# Patient Record
Sex: FEMALE | Race: BLACK OR AFRICAN AMERICAN | URBAN - METROPOLITAN AREA
[De-identification: names, ages, dates, MRNs, and addresses within clinical notes are randomized per-mention and may not be internally consistent; named-entity substitution may affect disease eponyms.]

---

## 2017-01-06 ENCOUNTER — OFFICE VISIT (OUTPATIENT)
Dept: FAMILY MEDICINE | Facility: CLINIC | Age: 22
End: 2017-01-06

## 2017-01-06 VITALS
OXYGEN SATURATION: 100 % | DIASTOLIC BLOOD PRESSURE: 66 MMHG | RESPIRATION RATE: 20 BRPM | WEIGHT: 93.7 LBS | TEMPERATURE: 98.3 F | BODY MASS INDEX: 19.14 KG/M2 | SYSTOLIC BLOOD PRESSURE: 106 MMHG | HEART RATE: 80 BPM

## 2017-01-06 DIAGNOSIS — J30.81 ALLERGIC RHINITIS DUE TO ANIMAL HAIR AND DANDER, UNSPECIFIED RHINITIS SEASONALITY: ICD-10-CM

## 2017-01-06 DIAGNOSIS — F43.21 ADJUSTMENT DISORDER WITH DEPRESSED MOOD: Primary | ICD-10-CM

## 2017-01-06 DIAGNOSIS — R64 CACHEXIA (H): ICD-10-CM

## 2017-01-06 DIAGNOSIS — J30.1 SEASONAL ALLERGIC RHINITIS DUE TO POLLEN: ICD-10-CM

## 2017-01-06 DIAGNOSIS — E55.9 VITAMIN D DEFICIENCY: ICD-10-CM

## 2017-01-06 DIAGNOSIS — K59.01 SLOW TRANSIT CONSTIPATION: ICD-10-CM

## 2017-01-06 DIAGNOSIS — Z00.00 HEALTHCARE MAINTENANCE: ICD-10-CM

## 2017-01-06 RX ORDER — ERGOCALCIFEROL 1.25 MG/1
CAPSULE, LIQUID FILLED ORAL
Qty: 12 CAPSULE | Refills: 0 | Status: SHIPPED | OUTPATIENT
Start: 2017-01-06 | End: 2017-01-20

## 2017-01-06 RX ORDER — FLUTICASONE PROPIONATE 50 MCG
1-2 SPRAY, SUSPENSION (ML) NASAL DAILY
Qty: 16 G | Refills: 3 | Status: SHIPPED | OUTPATIENT
Start: 2017-01-06 | End: 2017-09-06

## 2017-01-06 RX ORDER — MIRTAZAPINE 15 MG/1
15 TABLET, FILM COATED ORAL AT BEDTIME
Qty: 31 TABLET | Refills: 3 | Status: SHIPPED | OUTPATIENT
Start: 2017-01-06 | End: 2017-03-13

## 2017-01-06 RX ORDER — POLYETHYLENE GLYCOL 3350 17 G/17G
1 POWDER, FOR SOLUTION ORAL DAILY
Qty: 510 G | Refills: 1 | Status: SHIPPED | OUTPATIENT
Start: 2017-01-06 | End: 2017-09-06

## 2017-01-06 NOTE — MR AVS SNAPSHOT
After Visit Summary   1/6/2017    Rupinder Spaulding    MRN: [id number]           Patient Information     Date Of Birth          1995        Visit Information        Provider Department      1/6/2017 8:40 AM aRjesh Sanchez DO [facility]        Today's Diagnoses     Adjustment disorder with depressed mood    -  1     Seasonal allergic rhinitis         Vitamin D deficiency         Slow transit constipation         Allergic rhinitis due to animal hair and dander, unspecified rhinitis seasonality         Cachexia (H)           Care Instructions    Here is the plan from today's visit    1. Adjustment disorder with depressed mood  - mirtazapine (REMERON) 15 MG tablet; Take 1 tablet (15 mg) by mouth At Bedtime  Dispense: 31 tablet; Refill: 3    2. Vitamin D deficiency  - vitamin D (ERGOCALCIFEROL) 89512 UNIT capsule; 1 capsule weekly for 12 weeks return for clinic appointment for recheck at 10-12 weeks  Dispense: 12 capsule; Refill: 0    3. Slow transit constipation  - polyethylene glycol (MIRALAX) powder; Take 17 g (1 capful) by mouth daily  Dispense: 510 g; Refill: 1    4. Allergic rhinitis due to animal hair and dander, unspecified rhinitis seasonality  - fluticasone (FLONASE) 50 MCG/ACT spray; Spray 1-2 sprays into both nostrils daily  Dispense: 16 g; Refill: 3    5. Cachexia (H)  [place] supplement packets may be cheaper.  - order for DME; Ensure or equivalent supplement 2 cans daily  Dispense: 60 Can; Refill: 3      Follow up plan: in 3 months with Dr. Mixon      Thank you for coming to [facility] today.  Lab Testing:  **If you had lab testing today and your results are reassuring or normal they will be mailed to you or sent through [employer] within 7 days.   **If the lab tests need quick action we will call you with the results.  The phone number we will call with results is # [phone] (home) . If this is not the best number please call our clinic and change the  number.  Medication Refills:  If you need any refills please call your pharmacy and they will contact us.   If you need to  your refill at a new pharmacy, please contact the new pharmacy directly. The new pharmacy will help you get your medications transferred faster.   Scheduling:  If you have any concerns about today's visit or wish to schedule another appointment please call our office during normal business hours [phone] (8-5:00 M-F)  If a referral was made to a [facility] and you don't get a call from central scheduling please call [phone].  If a Mammogram was ordered for you at [facility] call [phone] to schedule or change your appointment.  If you had an XRay/CT/Ultrasound/MRI ordered the number is [phone] to schedule or change your radiology appointment.   Medical Concerns:  If you have urgent medical concerns please call [phone] at any time of the day.  If you have a medical emergency please call [phone].          Follow-ups after your visit        Your next 10 appointments already scheduled     Jan 20, 2017  9:40 AM   Return Visit with Reuben Mixon MD   [facility] ([facility])    [address],  [address]  [place] [address]   [phone]              Who to contact     Please call your clinic at [phone] to:    Ask questions about your health    Make or cancel appointments    Discuss your medicines    Learn about your test results    Speak to your doctor   If you have compliments or concerns about an experience at your clinic, or if you wish to file a complaint, please contact [facility] Patient Relations at [phone] or email us at [email or web address]         Additional Information About Your Visit        [employer] Information     [employer] is an electronic gateway that provides easy, online access to your medical records. With [employer]izer, you can request a clinic  appointment, read your test results, renew a prescription or communicate with your care team.     To sign up for [employer] visit the website at [email or web address]   You will be asked to enter the access code listed below, as well as some personal information. Please follow the directions to create your username and password.     Your access code is: [email or web address]  Expires: 2017  9:29 AM     Your access code will  in 90 days. If you need help or a new code, please contact your [facility] or call [phone] for assistance.        Care EveryWhere ID     This is your Care EveryWhere ID. This could be used by other organizations to access your [place] medical records  [id number]        Your Vitals Were     Pulse Temperature Respirations Pulse Oximetry          80 98.3  F (36.8  C) (Oral) 20 100%         Blood Pressure from Last 3 Encounters:   17 106/66   16 101/67   16 96/69    Weight from Last 3 Encounters:   17 93 lb 11.2 oz (42.502 kg)   16 89 lb 9.6 oz (40.642 kg)   16 85 lb 9.6 oz (38.828 kg)              Today, you had the following     No orders found for display         Today's Medication Changes          These changes are accurate as of: 17  9:37 AM.  If you have any questions, ask your nurse or doctor.               Start taking these medicines.        Dose/Directions    order for DME   Used for:  Cachexia (H)   Started by:  Rajesh Sanchez, DO        Ensure or equivalent supplement 2 cans daily   Quantity:  60 Can   Refills:  3            Where to get your medicines      These medications were sent to [facility] [id number] [address]  [address] [place] [address]     Phone:  [phone]    - fluticasone 50 MCG/ACT spray  - mirtazapine 15 MG tablet  - polyethylene glycol powder  - vitamin D 71107 UNIT capsule      Some of these will need a  paper prescription and others can be bought over the counter.  Ask your nurse if you have questions.     Bring a paper prescription for each of these medications    - order for DME             Primary Care Provider Office Phone # Fax #    Reuben Mixon [phone]       [facility] [address] [place] [phone]        Thank you!     Thank you for choosing [facility]  for your care. Our goal is always to provide you with excellent care. Hearing back from our patients is one way we can continue to improve our services. Please take a few minutes to complete the written survey that you may receive in the mail after your visit with us. Thank you!             Your Updated Medication List - Protect others around you: Learn how to safely use, store and throw away your medicines at www.disposemymeds.org.          This list is accurate as of: 1/6/17  9:37 AM.  Always use your most recent med list.                   Brand Name Dispense Instructions for use    fluticasone 50 MCG/ACT spray    FLONASE    16 g    Spray 1-2 sprays into both nostrils daily       mirtazapine 15 MG tablet    REMERON    31 tablet    Take 1 tablet (15 mg) by mouth At Bedtime       NUTRITIONAL SUPPLEMENT Liqd     30 each    Take 1 Can by mouth 3 times daily       order for DME     60 Can    Ensure or equivalent supplement 2 cans daily       polyethylene glycol powder    MIRALAX    510 g    Take 17 g (1 capful) by mouth daily       vitamin D 89949 UNIT capsule    ERGOCALCIFEROL    12 capsule    1 capsule weekly for 12 weeks return for clinic appointment for recheck at 10-12 weeks

## 2017-01-06 NOTE — PATIENT INSTRUCTIONS
Here is the plan from today's visit    1. Adjustment disorder with depressed mood  - mirtazapine (REMERON) 15 MG tablet; Take 1 tablet (15 mg) by mouth At Bedtime  Dispense: 31 tablet; Refill: 3    2. Vitamin D deficiency  - vitamin D (ERGOCALCIFEROL) 92049 UNIT capsule; 1 capsule weekly for 12 weeks return for clinic appointment for recheck at 10-12 weeks  Dispense: 12 capsule; Refill: 0    3. Slow transit constipation  - polyethylene glycol (MIRALAX) powder; Take 17 g (1 capful) by mouth daily  Dispense: 510 g; Refill: 1    4. Allergic rhinitis due to animal hair and dander, unspecified rhinitis seasonality  - fluticasone (FLONASE) 50 MCG/ACT spray; Spray 1-2 sprays into both nostrils daily  Dispense: 16 g; Refill: 3    5. Cachexia (H)  [place] supplement packets may be cheaper.  - order for DME; Ensure or equivalent supplement 2 cans daily  Dispense: 60 Can; Refill: 3      Follow up plan: in 3 months with Dr. Mixon      Thank you for coming to [facility] today.  Lab Testing:  **If you had lab testing today and your results are reassuring or normal they will be mailed to you or sent through [employer] within 7 days.   **If the lab tests need quick action we will call you with the results.  The phone number we will call with results is # [phone] (home) . If this is not the best number please call our clinic and change the number.  Medication Refills:  If you need any refills please call your pharmacy and they will contact us.   If you need to  your refill at a new pharmacy, please contact the new pharmacy directly. The new pharmacy will help you get your medications transferred faster.   Scheduling:  If you have any concerns about today's visit or wish to schedule another appointment please call our office during normal business hours [phone] (8-5:00 M-F)  If a referral was made to a [facility] and you don't get a call from central scheduling please call  [phone].  If a Mammogram was ordered for you at [facility] call [phone] to schedule or change your appointment.  If you had an XRay/CT/Ultrasound/MRI ordered the number is [phone] to schedule or change your radiology appointment.   Medical Concerns:  If you have urgent medical concerns please call [phone] at any time of the day.  If you have a medical emergency please call [phone].

## 2017-01-06 NOTE — PROGRESS NOTES
HPI:       Rupinder Spaulding is a 22 year old who presents for the following  Patient presents with:  URI: coughing headach, unable to eat and sleep  Recheck Medication: all medications      93 lbs 11.2 oz   Wt Readings from Last 4 Encounters:   01/06/17 93 lb 11.2 oz (42.502 kg)   11/04/16 89 lb 9.6 oz (40.642 kg)   09/30/16 85 lb 9.6 oz (38.828 kg)   08/19/16 83 lb 9.6 oz (37.921 kg)       Mom has been giving patient nutritional drink (ensure) daily to help her gain weight.  Says it seems to have worked.  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  She has behavioral issues and is alone until her mom comes home from work.  Would like to discuss with a  to see if anything can be done to help.      A Setred  was used for  this visit.      Problem, Medication and Allergy Lists were reviewed and are current.  Patient is an established patient of this clinic.         Review of Systems:   Review of Systems   Constitutional: Negative for fever.   HENT: Positive for congestion.    Neurological: Positive for headaches.   Psychiatric/Behavioral: Positive for behavioral problems.             Physical Exam:   Patient Vitals for the past 24 hrs:   BP Temp Temp src Pulse Resp SpO2 Weight   01/06/17 0902 106/66 mmHg 98.3  F (36.8  C) Oral 80 20 100 % 93 lb 11.2 oz (42.502 kg)     Body mass index is 19.14 kg/(m^2).  Vitals were reviewed and were normal     Physical Exam   Constitutional: She appears well-developed. No distress.   Thin-appearing   Cardiovascular: Normal rate.    Pulmonary/Chest: Effort normal.   Psychiatric:   She is slowed and withdrawn. She is noncommunicative.   Responds to commands and smiles on occasion, unable to assess cognition or judgment. She does smile in response to some questions.            Results:     No labs ordered.   Assessment and Plan     1. Cachexia (H)  Mother has been buying ensure for  patient which has helped her gain weight.  Patient also taking mirtazapine.  Patient has gained 10lbs over the past 5 months.  Recommend that patient continues ensure two cans daily.  Can try carnation supplement packets in the meantime due to cost of ensure.  - order for DME; Ensure or equivalent supplement 2 cans daily  Dispense: 60 Can; Refill: 3    2. Adjustment disorder with depressed mood  Patient seems to be doing ok.  Mom feels she is still depressed and hopes having more PCA hours will help.  Rocío came in to discuss with her.  - mirtazapine (REMERON) 15 MG tablet; Take 1 tablet (15 mg) by mouth At Bedtime  Dispense: 31 tablet; Refill: 3    3. Vitamin D deficiency  - vitamin D (ERGOCALCIFEROL) 77706 UNIT capsule; 1 capsule weekly for 12 weeks return for clinic appointment for recheck at 10-12 weeks  Dispense: 12 capsule; Refill: 0    4. Slow transit constipation  - polyethylene glycol (MIRALAX) powder; Take 17 g (1 capful) by mouth daily  Dispense: 510 g; Refill: 1    5. Allergic rhinitis due to animal hair and dander, unspecified rhinitis seasonality  - fluticasone (FLONASE) 50 MCG/ACT spray; Spray 1-2 sprays into both nostrils daily  Dispense: 16 g; Refill: 3      Follow up plan: in 3 months with Dr. Mixon    There are no discontinued medications.  Options for treatment and follow-up care were reviewed with the patient. Rupinder Spaulding  engaged in the decision making process and verbalized understanding of the options discussed and agreed with the final plan.    Rajesh Sanchez DO

## 2017-01-06 NOTE — Clinical Note
[facility]   [address],  [address]  [facility] [address]  [phone]        2017    Rupinder Spaulding  [address]  [facility] [address]  [phone] (home)     :     1995          To Whom it May Concern:    This patient missed school 2017 due to a clinic visit.    Please contact me for questions or concerns at [phone].    Sincerely,            Lottie Castrejon CMA

## 2017-01-06 NOTE — PROGRESS NOTES
Preceptor Attestation:   Patient seen and discussed with the resident. Assessment and plan reviewed with resident and agreed upon.   Supervising Physician:  Reuben Mixon MD  [facility]

## 2017-01-07 ASSESSMENT — ENCOUNTER SYMPTOMS
FEVER: 0
HEADACHES: 1

## 2017-01-12 ENCOUNTER — TELEPHONE (OUTPATIENT)
Dept: FAMILY MEDICINE | Facility: CLINIC | Age: 22
End: 2017-01-12

## 2017-01-12 NOTE — TELEPHONE ENCOUNTER
Message routed to PCP to advise calorie content and flavor. Please route to PCS follow up with order. RN unable to provide patient's insurance information without consent.    Iliana Singleton RN

## 2017-01-12 NOTE — TELEPHONE ENCOUNTER
[facility] phone call message- general phone call:    Reason for call: [employer] is calling. [employer] faxed over a form for Ensure liquid. They state they are missing some information. They need the flavor, the number of calories. They also need insurance information. It was sent to them on 1/9/17    Return call needed: Yes    OK to leave a message on voice mail? No    Primary language: Afghan      needed? Yes    Call taken on January 12, 2017 at 4:05 PM by Brittani Rowe

## 2017-01-13 NOTE — TELEPHONE ENCOUNTER
200-300 Kcal in each supplement three times daily, flavor vanilla or any other flavor patient desires, insurance info from the patient.   Please call [employer]

## 2017-01-13 NOTE — TELEPHONE ENCOUNTER
Unable to reach caller, left VM advising information from PCP. Advised they will need to contact patient to gather insurance information.    Iliana Singleton RN

## 2017-01-20 ENCOUNTER — OFFICE VISIT (OUTPATIENT)
Dept: FAMILY MEDICINE | Facility: CLINIC | Age: 22
End: 2017-01-20

## 2017-01-20 VITALS
TEMPERATURE: 98.3 F | BODY MASS INDEX: 18.92 KG/M2 | RESPIRATION RATE: 18 BRPM | SYSTOLIC BLOOD PRESSURE: 91 MMHG | HEART RATE: 68 BPM | OXYGEN SATURATION: 99 % | DIASTOLIC BLOOD PRESSURE: 60 MMHG | WEIGHT: 92.6 LBS

## 2017-01-20 DIAGNOSIS — G44.219 EPISODIC TENSION-TYPE HEADACHE, NOT INTRACTABLE: ICD-10-CM

## 2017-01-20 DIAGNOSIS — E55.9 VITAMIN D DEFICIENCY: Primary | ICD-10-CM

## 2017-01-20 DIAGNOSIS — E44.0 MODERATE MALNUTRITION (H): ICD-10-CM

## 2017-01-20 RX ORDER — ERGOCALCIFEROL 1.25 MG/1
CAPSULE, LIQUID FILLED ORAL
Qty: 12 CAPSULE | Refills: 0 | Status: SHIPPED | OUTPATIENT
Start: 2017-01-20 | End: 2017-09-06

## 2017-01-20 RX ORDER — ZINC OXIDE 216 MG/ML
1 LOTION TOPICAL 3 TIMES DAILY
Qty: 30 EACH | Refills: 11 | Status: SHIPPED | OUTPATIENT
Start: 2017-01-20 | End: 2017-09-06

## 2017-01-20 ASSESSMENT — ENCOUNTER SYMPTOMS
SHORTNESS OF BREATH: 0
SORE THROAT: 0
FEVER: 0
NECK STIFFNESS: 0
FACIAL ASYMMETRY: 0
FATIGUE: 0
EYE REDNESS: 0
DYSPHORIC MOOD: 0
TROUBLE SWALLOWING: 0
PHOTOPHOBIA: 0
COUGH: 0
RHINORRHEA: 0
WHEEZING: 0
ACTIVITY CHANGE: 0
CHEST TIGHTNESS: 0
ADENOPATHY: 0

## 2017-01-20 NOTE — PATIENT INSTRUCTIONS
Here is the plan from today's visit    1. Vitamin D deficiency  - vitamin D (ERGOCALCIFEROL) 07837 UNIT capsule; 1 capsule weekly for 12 weeks return for clinic appointment for recheck at 10-12 weeks  Dispense: 12 capsule; Refill: 0    2. Episodic tension-type headache, not intractable  Ear plugs/foam for ears to drown out noise.  Also use naproxen as needed.    - naproxen (NAPROSYN) 375 MG tablet; Take 1 tablet (375 mg) by mouth daily as needed for moderate pain  Dispense: 60 tablet; Refill: 1  - Misc. Devices (FOAM EAR PLUGS) MISC; 1 each daily as needed  Dispense: 6 each; Refill: 1    3. Moderate malnutrition (H)    - NUTRITIONAL SUPPLEMENT LIQD; Take 1 Can by mouth 3 times daily  Dispense: 30 each; Refill: 11          Please call or return to clinic if your symptoms don't go away.    Follow up plan  Please make a clinic appointment for follow up with your primary physician Reuben Mixon if symptoms worsen or do not improve.    Thank you for coming to [facility] today.  Lab Testing:  **If you had lab testing today and your results are reassuring or normal they will be mailed to you or sent through [employer] within 7 days.   **If the lab tests need quick action we will call you with the results.  The phone number we will call with results is # [phone] (home) . If this is not the best number please call our clinic and change the number.  Medication Refills:  If you need any refills please call your pharmacy and they will contact us.   If you need to  your refill at a new pharmacy, please contact the new pharmacy directly. The new pharmacy will help you get your medications transferred faster.   Scheduling:  If you have any concerns about today's visit or wish to schedule another appointment please call our office during normal business hours [phone] (8-5:00 M-F)  If a referral was made to a [facility] and you don't get a call from central scheduling please call  [phone].  If a Mammogram was ordered for you at [facility] call [phone] to schedule or change your appointment.  If you had an XRay/CT/Ultrasound/MRI ordered the number is [phone] to schedule or change your radiology appointment.   Medical Concerns:  If you have urgent medical concerns please call [phone] at any time of the day.  If you have a medical emergency please call [phone].

## 2017-01-20 NOTE — PROGRESS NOTES
HPI:       Rupinder Spaulding is a 22 year old who presents for the following  Patient presents with:  Cough  Refill Request      Acute Illness   Concerns: cough   When did it start? 15 days   Is it getting better, worse or staying the same? better    Fever?: No     Chills/Sweats?: No     Headache (location? Yes     Sinus Pressure?:No     Conjunctivitis?: No     Ear Pain?: No     Runny nose?: yes taking nasal spray     Congestion?:  YES taking nasal spray    Sore Throat?: No      Cough?:  YES-non-productive    Wheeze?: No     Decreased Appetite?: No     Nausea?:No     Vomiting?: No     Diarrhea?:  No     Dysuria/Frequency?.:No     Fatigue/Achiness?: YES after school patient is tired     Sick/Strep Exposure?: No      Therapies Tried and outcome: mediations/ nasal spray     Headaches almost everyday she comes back from school has headaches -she goes to her room      Patient presents with cough and headaches.  Stated that she has had a cough for about 2 weeks.  Stated that the cough has gotten better, but has not gone away.  Stated that she is complaining of a headache mostly all the time, worse after school.  Also having a lot of fatigue. Just wants to rest after school.  Discussed that headache is not concerning.  Stress headache most likely.      Headache     Onset: several weeks     Description:   Location: bilateral in the frontal area   Character: throbbing pain  Frequency:  Almost daily  Duration:  An hour after school    Intensity: mild    Progression of Symptoms:  intermittent    Accompanying Signs & Symptoms:  Stiff neck: no  Neck or upper back pain: no  Fever: no  Sinus pressure: no  Nausea or vomitingno  Dizziness: { no yes  :no  Numbness::no  Weakness (in one part of the body?  :no  Visual changes: no   History:   Head trauma: no  Family history of migraines: no  Previous tests for headaches: no  Neurologist evaluations: no  Able to do daily activities: Yes  Wake with a headaches: no  Do headaches wake you  up:no  Daily pain medication use:  no  New life stressors  :unclear    Precipitating factors:   Does light make it worse: no  Does sound make it worse: no    Alleviating factors:  Does sleep help: YES         Therapies Tried and outcome: none    A Russian  was used for  this visit.      Problem, Medication and Allergy Lists were reviewed and are current.  Patient is an established patient of this clinic.         Review of Systems:   Review of Systems   Constitutional: Negative for fever, activity change and fatigue.   HENT: Negative for ear pain, rhinorrhea, sneezing, sore throat and trouble swallowing.    Eyes: Negative for photophobia and redness.   Respiratory: Negative for cough, chest tightness, shortness of breath and wheezing.    Musculoskeletal: Negative for neck stiffness.   Allergic/Immunologic: Negative for environmental allergies.   Neurological: Negative for facial asymmetry.        See HPI   Hematological: Negative for adenopathy.   Psychiatric/Behavioral: Negative for dysphoric mood.             Physical Exam:     Patient Vitals for the past 24 hrs:   BP Temp Temp src Pulse Resp SpO2 Weight   01/20/17 0952 91/60 mmHg 98.3  F (36.8  C) Oral 68 18 99 % 92 lb 9.6 oz (42.003 kg)     Body mass index is 18.92 kg/(m^2).  Vitals were reviewed and were normal    Wt Readings from Last 4 Encounters:   01/20/17 92 lb 9.6 oz (42.003 kg)   01/06/17 93 lb 11.2 oz (42.502 kg)   11/04/16 89 lb 9.6 oz (40.642 kg)   09/30/16 85 lb 9.6 oz (38.828 kg)        Physical Exam   Constitutional: She is oriented to person, place, and time. She appears well-developed and well-nourished. No distress.   HENT:   Head: Normocephalic and atraumatic.       Right Ear: Hearing, tympanic membrane, external ear and ear canal normal.   Left Ear: Hearing, tympanic membrane, external ear and ear canal normal.   Nose: Nose normal.   Mouth/Throat: Uvula is midline, oropharynx is clear and moist and mucous membranes are normal.    Headache in forehead, stress headache    Eyes: Conjunctivae and EOM are normal. Pupils are equal, round, and reactive to light.   Neck: Normal range of motion. Neck supple. No thyromegaly present.   Cardiovascular: Normal rate, regular rhythm, normal heart sounds and intact distal pulses.    Pulmonary/Chest: Effort normal and breath sounds normal.   Musculoskeletal: Normal range of motion.   Neurological: She is alert and oriented to person, place, and time. She has normal strength and normal reflexes. She displays no atrophy and no tremor. No cranial nerve deficit or sensory deficit. She exhibits normal muscle tone. She displays a negative Romberg sign. She displays no seizure activity. Coordination and gait normal. GCS eye subscore is 4. GCS verbal subscore is 5. GCS motor subscore is 6.   Reflex Scores:       Patellar reflexes are 2+ on the right side and 2+ on the left side.       Achilles reflexes are 2+ on the right side and 2+ on the left side.  Psychiatric: She has a normal mood and affect. Her speech is normal and behavior is normal. Judgment and thought content normal.   Vitals reviewed.        Results:     none  Assessment and Plan     Patient Instructions   Here is the plan from today's visit    1. Vitamin D deficiency  - vitamin D (ERGOCALCIFEROL) 10652 UNIT capsule; 1 capsule weekly for 12 weeks return for clinic appointment for recheck at 10-12 weeks  Dispense: 12 capsule; Refill: 0    2. Episodic tension-type headache, not intractable  Ear plugs/foam for ears to protect from noise.  Also use naproxen as needed.    - naproxen (NAPROSYN) 375 MG tablet; Take 1 tablet (375 mg) by mouth daily as needed for moderate pain  Dispense: 60 tablet; Refill: 1  - Misc. Devices (FOAM EAR PLUGS) MISC; 1 each daily as needed  Dispense: 6 each; Refill: 1    3. Moderate malnutrition (H)    - NUTRITIONAL SUPPLEMENT LIQD; Take 1 Can by mouth 3 times daily  Dispense: 30 each; Refill: 11      Please call or return to  clinic if your symptoms don't go away.    Follow up plan  Please make a clinic appointment for follow up with your primary physician Reuben Mixon if symptoms worsen or do not improve.      There are no discontinued medications.  Options for treatment and follow-up care were reviewed with the patient. Rupinder Spaulding  engaged in the decision making process and verbalized understanding of the options discussed and agreed with the final plan.    Reuben Mixon MD

## 2017-01-20 NOTE — PROGRESS NOTES
"      HPI:       Rupinder Spaulding is a 22 year old who presents for the following  No chief complaint on file.      Acute Illness   Concerns: ***  When did it start? ***  Is it getting better, worse or staying the same? {SAME/BETTER/WORSE:983487}    Fever?: { :222786::\"No \"}    Chills/Sweats?: { :549348::\"No \"}    Headache (location?){ :143329::\"No \"}    Sinus Pressure?:{ :932499::\"No \"}    Conjunctivitis?: { :805074::\"No \"}    Ear Pain?: { :231567::\"No \"}    Runny nose?: { :422129::\"No \"}    Congestion?: { :397725::\"No \"}    Sore Throat?: { :972696::\"No \"}     Cough?: {No Yes Cough Red:236576::\"no \"}    Wheeze?: { :804771::\"No \"}    Decreased Appetite?: { :782208::\"No \"}    Nausea?:{ :837163::\"No \"}    Vomiting?: { :763048::\"No \"}    Diarrhea?:  { :823468::\"No \"}    Dysuria/Frequency?.:{ :158841::\"No \"}    Fatigue/Achiness?:{ :468293::\"No \"}    Sick/Strep Exposure?: { :976956::\"No \"}     Therapies Tried and outcome: { :14810::\"Nothing\"}  ,       Adherence and Exercise  Medication side effects: {YES/NO/NOT ASKED/NOT IMTIAZ/Med side effects:160322}  How often is a medication missed? {FREQUENCY PER WEEK:759783}  Are you able to follow the treatment plan? {YES / NO:938079::\"Yes\"}  Exercise:{[facility]  Exercise Type:817488} {Exercise frequency days per week:103934}         A flo.do  was used for  this visit.          Adherence and Exercise  Medication side effects: {YES/NO/NOT ASKED/NOT IMTIAZ/Med side effects:316183}  How often is a medication missed? {FREQUENCY PER WEEK:768411}  Are you able to follow the treatment plan? {YES / NO:056211::\"Yes\"}  Exercise:{[facility] Exercise Type:059203} {Exercise frequency days per week:923475}       Problem, Medication and Allergy Lists were reviewed and are current.  Patient is an established patient of this clinic.         Review of Systems:   Review of Systems          Physical Exam:   No data found.    There is no weight on file to calculate BMI.  {[facility] VITALS OPTIONS:957151::\"Vitals " "were reviewed and were normal\"}     Physical Exam  {  Detailed Ortho and mental status exam:955619}    Results:     {Result Choices:192682}  Assessment and Plan     {Assessment/Plan Pick List :042310}  There are no discontinued medications.  Options for treatment and follow-up care were reviewed with the patient. Rupinder Spaulding  engaged in the decision making process and verbalized understanding of the options discussed and agreed with the final plan.    Reuben Mixon MD      "

## 2017-01-20 NOTE — MR AVS SNAPSHOT
After Visit Summary   1/20/2017    Rupinder Spaulding    MRN: [id number]           Patient Information     Date Of Birth          1995        Visit Information        Provider Department      1/20/2017 9:40 AM Reuben Mixon MD [facility]        Today's Diagnoses     Vitamin D deficiency    -  1     Episodic tension-type headache, not intractable         Moderate malnutrition (H)           Care Instructions    Here is the plan from today's visit    1. Vitamin D deficiency  - vitamin D (ERGOCALCIFEROL) 33686 UNIT capsule; 1 capsule weekly for 12 weeks return for clinic appointment for recheck at 10-12 weeks  Dispense: 12 capsule; Refill: 0    2. Episodic tension-type headache, not intractable  Ear plugs/foam for ears to drown out noise.  Also use naproxen as needed.    - naproxen (NAPROSYN) 375 MG tablet; Take 1 tablet (375 mg) by mouth daily as needed for moderate pain  Dispense: 60 tablet; Refill: 1  - Misc. Devices (FOAM EAR PLUGS) MISC; 1 each daily as needed  Dispense: 6 each; Refill: 1    3. Moderate malnutrition (H)    - NUTRITIONAL SUPPLEMENT LIQD; Take 1 Can by mouth 3 times daily  Dispense: 30 each; Refill: 11          Please call or return to clinic if your symptoms don't go away.    Follow up plan  Please make a clinic appointment for follow up with your primary physician Reuben Mixon if symptoms worsen or do not improve.    Thank you for coming to [facility] today.  Lab Testing:  **If you had lab testing today and your results are reassuring or normal they will be mailed to you or sent through [place] within 7 days.   **If the lab tests need quick action we will call you with the results.  The phone number we will call with results is # [phone] (home) . If this is not the best number please call our clinic and change the number.  Medication Refills:  If you need any refills please call your pharmacy and they will contact us.   If you need to  your refill  at a new pharmacy, please contact the new pharmacy directly. The new pharmacy will help you get your medications transferred faster.   Scheduling:  If you have any concerns about today's visit or wish to schedule another appointment please call our office during normal business hours [phone] (8-5:00 M-F)  If a referral was made to a [facility] and you don't get a call from central scheduling please call [phone].  If a Mammogram was ordered for you at [facility] call [phone] to schedule or change your appointment.  If you had an XRay/CT/Ultrasound/MRI ordered the number is [phone] to schedule or change your radiology appointment.   Medical Concerns:  If you have urgent medical concerns please call [phone] at any time of the day.  If you have a medical emergency please call 458.        Follow-ups after your visit        Who to contact     Please call your clinic at [phone] to:    Ask questions about your health    Make or cancel appointments    Discuss your medicines    Learn about your test results    Speak to your doctor   If you have compliments or concerns about an experience at your clinic, or if you wish to file a complaint, please contact [facility] Patient Relations at [phone] or email us at [email or web address]         Additional Information About Your Visit        MyChart Information     "[employer] is an electronic gateway that provides easy, online access to your medical records. With "[employer] you can request a clinic appointment, read your test results, renew a prescription or communicate with your care team.     To sign up for [employer] visit the website at [email or web address]   You will be asked to enter the access code listed below, as well as some personal information. Please follow the directions to create your username and password.     Your access code is: [email or web address]  Expires: 4/6/2017  9:29  AM     Your access code will  in 90 days. If you need help or a new code, please contact your [facility] or call [phone] for assistance.        Care EveryWhere ID     This is your Care EveryWhere ID. This could be used by other organizations to access your [facility] medical records  [id number]        Your Vitals Were     Pulse Temperature Respirations Pulse Oximetry          68 98.3  F (36.8  C) (Oral) 18 99%         Blood Pressure from Last 3 Encounters:   17 91/60   17 106/66   16 101/67    Weight from Last 3 Encounters:   17 92 lb 9.6 oz (42.003 kg)   17 93 lb 11.2 oz (42.502 kg)   16 89 lb 9.6 oz (40.642 kg)              Today, you had the following     No orders found for display         Today's Medication Changes          These changes are accurate as of: 17 10:36 AM.  If you have any questions, ask your nurse or doctor.               Start taking these medicines.        Dose/Directions    Foam Ear Plugs Misc   Used for:  Episodic tension-type headache, not intractable   Started by:  Reuben Mixon MD        Dose:  1 each   1 each daily as needed   Quantity:  6 each   Refills:  1       naproxen 375 MG tablet   Commonly known as:  NAPROSYN   Used for:  Episodic tension-type headache, not intractable   Started by:  Reuben Mixon MD        Dose:  375 mg   Take 1 tablet (375 mg) by mouth daily as needed for moderate pain   Quantity:  60 tablet   Refills:  1            Where to get your medicines      These medications were sent to [facility] [id number] [address] AT SEC OF [facility] [employer]  [address] [place] [address]     Phone:  [phone]    - Foam Ear Plugs Misc  - naproxen 375 MG tablet  - NUTRITIONAL SUPPLEMENT Liqd  - vitamin D 31504 UNIT capsule             Primary Care Provider Office Phone # Fax #    Reuben Mixon [phone]       [facility]   [address] [address]        Thank you!     Thank you for choosing [facility]  for your care. Our goal is always to provide you with excellent care. Hearing back from our patients is one way we can continue to improve our services. Please take a few minutes to complete the written survey that you may receive in the mail after your visit with us. Thank you!             Your Updated Medication List - Protect others around you: Learn how to safely use, store and throw away your medicines at www.disposemymeds.org.          This list is accurate as of: 1/20/17 10:36 AM.  Always use your most recent med list.                   Brand Name Dispense Instructions for use    fluticasone 50 MCG/ACT spray    FLONASE    16 g    Spray 1-2 sprays into both nostrils daily       Foam Ear Plugs Misc     6 each    1 each daily as needed       mirtazapine 15 MG tablet    REMERON    31 tablet    Take 1 tablet (15 mg) by mouth At Bedtime       naproxen 375 MG tablet    NAPROSYN    60 tablet    Take 1 tablet (375 mg) by mouth daily as needed for moderate pain       NUTRITIONAL SUPPLEMENT Liqd     30 each    Take 1 Can by mouth 3 times daily       order for DME     60 Can    Ensure or equivalent supplement 2 cans daily       polyethylene glycol powder    MIRALAX    510 g    Take 17 g (1 capful) by mouth daily       vitamin D 70160 UNIT capsule    ERGOCALCIFEROL    12 capsule    1 capsule weekly for 12 weeks return for clinic appointment for recheck at 10-12 weeks

## 2017-02-08 ENCOUNTER — DOCUMENTATION ONLY (OUTPATIENT)
Dept: FAMILY MEDICINE | Facility: CLINIC | Age: 22
End: 2017-02-08

## 2017-02-08 NOTE — PROGRESS NOTES
"When opening a documentation only encounter, be sure to enter in \"Chief Complaint\" Forms and in \" Comments\" Title of form, description if needed.    Form received via: Fax  Form now resides in: Provider Ready    Form sent out via: Fax  Patient informed: No  Output date: 2/6/17    Form received by recipient via fax confirmation  Please close the encounter.    "

## 2017-02-15 ENCOUNTER — OFFICE VISIT (OUTPATIENT)
Dept: FAMILY MEDICINE | Facility: CLINIC | Age: 22
End: 2017-02-15

## 2017-02-15 VITALS
DIASTOLIC BLOOD PRESSURE: 76 MMHG | WEIGHT: 94 LBS | SYSTOLIC BLOOD PRESSURE: 107 MMHG | OXYGEN SATURATION: 100 % | TEMPERATURE: 97.8 F | BODY MASS INDEX: 19.21 KG/M2 | HEART RATE: 84 BPM | RESPIRATION RATE: 18 BRPM

## 2017-02-15 DIAGNOSIS — J06.9 VIRAL URI: ICD-10-CM

## 2017-02-15 DIAGNOSIS — R51.9 SINUS HEADACHE: ICD-10-CM

## 2017-02-15 DIAGNOSIS — R07.0 THROAT PAIN: Primary | ICD-10-CM

## 2017-02-15 LAB — S PYO AG THROAT QL IA.RAPID: NEGATIVE

## 2017-02-15 RX ORDER — PSEUDOEPHEDRINE HCL 120 MG/1
120 TABLET, FILM COATED, EXTENDED RELEASE ORAL EVERY 12 HOURS
Qty: 28 TABLET | Refills: 0 | Status: SHIPPED | OUTPATIENT
Start: 2017-02-15 | End: 2017-03-13

## 2017-02-15 RX ORDER — NAPROXEN 500 MG/1
500 TABLET ORAL DAILY PRN
Qty: 60 TABLET | Refills: 1 | Status: SHIPPED | OUTPATIENT
Start: 2017-02-15 | End: 2017-03-13

## 2017-02-15 ASSESSMENT — ENCOUNTER SYMPTOMS
HEADACHES: 0
CHEST TIGHTNESS: 0
SHORTNESS OF BREATH: 0
FATIGUE: 1
MYALGIAS: 1
SINUS PRESSURE: 0
ACTIVITY CHANGE: 0
ADENOPATHY: 0
WHEEZING: 0
TROUBLE SWALLOWING: 0
WEAKNESS: 0
NECK STIFFNESS: 0
EYE REDNESS: 0
DYSPHORIC MOOD: 0
FEVER: 0
PHOTOPHOBIA: 0
EYE DISCHARGE: 0
COUGH: 0

## 2017-02-15 NOTE — LETTER
RETURN TO WORK/SCHOOL FORM    2/15/2017    Re: Rupinder Spaulding  1995      To Whom It May Concern:     Rupinder Spaulding was seen in clinic today..  She may return to school without restrictions on 2/16/17                Reuben Mixon MD

## 2017-02-15 NOTE — PROGRESS NOTES
HPI:       Rupinder Spaulding is a 22 year old who presents for the following  Patient presents with:  Throat Pain    Acute Illness   Concerns: sore throat, mylagias  When did it start? 3 days, hasn't been to school since 3 days ago, was sent home from   Is it getting better, worse or staying the same? unchanged    Fever?:  YES tactile    Chills/Sweats?: No     Headache (location?) YES temples    Sinus Pressure?: YES frontal    Conjunctivitis?: No     Ear Pain?: No     Runny nose?: No     Congestion?: No     Sore Throat?:  YES on the Right side     Cough?: no     Wheeze?: No     Decreased Appetite?:  YES eating less    Nausea?:No     Vomiting?: No     Diarrhea?:  No     Dysuria/Frequency?.:No     Fatigue/Achiness?: YES all over     Sick/Strep Exposure?:  YES at school     Therapies Tried and outcome: Nothing    A Miner  was used for  this visit.    Wt Readings from Last 4 Encounters:   02/15/17 94 lb (42.6 kg)   01/20/17 92 lb 9.6 oz (42 kg)   01/06/17 93 lb 11.2 oz (42.5 kg)   11/04/16 89 lb 9.6 oz (40.6 kg)     Problem, Medication and Allergy Lists were reviewed and are current.  Patient is an established patient of this clinic.         Review of Systems:   Review of Systems   Constitutional: Positive for fatigue. Negative for activity change and fever.   HENT: Negative for ear pain, sinus pressure and trouble swallowing.         See HPI   Eyes: Negative for photophobia, discharge and redness.   Respiratory: Negative for cough, chest tightness, shortness of breath and wheezing.    Musculoskeletal: Positive for myalgias. Negative for neck stiffness.   Allergic/Immunologic: Negative for environmental allergies.   Neurological: Negative for weakness and headaches.   Hematological: Negative for adenopathy.   Psychiatric/Behavioral: Negative for dysphoric mood.             Physical Exam:   Patient Vitals for the past 24 hrs:   BP Temp Temp src Pulse Resp SpO2 Weight   02/15/17 0809 107/76 97.8  F (36.6  C)  Oral 84 18 100 % 94 lb (42.6 kg)     Body mass index is 19.21 kg/(m^2).  Vitals were reviewed and were normal     Physical Exam   Constitutional: She is oriented to person, place, and time. Vital signs are normal. She appears well-developed and well-nourished. She is active. She does not appear ill. No distress.   HENT:   Head: Normocephalic and atraumatic.       Right Ear: Hearing, tympanic membrane, external ear and ear canal normal.   Left Ear: Hearing, tympanic membrane, external ear and ear canal normal.   Nose: Rhinorrhea present. No sinus tenderness. Right sinus exhibits frontal sinus tenderness. Right sinus exhibits no maxillary sinus tenderness. Left sinus exhibits frontal sinus tenderness. Left sinus exhibits no maxillary sinus tenderness.   Mouth/Throat: Uvula is midline, oropharynx is clear and moist and mucous membranes are normal.   Eyes: Conjunctivae and EOM are normal. Pupils are equal, round, and reactive to light.   Neck: Normal range of motion. Neck supple. No thyromegaly present.   Cardiovascular: Normal rate, regular rhythm, normal heart sounds and intact distal pulses.    Pulmonary/Chest: Effort normal and breath sounds normal. No stridor. No respiratory distress.   Abdominal: Soft.   Musculoskeletal: Normal range of motion.   Neurological: She is alert and oriented to person, place, and time. She has normal strength and normal reflexes. She displays no atrophy and no tremor. No cranial nerve deficit or sensory deficit. She exhibits normal muscle tone. She displays a negative Romberg sign. She displays no seizure activity. Coordination and gait normal. GCS eye subscore is 4. GCS verbal subscore is 5. GCS motor subscore is 6.   Reflex Scores:       Patellar reflexes are 2+ on the right side and 2+ on the left side.       Achilles reflexes are 2+ on the right side and 2+ on the left side.  Skin: She is not diaphoretic.   Psychiatric: She has a normal mood and affect. Her speech is normal and  behavior is normal. Judgment and thought content normal.   Vitals reviewed.        Results:      Results from the last 24 hours  Results for orders placed or performed in visit on 02/15/17 (from the past 24 hour(s))   Rapid Strep Screen (Group) ([facility])   Result Value Ref Range    Rapid Strep A Screen NEGATIVE Negative     Assessment and Plan     Patient Instructions   Here is the plan from today's visit    1. Throat pain  Rapid strep negative  - Rapid Strep Screen (Group) ([facility])    2. Viral URI  Take the following medication     3. Sinus headache  Take the following medication   - pseudoePHEDrine (SUDAFED) 120 MG 12 hr tablet; Take 1 tablet (120 mg) by mouth every 12 hours  Dispense: 28 tablet; Refill: 0  - naproxen (NAPROSYN) 500 MG tablet; Take 1 tablet (500 mg) by mouth daily as needed for moderate pain  Dispense: 60 tablet; Refill: 1         Please call or return to clinic if your symptoms don't go away.    Follow up plan  Please make a clinic appointment for follow up with me (REUBEN MIXON) in 1-2 months   for a weight recheck.      There are no discontinued medications.  Options for treatment and follow-up care were reviewed with the patient. Rupinder Spaulding  engaged in the decision making process and verbalized understanding of the options discussed and agreed with the final plan.    Reuben Mixon MD

## 2017-02-15 NOTE — MR AVS SNAPSHOT
After Visit Summary   2/15/2017    Rupinder Spaulding    MRN: [id number]           Patient Information     Date Of Birth          1995        Visit Information        Provider Department      2/15/2017 8:00 AM Reuben Mixon MD [facility]        Today's Diagnoses     Throat pain    -  1    Viral URI        Sinus headache        Episodic tension-type headache, not intractable          Care Instructions    Here is the plan from today's visit    1. Throat pain    - Rapid Strep Screen (Group) (LabDAQ)    2. Viral URI  Take the follow ing medication     3. Sinus headache  Take the following medication   - pseudoePHEDrine (SUDAFED) 120 MG 12 hr tablet; Take 1 tablet (120 mg) by mouth every 12 hours  Dispense: 28 tablet; Refill: 0  - naproxen (NAPROSYN) 500 MG tablet; Take 1 tablet (500 mg) by mouth daily as needed for moderate pain  Dispense: 60 tablet; Refill: 1         Please call or return to clinic if your symptoms don't go away.    Follow up plan  Please make a clinic appointment for follow up with me (REUBEN MIXON) in 1-2 months   for a weight recheck.    Thank you for coming to [facility] today.  Lab Testing:  **If you had lab testing today and your results are reassuring or normal they will be mailed to you or sent through myhub within 7 days.   **If the lab tests need quick action we will call you with the results.  The phone number we will call with results is # [phone] (home) . If this is not the best number please call our clinic and change the number.  Medication Refills:  If you need any refills please call your pharmacy and they will contact us.   If you need to  your refill at a new pharmacy, please contact the new pharmacy directly. The new pharmacy will help you get your medications transferred faster.   Scheduling:  If you have any concerns about today's visit or wish to schedule another appointment please call our office during normal business hours  [phone] (8-5:00 M-F)  If a referral was made to a [facility] and you don't get a call from central scheduling please call [phone].  If a Mammogram was ordered for you at [facility] call [phone] to schedule or change your appointment.  If you had an XRay/CT/Ultrasound/MRI ordered the number is [phone] to schedule or change your radiology appointment.   Medical Concerns:  If you have urgent medical concerns please call [phone] at any time of the day.          Follow-ups after your visit        Who to contact     Please call your clinic at [phone] to:    Ask questions about your health    Make or cancel appointments    Discuss your medicines    Learn about your test results    Speak to your doctor   If you have compliments or concerns about an experience at your clinic, or if you wish to file a complaint, please contact [facility] Patient Relations at [phone] or email us at [email or web address]         Additional Information About Your Visit        Realty Compass Information     Realty Compass is an electronic gateway that provides easy, online access to your medical records. With Realty Compass, you can request a clinic appointment, read your test results, renew a prescription or communicate with your care team.     To sign up for Realty Compass visit the website at [email or web address]   You will be asked to enter the access code listed below, as well as some personal information. Please follow the directions to create your username and password.     Your access code is: [email or web address]  Expires: 2017  9:29 AM     Your access code will  in 90 days. If you need help or a new code, please contact your [facility] or call [phone] for assistance.        Care EveryWhere ID     This is your Care EveryWhere ID. This could be used by other organizations to access your [facility]  records  [id number]        Your Vitals Were     Pulse Temperature Respirations Pulse Oximetry Breastfeeding? BMI (Body Mass Index)    84 97.8  F (36.6  C) (Oral) 18 100% No 19.21 kg/m2       Blood Pressure from Last 3 Encounters:   02/15/17 107/76   01/20/17 91/60   01/06/17 106/66    Weight from Last 3 Encounters:   02/15/17 94 lb (42.6 kg)   01/20/17 92 lb 9.6 oz (42 kg)   01/06/17 93 lb 11.2 oz (42.5 kg)              We Performed the Following     Rapid Strep Screen (Group) (LabDAQ)          Today's Medication Changes          These changes are accurate as of: 2/15/17  8:33 AM.  If you have any questions, ask your nurse or doctor.               Start taking these medicines.        Dose/Directions    pseudoePHEDrine 120 MG 12 hr tablet   Commonly known as:  SUDAFED   Used for:  Sinus headache   Started by:  Reuben Mixon MD        Dose:  120 mg   Take 1 tablet (120 mg) by mouth every 12 hours   Quantity:  28 tablet   Refills:  0         These medicines have changed or have updated prescriptions.        Dose/Directions    naproxen 500 MG tablet   Commonly known as:  NAPROSYN   This may have changed:    - medication strength  - how much to take   Used for:  Sinus headache   Changed by:  Reuben Mixon MD        Dose:  500 mg   Take 1 tablet (500 mg) by mouth daily as needed for moderate pain   Quantity:  60 tablet   Refills:  1            Where to get your medicines      These medications were sent to [facility] [id number] [address] AT [facility] OF [address] [address]     Phone:  [phone]     naproxen 500 MG tablet         Some of these will need a paper prescription and others can be bought over the counter.  Ask your nurse if you have questions.     Bring a paper prescription for each of these medications     pseudoePHEDrine 120 MG 12 hr tablet                Primary Care Provider Office Phone # Fax #    Reuben Mixon [phone]  [phone]       [facility] [address]  [facility] [address]        Thank you!     Thank you for choosing [facility]  for your care. Our goal is always to provide you with excellent care. Hearing back from our patients is one way we can continue to improve our services. Please take a few minutes to complete the written survey that you may receive in the mail after your visit with us. Thank you!             Your Updated Medication List - Protect others around you: Learn how to safely use, store and throw away your medicines at www.disposemymeds.org.          This list is accurate as of: 2/15/17  8:33 AM.  Always use your most recent med list.                   Brand Name Dispense Instructions for use    fluticasone 50 MCG/ACT spray    FLONASE    16 g    Spray 1-2 sprays into both nostrils daily       Foam Ear Plugs Misc     6 each    1 each daily as needed       mirtazapine 15 MG tablet    REMERON    31 tablet    Take 1 tablet (15 mg) by mouth At Bedtime       naproxen 500 MG tablet    NAPROSYN    60 tablet    Take 1 tablet (500 mg) by mouth daily as needed for moderate pain       NUTRITIONAL SUPPLEMENT Liqd     30 each    Take 1 Can by mouth 3 times daily       order for DME     60 Can    Ensure or equivalent supplement 2 cans daily       polyethylene glycol powder    MIRALAX    510 g    Take 17 g (1 capful) by mouth daily       pseudoePHEDrine 120 MG 12 hr tablet    SUDAFED    28 tablet    Take 1 tablet (120 mg) by mouth every 12 hours       vitamin D 79030 UNIT capsule    ERGOCALCIFEROL    12 capsule    1 capsule weekly for 12 weeks return for clinic appointment for recheck at 10-12 weeks

## 2017-02-15 NOTE — PATIENT INSTRUCTIONS
Here is the plan from today's visit    1. Throat pain    - Rapid Strep Screen (Group) (LabDAQ)    2. Viral URI  Take the follow ing medication     3. Sinus headache  Take the following medication   - pseudoePHEDrine (SUDAFED) 120 MG 12 hr tablet; Take 1 tablet (120 mg) by mouth every 12 hours  Dispense: 28 tablet; Refill: 0  - naproxen (NAPROSYN) 500 MG tablet; Take 1 tablet (500 mg) by mouth daily as needed for moderate pain  Dispense: 60 tablet; Refill: 1         Please call or return to clinic if your symptoms don't go away.    Follow up plan  Please make a clinic appointment for follow up with me (JOSE STAHL) in 1-2 months   for a weight recheck.    Thank you for coming to [facility] today.  Lab Testing:  **If you had lab testing today and your results are reassuring or normal they will be mailed to you or sent through Acquaintable within 7 days.   **If the lab tests need quick action we will call you with the results.  The phone number we will call with results is # [phone] (home) . If this is not the best number please call our clinic and change the number.  Medication Refills:  If you need any refills please call your pharmacy and they will contact us.   If you need to  your refill at a new pharmacy, please contact the new pharmacy directly. The new pharmacy will help you get your medications transferred faster.   Scheduling:  If you have any concerns about today's visit or wish to schedule another appointment please call our office during normal business hours [phone] (8-5:00 M-F)  If a referral was made to a [facility] and you don't get a call from central scheduling please call [phone].  If a Mammogram was ordered for you at [facility] call [phone] to schedule or change your appointment.  If you had an XRay/CT/Ultrasound/MRI ordered the number is [phone] to schedule or change your radiology appointment.   Medical Concerns:  If you have  urgent medical concerns please call [phone] at any time of the day.

## 2017-02-16 ENCOUNTER — TELEPHONE (OUTPATIENT)
Dept: FAMILY MEDICINE | Facility: CLINIC | Age: 22
End: 2017-02-16

## 2017-02-16 NOTE — TELEPHONE ENCOUNTER
At today's IT Dr. Mixon requested follow-up with this patient for psychiatry. Tracee - will you please call the family and refer them to [facility] for psychiatry?      [facility] (adults)  [address] [place], [place]   [phone]  (other locations available, can call main #)        Thanks!

## 2017-02-17 ENCOUNTER — DOCUMENTATION ONLY (OUTPATIENT)
Dept: FAMILY MEDICINE | Facility: CLINIC | Age: 22
End: 2017-02-17

## 2017-02-17 LAB
BACTERIA SPEC CULT: NORMAL
MICRO REPORT STATUS: NORMAL
SPECIMEN SOURCE: NORMAL

## 2017-02-17 NOTE — PROGRESS NOTES
"When opening a documentation only encounter, be sure to enter in \"Chief Complaint\" Forms and in \" Comments\" Title of form, description if needed.    Form received via: Fax  Form now resides in: Provider Ready    Form sent out via: Fax  Patient informed: No  Output date: February 17, 2017      Form received by recipient via fax confirmation  Please close the encounter.    "

## 2017-02-27 ENCOUNTER — TELEPHONE (OUTPATIENT)
Dept: FAMILY MEDICINE | Facility: CLINIC | Age: 22
End: 2017-02-27

## 2017-02-27 NOTE — TELEPHONE ENCOUNTER
[facility] phone call message- general phone call:    Reason for call: Vangie calling from [facility] requesting call back to obtain ICD 10 Code related to patients weight loss.    Return call needed: Yes    OK to leave a message on voice mail? Yes    Primary language: Guyanese      needed? No    Call taken on February 27, 2017 at 1:07 PM by Kristen Jamison

## 2017-03-13 ENCOUNTER — OFFICE VISIT (OUTPATIENT)
Dept: FAMILY MEDICINE | Facility: CLINIC | Age: 22
End: 2017-03-13

## 2017-03-13 VITALS
SYSTOLIC BLOOD PRESSURE: 106 MMHG | WEIGHT: 93.4 LBS | HEART RATE: 80 BPM | OXYGEN SATURATION: 97 % | BODY MASS INDEX: 19.08 KG/M2 | RESPIRATION RATE: 16 BRPM | DIASTOLIC BLOOD PRESSURE: 72 MMHG | TEMPERATURE: 97.2 F

## 2017-03-13 DIAGNOSIS — F43.21 ADJUSTMENT DISORDER WITH DEPRESSED MOOD: ICD-10-CM

## 2017-03-13 DIAGNOSIS — H60.391 OTHER INFECTIVE ACUTE OTITIS EXTERNA OF RIGHT EAR: Primary | ICD-10-CM

## 2017-03-13 RX ORDER — NEOMYCIN SULFATE, POLYMYXIN B SULFATE, HYDROCORTISONE 3.5; 10000; 1 MG/ML; [USP'U]/ML; MG/ML
4 SOLUTION/ DROPS AURICULAR (OTIC) 2 TIMES DAILY
Qty: 1 BOTTLE | Refills: 0 | Status: SHIPPED | OUTPATIENT
Start: 2017-03-13 | End: 2017-09-06

## 2017-03-13 RX ORDER — MIRTAZAPINE 15 MG/1
15 TABLET, FILM COATED ORAL AT BEDTIME
Qty: 31 TABLET | Refills: 3 | Status: SHIPPED | OUTPATIENT
Start: 2017-03-13 | End: 2017-04-21

## 2017-03-13 ASSESSMENT — ENCOUNTER SYMPTOMS
DYSPHORIC MOOD: 0
FEVER: 0
CHEST TIGHTNESS: 0
TROUBLE SWALLOWING: 0
ADENOPATHY: 0
WEAKNESS: 0
FATIGUE: 0
HEADACHES: 0
PHOTOPHOBIA: 0
WHEEZING: 0
NECK STIFFNESS: 0
ACTIVITY CHANGE: 0
EYE DISCHARGE: 0
EYE REDNESS: 0
SINUS PRESSURE: 0
SHORTNESS OF BREATH: 0
COUGH: 0

## 2017-03-13 NOTE — TELEPHONE ENCOUNTER
Met with pt regarding Ensure.  Called [facility] and Spoke with Vangie.  She said that they were still waiting for insurance information for the pt. She transferred me to X502 and I left message with pt current insurance info.  Told pt mom Brynn to call me if she still does not receive it in the next couple of days.    Rocío Caban  /Care Coordinator

## 2017-03-13 NOTE — LETTER
[facility]  [address],  [address]  [facility] [address]  [phone]  [phone]    3/13/2017    Rupinder Harden[address]  [facility] [address]  [phone] (home)     :  1995    To Whom it May Concern:    Rupinder Jasson missed school on 3/13/2017 due to a scheduled office visit.    Please contact me for any questions or concerns.    Sincerely,      Reuben Mixon MD

## 2017-03-13 NOTE — PROGRESS NOTES
HPI:       Rupinder Spaulding is a 22 year old who presents for the following  Patient presents with:  Ear Problem: ear pain      Acute Illness   Concerns: Ear Pain or itching  When did it start? Several weeks -using q tips  Is it getting better, worse or staying the same? unchanged    Fever?: No     Chills/Sweats?: No     Headache (location?)No     Sinus Pressure?:No     Conjunctivitis?: No     Ear Pain?:  YES      Runny nose?: No     Congestion?: No     Sore Throat?: No      Cough?: no     Wheeze?: No     Decreased Appetite?: No     Nausea?:No     Vomiting?: No     Diarrhea?:  No     Dysuria/Frequency?.:No     Fatigue/Achiness?:No     Sick/Strep Exposure?: No      Therapies Tried and outcome: Nothing    A Subtext  was used for  this visit.      Problem, Medication and Allergy Lists were reviewed and are current.  Patient is an established patient of this clinic.         Review of Systems:   Review of Systems   Constitutional: Negative for activity change, fatigue and fever.   HENT: Positive for ear pain. Negative for sinus pressure and trouble swallowing.         See HPI   Eyes: Negative for photophobia, discharge and redness.   Respiratory: Negative for cough, chest tightness, shortness of breath and wheezing.    Musculoskeletal: Negative for neck stiffness.   Allergic/Immunologic: Negative for environmental allergies.   Neurological: Negative for weakness and headaches.   Hematological: Negative for adenopathy.   Psychiatric/Behavioral: Negative for dysphoric mood.             Physical Exam:   Patient Vitals for the past 24 hrs:   BP Temp Temp src Pulse Resp SpO2 Weight   03/13/17 1106 106/72 97.2  F (36.2  C) Oral 80 16 97 % 93 lb 6.4 oz (42.4 kg)     Body mass index is 19.08 kg/(m^2).  Vitals were reviewed and were normal     Physical Exam      Results:     No results today  Assessment and Plan     Patient Instructions   Here is the plan from today's visit    1. Other infective acute otitis externa of  right ear  Use frayed q tip before the drops then 4 drops two times daily for 7 days then see how it is.   - neomycin-polymyxin-HC 1 % SOLN; Place 4 drops in ear(s) 2 times daily Right ear  Dispense: 1 Bottle; Refill: 0      Please call or return to clinic if your symptoms don't go away.    Follow up plan  Please make a clinic appointment for follow up with me (REUBEN MIXON) in 2-3  months for recheck.    There are no discontinued medications.  Options for treatment and follow-up care were reviewed with the patient. Rupinder Spaulding  engaged in the decision making process and verbalized understanding of the options discussed and agreed with the final plan.    Reuben Mixon MD

## 2017-03-13 NOTE — MR AVS SNAPSHOT
After Visit Summary   3/13/2017    Rupinder Spaulding    MRN: [id number]           Patient Information     Date Of Birth          1995        Visit Information        Provider Department      3/13/2017 10:40 AM Reuben Mixon MD [facility]        Today's Diagnoses     Other infective acute otitis externa of right ear    -  1    Adjustment disorder with depressed mood          Care Instructions    Here is the plan from today's visit    1. Other infective acute otitis externa of right ear  Use frayed q tip before the drops then 4 drops two times daily for 7 days then see how it is.   - neomycin-polymyxin-HC 1 % SOLN; Place 4 drops in ear(s) 2 times daily Right ear  Dispense: 1 Bottle; Refill: 0      Please call or return to clinic if your symptoms don't go away.    Follow up plan  Please make a clinic appointment for follow up with me (REUBEN MIXON) in 2-3  months for recheck.    Thank you for coming to [facility] today.  Lab Testing:  **If you had lab testing today and your results are reassuring or normal they will be mailed to you or sent through AnonymAsk within 7 days.   **If the lab tests need quick action we will call you with the results.  The phone number we will call with results is # [phone] (home) . If this is not the best number please call our clinic and change the number.  Medication Refills:  If you need any refills please call your pharmacy and they will contact us.   If you need to  your refill at a new pharmacy, please contact the new pharmacy directly. The new pharmacy will help you get your medications transferred faster.   Scheduling:  If you have any concerns about today's visit or wish to schedule another appointment please call our office during normal business hours [phone] (8-5:00 M-F)  If a referral was made to a [facility] and you don't get a call from central scheduling please call [phone].  If a Mammogram  was ordered for you at [facility] call [phone] to schedule or change your appointment.  If you had an XRay/CT/Ultrasound/MRI ordered the number is [phone] to schedule or change your radiology appointment.   Medical Concerns:  If you have urgent medical concerns please call [phone] at any time of the day.          Follow-ups after your visit        Who to contact     Please call your clinic at [phone] to:    Ask questions about your health    Make or cancel appointments    Discuss your medicines    Learn about your test results    Speak to your doctor   If you have compliments or concerns about an experience at your clinic, or if you wish to file a complaint, please contact [facility] Patient Relations at [phone] or email us at [email or web address]         Additional Information About Your Visit        BetUknowharTriada Games Information     [email or web address] is an electronic gateway that provides easy, online access to your medical records. With Neptune.io, you can request a clinic appointment, read your test results, renew a prescription or communicate with your care team.     To sign up for [email or web address] visit the website at [email or web address]   You will be asked to enter the access code listed below, as well as some personal information. Please follow the directions to create your username and password.     Your access code is: [email or web address]  Expires: 2017 10:29 AM     Your access code will  in 90 days. If you need help or a new code, please contact your [facility] or call [phone] for assistance.        Care EveryWhere ID     This is your Care EveryWhere ID. This could be used by other organizations to access your [place] medical records  [id number]        Your Vitals Were     Pulse Temperature Respirations Pulse Oximetry Breastfeeding? BMI (Body Mass Index)    80 97.2  F (36.2  C) (Oral) 16 97% No 19.08 kg/m2       Blood  Pressure from Last 3 Encounters:   03/13/17 106/72   02/15/17 107/76   01/20/17 91/60    Weight from Last 3 Encounters:   03/13/17 93 lb 6.4 oz (42.4 kg)   02/15/17 94 lb (42.6 kg)   01/20/17 92 lb 9.6 oz (42 kg)              Today, you had the following     No orders found for display         Today's Medication Changes          These changes are accurate as of: 3/13/17 11:21 AM.  If you have any questions, ask your nurse or doctor.               Start taking these medicines.        Dose/Directions    neomycin-polymyxin-HC 1 % Soln   Used for:  Other infective acute otitis externa of right ear   Started by:  Reuben Mixon MD        Dose:  4 drop   Place 4 drops in ear(s) 2 times daily Right ear   Quantity:  1 Bottle   Refills:  0         Stop taking these medicines if you haven't already. Please contact your care team if you have questions.     naproxen 500 MG tablet   Commonly known as:  NAPROSYN   Stopped by:  Reuben Mixon MD           pseudoePHEDrine 120 MG 12 hr tablet   Commonly known as:  SUDAFED   Stopped by:  Reuben Mixon MD                Where to get your medicines      These [facility] [address] [address] AT SEC OF [address] [address]     Phone:  [phone]     mirtazapine 15 MG tablet    neomycin-polymyxin-HC 1 % Soln                Primary Care Provider Office Phone # Fax #    Reuben Mixon [phone]       [facility] [address] [address]        Thank you!     Thank you for choosing [facility]  for your care. Our goal is always to provide you with excellent care. Hearing back from our patients is one way we can continue to improve our services. Please take a few minutes to complete the written survey that you may receive in the mail after your visit with us. Thank you!             Your Updated Medication List - Protect  others around you: Learn how to safely use, store and throw away your medicines at www.disposemymeds.org.          This list is accurate as of: 3/13/17 11:21 AM.  Always use your most recent med list.                   Brand Name Dispense Instructions for use    fluticasone 50 MCG/ACT spray    FLONASE    16 g    Spray 1-2 sprays into both nostrils daily       Foam Ear Plugs Misc     6 each    1 each daily as needed       mirtazapine 15 MG tablet    REMERON    31 tablet    Take 1 tablet (15 mg) by mouth At Bedtime       neomycin-polymyxin-HC 1 % Soln     1 Bottle    Place 4 drops in ear(s) 2 times daily Right ear       NUTRITIONAL SUPPLEMENT Liqd     30 each    Take 1 Can by mouth 3 times daily       order for DME     60 Can    Ensure or equivalent supplement 2 cans daily       polyethylene glycol powder    MIRALAX    510 g    Take 17 g (1 capful) by mouth daily       vitamin D 58913 UNIT capsule    ERGOCALCIFEROL    12 capsule    1 capsule weekly for 12 weeks return for clinic appointment for recheck at 10-12 weeks

## 2017-03-13 NOTE — PATIENT INSTRUCTIONS
Here is the plan from today's visit    1. Other infective acute otitis externa of right ear  Use frayed q tip before the drops then 4 drops two times daily for 7 days then see how it is.   - neomycin-polymyxin-HC 1 % SOLN; Place 4 drops in ear(s) 2 times daily Right ear  Dispense: 1 Bottle; Refill: 0      Please call or return to clinic if your symptoms don't go away.    Follow up plan  Please make a clinic appointment for follow up with me (JOSE STAHL) in 2-3  months for recheck.    Thank you for coming to [facility] today.  Lab Testing:  **If you had lab testing today and your results are reassuring or normal they will be mailed to you or sent through IgnitionOne within 7 days.   **If the lab tests need quick action we will call you with the results.  The phone number we will call with results is # [phone] (home) . If this is not the best number please call our clinic and change the number.  Medication Refills:  If you need any refills please call your pharmacy and they will contact us.   If you need to  your refill at a new pharmacy, please contact the new pharmacy directly. The new pharmacy will help you get your medications transferred faster.   Scheduling:  If you have any concerns about today's visit or wish to schedule another appointment please call our office during normal business hours [phone] (8-5:00 M-F)  If a referral was made to a [facility] and you don't get a call from central scheduling please call [phone].  If a Mammogram was ordered for you at [facility] call [phone] to schedule or change your appointment.  If you had an XRay/CT/Ultrasound/MRI ordered the number is [phone] to schedule or change your radiology appointment.   Medical Concerns:  If you have urgent medical concerns please call [phone] at any time of the day.

## 2017-04-21 ENCOUNTER — OFFICE VISIT (OUTPATIENT)
Dept: FAMILY MEDICINE | Facility: CLINIC | Age: 22
End: 2017-04-21

## 2017-04-21 VITALS
DIASTOLIC BLOOD PRESSURE: 67 MMHG | HEART RATE: 77 BPM | SYSTOLIC BLOOD PRESSURE: 96 MMHG | BODY MASS INDEX: 18.14 KG/M2 | RESPIRATION RATE: 16 BRPM | WEIGHT: 88.8 LBS | TEMPERATURE: 98 F | OXYGEN SATURATION: 99 %

## 2017-04-21 DIAGNOSIS — F43.21 ADJUSTMENT DISORDER WITH DEPRESSED MOOD: ICD-10-CM

## 2017-04-21 DIAGNOSIS — R64 CACHEXIA (H): Primary | ICD-10-CM

## 2017-04-21 DIAGNOSIS — F84.0 AUTISM SPECTRUM: ICD-10-CM

## 2017-04-21 RX ORDER — MIRTAZAPINE 15 MG/1
15 TABLET, FILM COATED ORAL AT BEDTIME
Qty: 31 TABLET | Refills: 3 | Status: SHIPPED | OUTPATIENT
Start: 2017-04-21 | End: 2017-06-02

## 2017-04-21 RX ORDER — MULTIPLE VITAMINS W/ MINERALS TAB 9MG-400MCG
1 TAB ORAL DAILY
Qty: 100 TABLET | Refills: 3 | Status: SHIPPED | OUTPATIENT
Start: 2017-04-21 | End: 2017-06-02

## 2017-04-21 ASSESSMENT — ENCOUNTER SYMPTOMS
FATIGUE: 0
CONSTIPATION: 0
COLOR CHANGE: 0
BLOOD IN STOOL: 0
SHORTNESS OF BREATH: 0
ABDOMINAL DISTENTION: 0
DYSPHORIC MOOD: 0
SLEEP DISTURBANCE: 0
FEVER: 0
NERVOUS/ANXIOUS: 0
PALPITATIONS: 0
VOMITING: 0
ARTHRALGIAS: 0
NUMBNESS: 0
CHEST TIGHTNESS: 0
EYES NEGATIVE: 1
DYSURIA: 0
MYALGIAS: 0
DIARRHEA: 0
ABDOMINAL PAIN: 0
DIFFICULTY URINATING: 0
POLYDIPSIA: 0
COUGH: 0

## 2017-04-21 ASSESSMENT — ANXIETY QUESTIONNAIRES
1. FEELING NERVOUS, ANXIOUS, OR ON EDGE: NOT AT ALL
6. BECOMING EASILY ANNOYED OR IRRITABLE: SEVERAL DAYS
7. FEELING AFRAID AS IF SOMETHING AWFUL MIGHT HAPPEN: SEVERAL DAYS
GAD7 TOTAL SCORE: 3
2. NOT BEING ABLE TO STOP OR CONTROL WORRYING: NOT AT ALL
IF YOU CHECKED OFF ANY PROBLEMS ON THIS QUESTIONNAIRE, HOW DIFFICULT HAVE THESE PROBLEMS MADE IT FOR YOU TO DO YOUR WORK, TAKE CARE OF THINGS AT HOME, OR GET ALONG WITH OTHER PEOPLE: SOMEWHAT DIFFICULT
3. WORRYING TOO MUCH ABOUT DIFFERENT THINGS: SEVERAL DAYS
5. BEING SO RESTLESS THAT IT IS HARD TO SIT STILL: NOT AT ALL

## 2017-04-21 ASSESSMENT — PATIENT HEALTH QUESTIONNAIRE - PHQ9: 5. POOR APPETITE OR OVEREATING: NOT AT ALL

## 2017-04-21 NOTE — PROGRESS NOTES
HPI:       Rupinder Spaulding is a 22 year old who presents for the following  Patient presents with:  RECHECK: f/u earache. patient reports that she is feeling better   Recheck Medication: patient reports that she does not want to take the Ensure Pluse and would like a different type  Rupinder has recovered from her ear infection though has lost weight as she is not drinking the ensure supplement supplied to her -she has ASD and has sensory issues.     A [employer]  was used for  this visit.      Problem, Medication and Allergy Lists were reviewed and are current.  Patient is an established patient of this clinic.         Review of Systems:   Review of Systems   Constitutional: Negative for fatigue and fever.   HENT: Negative.    Eyes: Negative.  Negative for visual disturbance.   Respiratory: Negative for cough, chest tightness and shortness of breath.    Cardiovascular: Negative for chest pain and palpitations.   Gastrointestinal: Negative for abdominal distention, abdominal pain, blood in stool, constipation, diarrhea and vomiting.   Endocrine: Negative for polydipsia and polyuria.   Genitourinary: Negative for difficulty urinating and dysuria.   Musculoskeletal: Negative for arthralgias and myalgias.   Skin: Negative for color change and rash.   Neurological: Negative for numbness.   Psychiatric/Behavioral: Negative for dysphoric mood and sleep disturbance. The patient is not nervous/anxious.              Physical Exam:   Patient Vitals for the past 24 hrs:   BP Temp Temp src Pulse Resp SpO2 Weight   04/21/17 1022 96/67 98  F (36.7  C) Oral 77 16 99 % 88 lb 12.8 oz (40.3 kg)     Body mass index is 18.14 kg/(m^2).  Vitals were reviewed and were normal     Physical Exam   Constitutional: She is oriented to person, place, and time. She appears well-developed. No distress.   HENT:   Head: Normocephalic.   Eyes: Conjunctivae are normal. No scleral icterus.   Neck: Normal range of motion. No thyromegaly present.    Cardiovascular: Normal rate, regular rhythm, normal heart sounds and intact distal pulses.    No murmur heard.  Pulmonary/Chest: Effort normal and breath sounds normal. No respiratory distress. She has no wheezes.   Abdominal: Soft. Bowel sounds are normal. She exhibits no distension. There is no splenomegaly or hepatomegaly. There is no tenderness.   Musculoskeletal: She exhibits no edema.   Lymphadenopathy:     She has no cervical adenopathy.   Neurological: She is alert and oriented to person, place, and time.   Skin: Skin is warm and dry. She is not diaphoretic.   Psychiatric: She has a normal mood and affect. Her behavior is normal. Judgment and thought content normal.   Vitals reviewed.        Results:       Assessment and Plan     Patient Instructions   Here is the plan from today's visit    1. Cachexia (H)  Chicken and rice and over foods of choice start multivitamin  - Nutritional Supplements (PEDIASURE) LIQD; Take 1 Can by mouth 3 times daily  Dispense: 90 each; Refill: 3  - BEHAVIORAL HEALTH REFERRAL ([facility] inter[facility] and external)    2. Adjustment disorder with depressed mood  conntinut  - mirtazapine (REMERON) 15 MG tablet; Take 1 tablet (15 mg) by mouth At Bedtime  Dispense: 31 tablet; Refill: 3  - BEHAVIORAL HEALTH REFERRAL ([facility] inter[facility] and external)  -if no response in one week please call     3. Autism spectrum    - BEHAVIORAL HEALTH REFERRAL ([facility] inter[facility] and external)      Please call or return to clinic if your symptoms don't go away.    Follow up plan  Please make a clinic appointment for follow up with me (REUBEN MIXON) in one month    for a recheck .        There are no discontinued medications.  Options for treatment and follow-up care were reviewed with the patient. Rupinder Spaulding  engaged in the decision making process and verbalized understanding of the options discussed and agreed with the final plan.    Reuben Mixon MD

## 2017-04-21 NOTE — NURSING NOTE
name: Abdifatah Ocampo  Language: Malian  Agency: [employer]  Phone number: [phone]  Katie Carey

## 2017-04-21 NOTE — PATIENT INSTRUCTIONS
Here is the plan from today's visit    1. Cachexia (H)  Chicken and rice and over foods of choice start multivitamin  - Nutritional Supplements (PEDIASURE) LIQD; Take 1 Can by mouth 3 times daily  Dispense: 90 each; Refill: 3  - BEHAVIORAL HEALTH REFERRAL ([facility] interal and external)    2. Adjustment disorder with depressed mood  conntinut  - mirtazapine (REMERON) 15 MG tablet; Take 1 tablet (15 mg) by mouth At Bedtime  Dispense: 31 tablet; Refill: 3  - BEHAVIORAL HEALTH REFERRAL ([facility] interal and external)  -if no response in one week please call     3. Autism spectrum    - BEHAVIORAL HEALTH REFERRAL ([facility] interal and external)      Please call or return to clinic if your symptoms don't go away.    Follow up plan  Please make a clinic appointment for follow up with me (JOSE STAHL) in one month    for a recheck .    Thank you for coming to [facility] today.  Lab Testing:  **If you had lab testing today and your results are reassuring or normal they will be mailed to you or sent through [employer] within 7 days.   **If the lab tests need quick action we will call you with the results.  The phone number we will call with results is # [phone] (home) . If this is not the best number please call our clinic and change the number.  Medication Refills:  If you need any refills please call your pharmacy and they will contact us.   If you need to  your refill at a new pharmacy, please contact the new pharmacy directly. The new pharmacy will help you get your medications transferred faster.   Scheduling:  If you have any concerns about today's visit or wish to schedule another appointment please call our office during normal business hours [phone] (8-5:00 M-F)  If a referral was made to a [facility] and you don't get a call from central scheduling please call [phone].  If a Mammogram was ordered for you at [facility] call [phone] to schedule or change  your appointment.  If you had an XRay/CT/Ultrasound/MRI ordered the number is [phone] to schedule or change your radiology appointment.   Medical Concerns:  If you have urgent medical concerns please call [phone] at any time of the day.

## 2017-04-22 ASSESSMENT — PATIENT HEALTH QUESTIONNAIRE - PHQ9: SUM OF ALL RESPONSES TO PHQ QUESTIONS 1-9: 13

## 2017-04-22 ASSESSMENT — ANXIETY QUESTIONNAIRES: GAD7 TOTAL SCORE: 3

## 2017-05-01 ENCOUNTER — TELEPHONE (OUTPATIENT)
Dept: FAMILY MEDICINE | Facility: CLINIC | Age: 22
End: 2017-05-01

## 2017-05-01 NOTE — TELEPHONE ENCOUNTER
External Psychiatry Referral:  Dr. Mixon referred this patient for external psychiatry that work with adults with ASD/Developmental disorders.  I called the autism society to see if they had psychiatric services there.  They do not.  She suggested I used their resource list to identify possible alternatives.  I called the [facility] Clinic at the [facility] to see if they would see someone who was 22 years old.  They said they would see her, but that the wait was one year.  I let them know that I would continue to see if I could find other options.      Two other places identified, please provide to patient's mother and assist her as needed to help get an appt scheduled:     [facility] (this looked fairly close to their address on the map)  [address]  [place]  [phone]    Another option would be:    [facility]  [address] [place] [address] [address]  [place]  [phone]  It sounds like the psychiatrists at this agency are independent contractors, so would need to check to see if her insurance is accepted by someone there.

## 2017-05-01 NOTE — LETTER
Dear Rupinder Spaulding    You were recently in to see a physician here at [facility]. At that time, your doctor put in a referral for [facility]ism Clinic .   I have tried to reach you with information on possible places for you to receive these services, but I have been unable to reach you.  For that reason, I am sending this letter with some possible places listed below.  We provide you with more than one possibility, so that you can contact them to identify who might best meet your needs in terms of location and hours available for services.  You are always able to call your insurance company to identify locations that would work for you as well.  Please feel free to call me at the phone number below if you have any questions.    Sincerely,        Tracee Singleton                [facility] (this looked fairly close to their address on the map)  [address]  [place]  [phone]     Another option would be:     [facility]  [address]  [place]  [phone]  I                  Documentation

## 2017-05-12 NOTE — TELEPHONE ENCOUNTER
Blayne Easley - Could you contact this family (I think there is a family member that helps with a lot of the scheduling) and see if they were able to connect to services?  I have also yellow dotted her upcoming appt on 5/19 in case you can't reach them so I can check in with them as well.  Thanks so much!

## 2017-06-02 ENCOUNTER — OFFICE VISIT (OUTPATIENT)
Dept: FAMILY MEDICINE | Facility: CLINIC | Age: 22
End: 2017-06-02

## 2017-06-02 DIAGNOSIS — R64 CACHEXIA (H): ICD-10-CM

## 2017-06-02 DIAGNOSIS — F43.21 ADJUSTMENT DISORDER WITH DEPRESSED MOOD: ICD-10-CM

## 2017-06-02 RX ORDER — MIRTAZAPINE 15 MG/1
15 TABLET, FILM COATED ORAL AT BEDTIME
Qty: 31 TABLET | Refills: 11 | Status: SHIPPED | OUTPATIENT
Start: 2017-06-02 | End: 2017-09-06

## 2017-06-02 RX ORDER — MULTIPLE VITAMINS W/ MINERALS TAB 9MG-400MCG
1 TAB ORAL DAILY
Qty: 100 TABLET | Refills: 3 | Status: SHIPPED | OUTPATIENT
Start: 2017-06-02 | End: 2017-10-10

## 2017-06-02 ASSESSMENT — ENCOUNTER SYMPTOMS
COUGH: 0
ABDOMINAL PAIN: 0
CONSTIPATION: 0
SLEEP DISTURBANCE: 0
CHEST TIGHTNESS: 0
DYSURIA: 0
PALPITATIONS: 0
FATIGUE: 1
DIARRHEA: 0
ABDOMINAL DISTENTION: 0
BLOOD IN STOOL: 0
POLYDIPSIA: 0
EYES NEGATIVE: 1
SHORTNESS OF BREATH: 0
NUMBNESS: 0
VOMITING: 0
FEVER: 0
MYALGIAS: 0
ARTHRALGIAS: 0
COLOR CHANGE: 0
DIFFICULTY URINATING: 0
DYSPHORIC MOOD: 0
NERVOUS/ANXIOUS: 0

## 2017-06-02 NOTE — PATIENT INSTRUCTIONS
Here is the plan from today's visit    1. Cachexia (H)    - multivitamin, therapeutic with minerals (MULTI-VITAMIN) TABS tablet; Take 1 tablet by mouth daily  Dispense: 100 tablet; Refill: 3    2. Adjustment disorder with depressed mood  Restart medication   - mirtazapine (REMERON) 15 MG tablet; Take 1 tablet (15 mg) by mouth At Bedtime  Dispense: 31 tablet; Refill: 11    Encouraged to walk and increase daily exercise.    Please call or return to clinic if your symptoms don't go away.    Follow up plan  Please make a clinic appointment for follow up with me (JOSE STAHL) in two  week for Follow Up.    Thank you for coming to [facility] today.  Lab Testing:  **If you had lab testing today and your results are reassuring or normal they will be mailed to you or sent through [employer] within 7 days.   **If the lab tests need quick action we will call you with the results.  The phone number we will call with results is # [phone] (home) . If this is not the best number please call our clinic and change the number.  Medication Refills:  If you need any refills please call your pharmacy and they will contact us.   If you need to  your refill at a new pharmacy, please contact the new pharmacy directly. The new pharmacy will help you get your medications transferred faster.   Scheduling:  If you have any concerns about today's visit or wish to schedule another appointment please call our office during normal business hours [phone] (8-5:00 M-F)  If a referral was made to a [facility] and you don't get a call from central scheduling please call [phone].  If a Mammogram was ordered for you at [facility] call [phone] to schedule or change your appointment.  If you had an XRay/CT/Ultrasound/MRI ordered the number is [phone] to schedule or change your radiology appointment.   Medical Concerns:  If you have urgent medical concerns please call [phone] at any  time of the day.  If you have a medical emergency please call 911.

## 2017-06-02 NOTE — PROGRESS NOTES
HPI:       Rupinder Spaulding is a 22 year old who presents for the following  Patient presents with:  RECHECK: Pt completed treatment, completed medication foe ear ache  Fatigue: Unable to eat, poor sleeping, 1 month  Refill Request: Multi vitamin, Remeron        Fatigue     Onset   How long have you felt fatigued: 1 month month(s) ago                                              Description: How severe is the fatigue? Just not easting    Does the fatigue interfere with your life:Yes Details: Lack of energy   How much sleep are you getting? 3 hours Hours     How much sleep do you normally need to feel well?             5 hours Hours  Daily exercise: School gym  Are there episodes of normal energy levels: yes    Accompanying Signs & Symptoms:  Falling asleep during the day: no  Snoring: no  Do you stop breathing while sleeping: no                 Night sweats: no  Chest Pain: no  Pain other places in the body? no  Abdominal pain: no  Change in appetite: YES- eats very little                 Weight gain/loss: YES- has loss some weight  Dark or bloody stools: no  Heavy periods (women) YES- regulaur 5/22/17 Substance use:             Caffeine use:no              Alcohol use: never used               Illicit drug use:None    Mood  Depressed mood: YES- Just feeling down on herself  Any new anxiety/stressors:no  Any new deaths or losses? no      Therapies tried  None with no relief.           She ran out of her mirtazepine 2 weeks ago and since then has had poor appetite and sleep.    Difficulty sleeping- patient not sleeping more than 3 hours throughout the night. When sleep is interrupted just play on phone. Takes long naps    Enrolled in summer program- Will also look into getting a job and increase socialization.       A [place]uran  was used for  this visit.      Problem, Medication and Allergy Lists were reviewed and are current.  Patient is an established patient of this clinic.         Review of Systems:    Review of Systems   Constitutional: Positive for fatigue (and poor appetite). Negative for fever.   HENT: Negative.    Eyes: Negative.  Negative for visual disturbance.   Respiratory: Negative for cough, chest tightness and shortness of breath.    Cardiovascular: Negative for chest pain and palpitations.   Gastrointestinal: Negative for abdominal distention, abdominal pain, blood in stool, constipation, diarrhea and vomiting.   Endocrine: Negative for polydipsia and polyuria.   Genitourinary: Negative for difficulty urinating and dysuria.   Musculoskeletal: Negative for arthralgias and myalgias.   Skin: Negative for color change and rash.   Neurological: Negative for numbness.   Psychiatric/Behavioral: Negative for dysphoric mood and sleep disturbance. The patient is not nervous/anxious.              Physical Exam:   No data found.    There is no height or weight on file to calculate BMI.  Vitals were reviewed and were normal     Physical Exam   Constitutional: She is oriented to person, place, and time. She appears well-developed. No distress.   HENT:   Head: Normocephalic.   Eyes: Conjunctivae are normal. No scleral icterus.   Neck: Normal range of motion. No thyromegaly present.   Cardiovascular: Normal rate, regular rhythm, normal heart sounds and intact distal pulses.    No murmur heard.  Pulmonary/Chest: Effort normal and breath sounds normal. No respiratory distress. She has no wheezes.   Abdominal: Soft. Bowel sounds are normal. She exhibits no distension. There is no splenomegaly or hepatomegaly. There is no tenderness.   Musculoskeletal: She exhibits no edema.   Lymphadenopathy:     She has no cervical adenopathy.   Neurological: She is alert and oriented to person, place, and time.   Skin: Skin is warm and dry. She is not diaphoretic.   Psychiatric: She has a normal mood and affect. Her behavior is normal. Judgment and thought content normal.   Vitals reviewed.        Results:     None today  Assessment  and Plan       1. Cachexia (H)  - multivitamin, therapeutic with minerals (MULTI-VITAMIN) TABS tablet; Take 1 tablet by mouth daily  Dispense: 100 tablet; Refill: 3    2. Adjustment disorder with depressed mood  Restart medication   - mirtazapine (REMERON) 15 MG tablet; Take 1 tablet (15 mg) by mouth At Bedtime  Dispense: 31 tablet; Refill: 11    Encouraged to walk and increase daily exercise.    Please call or return to clinic if your symptoms don't go away.    Follow up plan  Please make a clinic appointment for follow up with me (REUBEN MIXON) in two  week for Follow Up.      There are no discontinued medications.  Options for treatment and follow-up care were reviewed with the patient. Rupinder Spaulding  engaged in the decision making process and verbalized understanding of the options discussed and agreed with the final plan.    Reuben Mixon MD

## 2017-06-02 NOTE — MR AVS SNAPSHOT
After Visit Summary   6/2/2017    Rupinder Spaulding    MRN: [id number]           Patient Information     Date Of Birth          1995        Visit Information        Provider Department      6/2/2017 10:40 AM Reuben Mixon MD [facility]        Today's Diagnoses     Cachexia (H)        Adjustment disorder with depressed mood          Care Instructions    Here is the plan from today's visit    1. Cachexia (H)    - multivitamin, therapeutic with minerals (MULTI-VITAMIN) TABS tablet; Take 1 tablet by mouth daily  Dispense: 100 tablet; Refill: 3    2. Adjustment disorder with depressed mood  Restart medication   - mirtazapine (REMERON) 15 MG tablet; Take 1 tablet (15 mg) by mouth At Bedtime  Dispense: 31 tablet; Refill: 11    Encouraged to walk and increase daily exercise.    Please call or return to clinic if your symptoms don't go away.    Follow up plan  Please make a clinic appointment for follow up with me (REUBEN MIXON) in two  week for Follow Up.    Thank you for coming to [facility] today.  Lab Testing:  **If you had lab testing today and your results are reassuring or normal they will be mailed to you or sent through [employer] within 7 days.   **If the lab tests need quick action we will call you with the results.  The phone number we will call with results is # [phone] (home) . If this is not the best number please call our clinic and change the number.  Medication Refills:  If you need any refills please call your pharmacy and they will contact us.   If you need to  your refill at a new pharmacy, please contact the new pharmacy directly. The new pharmacy will help you get your medications transferred faster.   Scheduling:  If you have any concerns about today's visit or wish to schedule another appointment please call our office during normal business hours [phone] (8-5:00 M-F)  If a referral was made to a [facility] and you don't  get a call from central scheduling please call [phone].  If a Mammogram was ordered for you at [facility] call [phone] to schedule or change your appointment.  If you had an XRay/CT/Ultrasound/MRI ordered the number is [phone] to schedule or change your radiology appointment.   Medical Concerns:  If you have urgent medical concerns please call [phone] at any time of the day.  If you have a medical emergency please call 451.            Follow-ups after your visit        Who to contact     Please call your clinic at [phone] to:    Ask questions about your health    Make or cancel appointments    Discuss your medicines    Learn about your test results    Speak to your doctor   If you have compliments or concerns about an experience at your clinic, or if you wish to file a complaint, please contact [facility] Patient Relations at [phone] or email us at [email or web address]         Additional Information About Your Visit        [employer] Information     [employer] is an electronic gateway that provides easy, online access to your medical records. With [employer], you can request a clinic appointment, read your test results, renew a prescription or communicate with your care team.     To sign up for [employer] visit the website at [email or web address]   You will be asked to enter the access code listed below, as well as some personal information. Please follow the directions to create your username and password.     Your access code is: [email or web address]  Expires: 2017 11:01 AM     Your access code will  in 90 days. If you need help or a new code, please contact your [facility] or call [phone] for assistance.        Care EveryWhere ID     This is your Care EveryWhere ID. This could be used by other organizations to access your [facility] medical records  [id number]         Blood Pressure from Last 3 Encounters:    04/21/17 96/67   03/13/17 106/72   02/15/17 107/76    Weight from Last 3 Encounters:   04/21/17 88 lb 12.8 oz (40.3 kg)   03/13/17 93 lb 6.4 oz (42.4 kg)   02/15/17 94 lb (42.6 kg)              Today, you had the following     No orders found for display         Where to get your medicines      These medications were sent to [facility] [id number] [address]  [address] [facility] [address]     Phone:  [phone]     mirtazapine 15 MG tablet    multivitamin, therapeutic with minerals Tabs tablet          Primary Care Provider Office Phone # Fax #    Reuben Mixon [phone]       [facility] [address]   [facility] [address]        Thank you!     Thank you for choosing [facility]  for your care. Our goal is always to provide you with excellent care. Hearing back from our patients is one way we can continue to improve our services. Please take a few minutes to complete the written survey that you may receive in the mail after your visit with us. Thank you!             Your Updated Medication List - Protect others around you: Learn how to safely use, store and throw away your medicines at www.disposemymeds.org.          This list is accurate as of: 6/2/17 11:01 AM.  Always use your most recent med list.                   Brand Name Dispense Instructions for use    fluticasone 50 MCG/ACT spray    FLONASE    16 g    Spray 1-2 sprays into both nostrils daily       Foam Ear Plugs Misc     6 each    1 each daily as needed       mirtazapine 15 MG tablet    REMERON    31 tablet    Take 1 tablet (15 mg) by mouth At Bedtime       multivitamin, therapeutic with minerals Tabs tablet     100 tablet    Take 1 tablet by mouth daily       neomycin-polymyxin-HC 1 % Soln     1 Bottle    Place 4 drops in ear(s) 2 times daily Right ear       * NUTRITIONAL SUPPLEMENT Liqd     30 each    Take 1 Can by mouth 3  times daily       * PEDIASURE Liqd     90 each    Take 1 Can by mouth 3 times daily       order for DME     60 Can    Ensure or equivalent supplement 2 cans daily       polyethylene glycol powder    MIRALAX    510 g    Take 17 g (1 capful) by mouth daily       vitamin D 17133 UNIT capsule    ERGOCALCIFEROL    12 capsule    1 capsule weekly for 12 weeks return for clinic appointment for recheck at 10-12 weeks       * Notice:  This list has 2 medication(s) that are the same as other medications prescribed for you. Read the directions carefully, and ask your doctor or other care provider to review them with you.

## 2017-06-16 ENCOUNTER — OFFICE VISIT (OUTPATIENT)
Dept: FAMILY MEDICINE | Facility: CLINIC | Age: 22
End: 2017-06-16

## 2017-06-16 VITALS
WEIGHT: 87.8 LBS | OXYGEN SATURATION: 100 % | RESPIRATION RATE: 16 BRPM | DIASTOLIC BLOOD PRESSURE: 70 MMHG | HEART RATE: 73 BPM | BODY MASS INDEX: 17.94 KG/M2 | TEMPERATURE: 97.7 F | SYSTOLIC BLOOD PRESSURE: 105 MMHG

## 2017-06-16 DIAGNOSIS — E44.0 MODERATE MALNUTRITION (H): ICD-10-CM

## 2017-06-16 DIAGNOSIS — R63.0 ANOREXIA: Primary | ICD-10-CM

## 2017-06-16 DIAGNOSIS — F43.21 ADJUSTMENT DISORDER WITH DEPRESSED MOOD: ICD-10-CM

## 2017-06-16 ASSESSMENT — ENCOUNTER SYMPTOMS
NEUROLOGICAL NEGATIVE: 1
CHEST TIGHTNESS: 0
CARDIOVASCULAR NEGATIVE: 1
DECREASED CONCENTRATION: 0
RESPIRATORY NEGATIVE: 1
DYSPHORIC MOOD: 0
HALLUCINATIONS: 0
SLEEP DISTURBANCE: 0
CONSTITUTIONAL NEGATIVE: 1
ENDOCRINE NEGATIVE: 1
AGITATION: 0
NERVOUS/ANXIOUS: 0

## 2017-06-16 NOTE — MR AVS SNAPSHOT
After Visit Summary   6/16/2017    Rupinder Spaulding    MRN: [id number]           Patient Information     Date Of Birth          1995        Visit Information        Provider Department      6/16/2017 9:40 AM Reuben Mixon MD [facility]        Today's Diagnoses     Anorexia    -  1    Moderate malnutrition (H)        Adjustment disorder with depressed mood          Care Instructions    Here is the plan from today's visit    1. Anorexia  Take the mirtazepine daily    2. Moderate malnutrition (H)  Continue the supplement    3. Adjustment Disorder with depressed mood  Will try to contact a psychiatrist, and day activity program      Please call or return to clinic if your symptoms don't go away.    Follow up plan  Please make a clinic appointment for follow up with me (REUBEN MIXON) in 1  month for follow up weight. .    Thank you for coming to [facility] today.  Lab Testing:  **If you had lab testing today and your results are reassuring or normal they will be mailed to you or sent through ENOVIX within 7 days.   **If the lab tests need quick action we will call you with the results.  The phone number we will call with results is # [phone] (home) . If this is not the best number please call our clinic and change the number.  Medication Refills:  If you need any refills please call your pharmacy and they will contact us.   If you need to  your refill at a new pharmacy, please contact the new pharmacy directly. The new pharmacy will help you get your medications transferred faster.   Scheduling:  If you have any concerns about today's visit or wish to schedule another appointment please call our office during normal business hours [phone] (8-5:00 M-F)  If a referral was made to a [facility] and you don't get a call from central scheduling please call [phone].  If a Mammogram was ordered for you at [facility] call [phone]  to schedule or change your appointment.  If you had an XRay/CT/Ultrasound/MRI ordered the number is [phone] to schedule or change your radiology appointment.   Medical Concerns:  If you have urgent medical concerns please call [phone] at any time of the day.      [facility]  [address]  [place]   [phone]  2017  10:45am            Follow-ups after your visit        Who to contact     Please call your clinic at [phone] to:    Ask questions about your health    Make or cancel appointments    Discuss your medicines    Learn about your test results    Speak to your doctor   If you have compliments or concerns about an experience at your clinic, or if you wish to file a complaint, please contact [facility] Patient Relations at [phone] or email us at [email or web address]         Additional Information About Your Visit        EktronharAmorelie Information     Virtual View App is an electronic gateway that provides easy, online access to your medical records. With Virtual View App, you can request a clinic appointment, read your test results, renew a prescription or communicate with your care team.     To sign up for Virtual View App visit the website at [email or web address]   You will be asked to enter the access code listed below, as well as some personal information. Please follow the directions to create your username and password.     Your access code is: [email or web address]  Expires: 2017 11:01 AM     Your access code will  in 90 days. If you need help or a new code, please contact your [facility] or call [phone] for assistance.        Care EveryWhere ID     This is your Care EveryWhere ID. This could be used by other organizations to access your [place] medical records  [id number]        Your Vitals Were     Pulse Temperature Respirations Pulse Oximetry Breastfeeding? BMI (Body Mass Index)    73  97.7  F (36.5  C) (Oral) 16 100% No 17.94 kg/m2       Blood Pressure from Last 3 Encounters:   06/16/17 105/70   04/21/17 96/67   03/13/17 106/72    Weight from Last 3 Encounters:   06/16/17 87 lb 12.8 oz (39.8 kg)   04/21/17 88 lb 12.8 oz (40.3 kg)   03/13/17 93 lb 6.4 oz (42.4 kg)              Today, you had the following     No orders found for display       Primary Care Provider Office Phone # Fax #    Reuben Mixon [phone]       [facility] [address] [address]        Thank you!     Thank you for choosing [facility]  for your care. Our goal is always to provide you with excellent care. Hearing back from our patients is one way we can continue to improve our services. Please take a few minutes to complete the written survey that you may receive in the mail after your visit with us. Thank you!             Your Updated Medication List - Protect others around you: Learn how to safely use, store and throw away your medicines at www.disposemymeds.org.          This list is accurate as of: 6/16/17 10:46 AM.  Always use your most recent med list.                   Brand Name Dispense Instructions for use    fluticasone 50 MCG/ACT spray    FLONASE    16 g    Spray 1-2 sprays into both nostrils daily       Foam Ear Plugs Misc     6 each    1 each daily as needed       mirtazapine 15 MG tablet    REMERON    31 tablet    Take 1 tablet (15 mg) by mouth At Bedtime       multivitamin, therapeutic with minerals Tabs tablet     100 tablet    Take 1 tablet by mouth daily       neomycin-polymyxin-HC 1 % Soln     1 Bottle    Place 4 drops in ear(s) 2 times daily Right ear       * NUTRITIONAL SUPPLEMENT Liqd     30 each    Take 1 Can by mouth 3 times daily       * PEDIASURE Liqd     90 each    Take 1 Can by mouth 3 times daily       order for DME     60 Can    Ensure or equivalent supplement 2 cans daily       polyethylene glycol powder    MIRALAX    510 g     Take 17 g (1 capful) by mouth daily       vitamin D 91603 UNIT capsule    ERGOCALCIFEROL    12 capsule    1 capsule weekly for 12 weeks return for clinic appointment for recheck at 10-12 weeks       * Notice:  This list has 2 medication(s) that are the same as other medications prescribed for you. Read the directions carefully, and ask your doctor or other care provider to review them with you.

## 2017-06-16 NOTE — PATIENT INSTRUCTIONS
Here is the plan from today's visit    1. Anorexia  Take the mirtazepine daily    2. Moderate malnutrition (H)  Continue the supplement    3. Adjustment Disorder with depressed mood  Will try to contact a psychiatrist, and day activity program      Please call or return to clinic if your symptoms don't go away.    Follow up plan  Please make a clinic appointment for follow up with me (JOSE STAHL) in 1  month for follow up weight. .    Thank you for coming to [facility] today.  Lab Testing:  **If you had lab testing today and your results are reassuring or normal they will be mailed to you or sent through [employer] within 7 days.   **If the lab tests need quick action we will call you with the results.  The phone number we will call with results is # [phone] (home) . If this is not the best number please call our clinic and change the number.  Medication Refills:  If you need any refills please call your pharmacy and they will contact us.   If you need to  your refill at a new pharmacy, please contact the new pharmacy directly. The new pharmacy will help you get your medications transferred faster.   Scheduling:  If you have any concerns about today's visit or wish to schedule another appointment please call our office during normal business hours [phone] (8-5:00 M-F)  If a referral was made to a [facility] and you don't get a call from central scheduling please call [phone].  If a Mammogram was ordered for you at [facility] call [phone] to schedule or change your appointment.  If you had an XRay/CT/Ultrasound/MRI ordered the number is [phone] to schedule or change your radiology appointment.   Medical Concerns:  If you have urgent medical concerns please call [phone] at any time of the day.      [facility]  [address] [place] [address] [address]  [place]   [phone]  07/11/2017  10:45am  Appointment information  given to  mother.

## 2017-06-16 NOTE — PROGRESS NOTES
HPI:       Rupinder Spaulding is a 22 year old who presents for the following  Patient presents with:  Otalgia: Follow Up        Concern: Poor appetite/Mood   Description of the problem :Pt presents today with c/o poor appetite has not eaten much,     When did it start?: 2 weeks since school has ended    Intensity: moderate    Progression of Symptoms:  worsening    Therapies Tried Ensure supplement drink    What has worked?  Has been taking supplement daily and has been able to drink it with no issues    School has ended and the program during the summer has not started -she is at home all the time and     Hasn't been taking mirtazepine everyday tas mother only gives it to her when she has something in her stomach.     A [employer]  was used for  this visit.      Problem, Medication and Allergy Lists were reviewed and are current.  Patient is an established patient of this clinic.         Review of Systems:   Review of Systems   Constitutional: Negative.    Respiratory: Negative.  Negative for chest tightness.    Cardiovascular: Negative.    Endocrine: Negative.    Neurological: Negative.    Psychiatric/Behavioral: Negative for agitation, decreased concentration, dysphoric mood, hallucinations, sleep disturbance and suicidal ideas. The patient is not nervous/anxious.              Physical Exam:   Patient Vitals for the past 24 hrs:   BP Temp Temp src Pulse Resp SpO2 Weight   06/16/17 0955 105/70 97.7  F (36.5  C) Oral 73 16 100 % 87 lb 12.8 oz (39.8 kg)     Body mass index is 17.94 kg/(m^2).  Vitals were reviewed and were normal     Physical Exam   Constitutional: She is oriented to person, place, and time. She appears well-developed and well-nourished. No distress.   Neurological: She is alert and oriented to person, place, and time.   Psychiatric: She has a normal mood and affect. Her speech is normal and behavior is normal. Judgment and thought content normal. Cognition and memory are normal.   MENTAL STATUS  EXAM  Appearance: appropriate  Attitude: answers with head movements  Behavior: withdrawn   Eye Contact: minimal  Speech: nonverbal  Orientation: unable to assess  Mood: appears more depressed than usual  Affect: Mood Congruent  Thought Process: unable to assess  Hallucination: no     Nursing note and vitals reviewed.  on Reviewing weight      Assessment and Plan     Patient Instructions   Here is the plan from today's visit    1. Anorexia  Take the mirtazepine daily    2. Moderate malnutrition (H)  Continue the supplement    3. Adjustment Disorder with depressed mood-given referral number as below  Will try to contact a psychiatrist, and day activity program      Please call or return to clinic if your symptoms don't go away.    Follow up plan  Please make a clinic appointment for follow up with me (REUBEN MIXON) in 1  month for follow up weight. .      [facility]  [address] [place] [address]   [phone]  07/11/2017  10:45am  Appointment information  given to mother.    There are no discontinued medications.  Options for treatment and follow-up care were reviewed with the patient. Rupinder Spaulding  engaged in the decision making process and verbalized understanding of the options discussed and agreed with the final plan.    Reuben Mixon MD

## 2017-08-08 ENCOUNTER — OFFICE VISIT (OUTPATIENT)
Dept: FAMILY MEDICINE | Facility: CLINIC | Age: 22
End: 2017-08-08

## 2017-08-08 VITALS
WEIGHT: 85.6 LBS | RESPIRATION RATE: 16 BRPM | DIASTOLIC BLOOD PRESSURE: 67 MMHG | BODY MASS INDEX: 17.49 KG/M2 | HEART RATE: 68 BPM | SYSTOLIC BLOOD PRESSURE: 96 MMHG | TEMPERATURE: 97.8 F | OXYGEN SATURATION: 99 %

## 2017-08-08 DIAGNOSIS — K21.9 GASTROESOPHAGEAL REFLUX DISEASE, ESOPHAGITIS PRESENCE NOT SPECIFIED: ICD-10-CM

## 2017-08-08 DIAGNOSIS — R07.0 THROAT PAIN: Primary | ICD-10-CM

## 2017-08-08 LAB — S PYO AG THROAT QL IA.RAPID: NEGATIVE

## 2017-08-08 RX ORDER — IBUPROFEN 400 MG/1
400 TABLET, FILM COATED ORAL EVERY 6 HOURS PRN
Qty: 120 TABLET | Refills: 1 | Status: SHIPPED | OUTPATIENT
Start: 2017-08-08 | End: 2018-02-09

## 2017-08-08 ASSESSMENT — ENCOUNTER SYMPTOMS
APPETITE CHANGE: 1
SINUS PRESSURE: 0
CHEST TIGHTNESS: 1
UNEXPECTED WEIGHT CHANGE: 1
DYSURIA: 0
DIARRHEA: 0
CHILLS: 0
CONSTIPATION: 0
NAUSEA: 1
ARTHRALGIAS: 0
TROUBLE SWALLOWING: 1
VOMITING: 0
WEAKNESS: 0
RHINORRHEA: 0
PALPITATIONS: 0
ABDOMINAL PAIN: 0
NECK PAIN: 1
SORE THROAT: 1
FEVER: 0
COUGH: 1
FACIAL SWELLING: 0
HEADACHES: 1
MYALGIAS: 0
EYE DISCHARGE: 0

## 2017-08-08 NOTE — MR AVS SNAPSHOT
After Visit Summary   8/8/2017    Rupinder Spaulding    MRN: [id number]           Patient Information     Date Of Birth          1995        Visit Information        Provider Department      8/8/2017 9:40 AM Suly Heller MD [facility]        Today's Diagnoses     Throat pain    -  1    Gastroesophageal reflux disease, esophagitis presence not specified          Care Instructions    Here is the plan from today's visit    1. Throat pain  - Group B strep PCR (GBS)  - ibuprofen (ADVIL/MOTRIN) 400 MG tablet; Take 1 tablet (400 mg) by mouth every 6 hours as needed for moderate pain  Dispense: 120 tablet; Refill: 1    2. Gastroesophageal reflux disease, esophagitis presence not specified  - ranitidine (ZANTAC) 150 MG tablet; Take 1 tablet (150 mg) by mouth 2 times daily  Dispense: 60 tablet; Refill: 3  - Calcium Carbonate Antacid 1177 MG CHEW; Take 1 chew tab by mouth 2 times daily as needed  Dispense: 84 tablet; Refill: 3      Please call or return to clinic if your symptoms don't go away.    Follow up plan  Please make a clinic appointment for follow up with me (SULY HELLER) in 1  week for heartburn follow up to see how the medications are working.    Thank you for coming to [facility] today.  Lab Testing:  **If you had lab testing today and your results are reassuring or normal they will be mailed to you or sent through Cyber Interns within 7 days.   **If the lab tests need quick action we will call you with the results.  The phone number we will call with results is # [phone] (home) . If this is not the best number please call our clinic and change the number.  Medication Refills:  If you need any refills please call your pharmacy and they will contact us.   If you need to  your refill at a new pharmacy, please contact the new pharmacy directly. The new pharmacy will help you get your medications transferred faster.   Scheduling:  If you have any concerns  about today's visit or wish to schedule another appointment please call our office during normal business hours [phone] (8-5:00 M-F)  If a referral was made to a [facility] and you don't get a call from central scheduling please call [phone].  If a Mammogram was ordered for you at [facility] call [phone] to schedule or change your appointment.  If you had an XRay/CT/Ultrasound/MRI ordered the number is [phone] to schedule or change your radiology appointment.   Medical Concerns:  If you have urgent medical concerns please call [phone] at any time of the day.  If you have a medical emergency please call [phone].            Follow-ups after your visit        Who to contact     Please call your clinic at [phone] to:    Ask questions about your health    Make or cancel appointments    Discuss your medicines    Learn about your test results    Speak to your doctor   If you have compliments or concerns about an experience at your clinic, or if you wish to file a complaint, please contact [facility] Patient Relations at [phone] or email us at [email or web address]         Additional Information About Your Visit        Nextlyhart Information     Earnestt is an electronic gateway that provides easy, online access to your medical records. With Compass-EOS, you can request a clinic appointment, read your test results, renew a prescription or communicate with your care team.     To sign up for Earnestt visit the website at [email or web address]   You will be asked to enter the access code listed below, as well as some personal information. Please follow the directions to create your username and password.     Your access code is: [email or web address]  Expires: 2017 10:14 AM     Your access code will  in 90 days. If you need help or a new code, please contact your [facility] or call [phone] for  assistance.        Care EveryWhere ID     This is your Care EveryWhere ID. This could be used by other organizations to access your [place] medical records  [id number]        Your Vitals Were     Pulse Temperature Respirations Last Period Pulse Oximetry BMI (Body Mass Index)    68 97.8  F (36.6  C) (Oral) 16 07/24/2017 99% 17.49 kg/m2       Blood Pressure from Last 3 Encounters:   08/08/17 96/67   06/16/17 105/70   04/21/17 96/67    Weight from Last 3 Encounters:   08/08/17 85 lb 9.6 oz (38.8 kg)   06/16/17 87 lb 12.8 oz (39.8 kg)   04/21/17 88 lb 12.8 oz (40.3 kg)              We Performed the Following     Group B strep PCR (GBS)          Today's Medication Changes          These changes are accurate as of: 8/8/17 10:14 AM.  If you have any questions, ask your nurse or doctor.               Start taking these medicines.        Dose/Directions    Calcium Carbonate Antacid 1177 MG Chew   Used for:  Gastroesophageal reflux disease, esophagitis presence not specified   Started by:  Suly Heller MD        Dose:  1 chew tab   Take 1 chew tab by mouth 2 times daily as needed   Quantity:  84 tablet   Refills:  3       ibuprofen 400 MG tablet   Commonly known as:  ADVIL/MOTRIN   Used for:  Throat pain   Started by:  Suly Heller MD        Dose:  400 mg   Take 1 tablet (400 mg) by mouth every 6 hours as needed for moderate pain   Quantity:  120 tablet   Refills:  1       ranitidine 150 MG tablet   Commonly known as:  ZANTAC   Used for:  Gastroesophageal reflux disease, esophagitis presence not specified   Started by:  Suly Heller MD        Dose:  150 mg   Take 1 tablet (150 mg) by mouth 2 times daily   Quantity:  60 tablet   Refills:  3            Where to get your medicines      These medications were sent to [facility] [id number] [address] [facility] [address] [address]     Phone:  [phone]     Calcium Carbonate  Antacid 1177 MG Chew    ibuprofen 400 MG tablet    ranitidine 150 MG tablet                Primary Care Provider Office Phone # Fax #    Reuben Mixon [phone]       [facility] [address] [address]        Equal Access to Services     SIDDHARTHASASHA JON : Hadii ranjith ku hadanalio Soomaali, waaxda luqadaha, qaybta kaalmada adeegyada, waxbrandon lupe annabellen lukeestela denton macario hercules. So [facility] [phone].    ATENCIÓN: Si habla español, tiene a causey disposición servicios gratuitos de asistencia lingüística. Jorge al [phone].    We comply with applicable federal civil rights laws and [place] laws. We do not discriminate on the basis of race, color, national origin, age, disability sex, sexual orientation or gender identity.            Thank you!     Thank you for choosing [facility]  for your care. Our goal is always to provide you with excellent care. Hearing back from our patients is one way we can continue to improve our services. Please take a few minutes to complete the written survey that you may receive in the mail after your visit with us. Thank you!             Your Updated Medication List - Protect others around you: Learn how to safely use, store and throw away your medicines at www.disposemymeds.org.          This list is accurate as of: 8/8/17 10:14 AM.  Always use your most recent med list.                   Brand Name Dispense Instructions for use Diagnosis    Calcium Carbonate Antacid 1177 MG Chew     84 tablet    Take 1 chew tab by mouth 2 times daily as needed    Gastroesophageal reflux disease, esophagitis presence not specified       fluticasone 50 MCG/ACT spray    FLONASE    16 g    Spray 1-2 sprays into both nostrils daily    Allergic rhinitis due to animal hair and dander, unspecified rhinitis seasonality       Foam Ear Plugs Misc     6 each    1 each daily as needed    Episodic tension-type headache, not intractable       ibuprofen 400  MG tablet    ADVIL/MOTRIN    120 tablet    Take 1 tablet (400 mg) by mouth every 6 hours as needed for moderate pain    Throat pain       mirtazapine 15 MG tablet    REMERON    31 tablet    Take 1 tablet (15 mg) by mouth At Bedtime    Adjustment disorder with depressed mood       multivitamin, therapeutic with minerals Tabs tablet     100 tablet    Take 1 tablet by mouth daily    Cachexia (H)       neomycin-polymyxin-HC 1 % Soln     1 Bottle    Place 4 drops in ear(s) 2 times daily Right ear    Other infective acute otitis externa of right ear       * NUTRITIONAL SUPPLEMENT Liqd     30 each    Take 1 Can by mouth 3 times daily    Moderate malnutrition (H)       * PEDIASURE Liqd     90 each    Take 1 Can by mouth 3 times daily    Cachexia (H)       order for DME     60 Can    Ensure or equivalent supplement 2 cans daily    Cachexia (H)       polyethylene glycol powder    MIRALAX    510 g    Take 17 g (1 capful) by mouth daily    Slow transit constipation       ranitidine 150 MG tablet    ZANTAC    60 tablet    Take 1 tablet (150 mg) by mouth 2 times daily    Gastroesophageal reflux disease, esophagitis presence not specified       vitamin D 78158 UNIT capsule    ERGOCALCIFEROL    12 capsule    1 capsule weekly for 12 weeks return for clinic appointment for recheck at 10-12 weeks    Vitamin D deficiency       * Notice:  This list has 2 medication(s) that are the same as other medications prescribed for you. Read the directions carefully, and ask your doctor or other care provider to review them with you.

## 2017-08-08 NOTE — LETTER
August 10, 2017      Rupinder Spaulding  [address]  [place] [address]        Dear Rupinder,    Thank you for getting your care at [facility]. Please see below for your test results. You do not have an infection    Resulted Orders   Strep Screen Rapid (Group) ([facility])   Result Value Ref Range    Rapid Strep A Screen NEGATIVE Negative   Strep Culture ([facility])   Result Value Ref Range    Specimen Description       Throat Specimen collected in eSwab transport (white cap)    Culture Micro No Beta Streptococcus isolated     Micro Report Status FINAL 08/10/2017        If you have any concerns about these results please call and leave a message for me or send a Interactive Project message to the clinic.    Sincerely,    Suly Heller MD

## 2017-08-08 NOTE — PATIENT INSTRUCTIONS
Here is the plan from today's visit    1. Throat pain  - Group B strep PCR (GBS)  - ibuprofen (ADVIL/MOTRIN) 400 MG tablet; Take 1 tablet (400 mg) by mouth every 6 hours as needed for moderate pain  Dispense: 120 tablet; Refill: 1    2. Gastroesophageal reflux disease, esophagitis presence not specified  - ranitidine (ZANTAC) 150 MG tablet; Take 1 tablet (150 mg) by mouth 2 times daily  Dispense: 60 tablet; Refill: 3  - Calcium Carbonate Antacid 1177 MG CHEW; Take 1 chew tab by mouth 2 times daily as needed  Dispense: 84 tablet; Refill: 3      Please call or return to clinic if your symptoms don't go away.    Follow up plan  Please make a clinic appointment for follow up with me (JONATHAN CONNORS) in 1  week for heartburn follow up to see how the medications are working.    Thank you for coming to [facility] today.  Lab Testing:  **If you had lab testing today and your results are reassuring or normal they will be mailed to you or sent through [employer] within 7 days.   **If the lab tests need quick action we will call you with the results.  The phone number we will call with results is # [phone] (home) . If this is not the best number please call our clinic and change the number.  Medication Refills:  If you need any refills please call your pharmacy and they will contact us.   If you need to  your refill at a new pharmacy, please contact the new pharmacy directly. The new pharmacy will help you get your medications transferred faster.   Scheduling:  If you have any concerns about today's visit or wish to schedule another appointment please call our office during normal business hours [phone] (8-5:00 M-F)  If a referral was made to a [facility] and you don't get a call from central scheduling please call [phone].  If a Mammogram was ordered for you at [facility] call [phone] to schedule or change your appointment.  If you had an  XRay/CT/Ultrasound/MRI ordered the number is [phone] to schedule or change your radiology appointment.   Medical Concerns:  If you have urgent medical concerns please call [phone] at any time of the day.  If you have a medical emergency please call [phone].

## 2017-08-08 NOTE — PROGRESS NOTES
HPI:       Rupinder Spaulding is a 22 year old woman with a history of autism spectrum who presents today with her mother for evaluation of a sore throat for one week. She describes pain with swallowing both solids and liquids. She has a decreased appetite because of the pain. It feels like a substernal burning in her chest during meals. She has some nausea but has not vomited. She has been taking ibuprofen for the pain as needed. She has a history of strep throat in the past. She denies fever, congestion, rhinorrhea, and diarrhea. No one around her has been sick. She has had a dry cough recently as well.    Acute Illness   Concerns: sore throat  When did it start? One week ago  Is it getting better, worse or staying the same? unchanged    Fatigue/Achiness?:No     Fever?: No     Chills/Sweats?: No   Headache (location?) YES dull and diffuse, uses ibuprofen as needed    Sinus Pressure?:No     Eye redness/Discharge?: No     Ear Pain?: No     Runny nose?: No     Congestion?: No   Sore Throat?:  YES burning when swallowing and substernal chest pain  Respiratory  Cough?:  YES- dry cough and chest wall tenderness    Wheeze?: No   GI/  Decreased Appetite?:  YES has not been eating due to pain    Nausea?:No     Vomiting?: No     Diarrhea?:  No     Dysuria/Frequency?.:No       Any Illness Exposure?: No     Any foreign travel or contact with anyone ill who travelled abroad? No     Therapies Tried and outcome: ibuprofen for pain    A Nigerien  was used for  this visit.      Problem, Medication and Allergy Lists were reviewed and are current.  Patient is an established patient of this clinic.         Review of Systems:   Review of Systems   Constitutional: Positive for appetite change and unexpected weight change. Negative for chills and fever.        Losing weight and not eating   HENT: Positive for sore throat and trouble swallowing. Negative for congestion, ear pain, facial swelling, mouth sores, rhinorrhea and  sinus pressure.    Eyes: Negative for discharge.   Respiratory: Positive for cough and chest tightness.    Cardiovascular: Negative for palpitations.   Gastrointestinal: Positive for nausea. Negative for abdominal pain, constipation, diarrhea and vomiting.   Genitourinary: Negative for dysuria.   Musculoskeletal: Positive for neck pain. Negative for arthralgias and myalgias.   Skin: Negative for rash.   Neurological: Positive for headaches. Negative for weakness.             Physical Exam:     Patient Vitals for the past 24 hrs:   BP Temp Temp src Pulse Resp SpO2 Weight   08/08/17 0947 96/67 97.8  F (36.6  C) Oral 68 16 99 % 85 lb 9.6 oz (38.8 kg)     Body mass index is 17.49 kg/(m^2).  Vitals were reviewed and were normal     Physical Exam   Constitutional: No distress.   Very thin   HENT:   Head: Normocephalic and atraumatic.   Nose: Nose normal.   Mouth/Throat: No oropharyngeal exudate (erythematous posterior oropharynx).   Eyes: Conjunctivae are normal. Right eye exhibits no discharge. Left eye exhibits no discharge. No scleral icterus.   Neck: Neck supple.   Cardiovascular: Normal rate, regular rhythm and normal heart sounds.  Exam reveals no gallop and no friction rub.    No murmur heard.  Pulmonary/Chest: Effort normal and breath sounds normal. No respiratory distress. She has no wheezes. She has no rales.   Abdominal: Soft. She exhibits no distension and no mass. There is no tenderness. There is no rebound and no guarding.   Musculoskeletal: She exhibits no edema, tenderness or deformity.   Lymphadenopathy:     She has no cervical adenopathy (tenderness to submanidular lymph node palpation).   Neurological: She is alert.   Skin: Skin is warm and dry. No rash noted. She is not diaphoretic. No pallor.       Results:      Results from the last 24 hours  Results for orders placed or performed in visit on 08/08/17 (from the past 24 hour(s))   Strep Screen Rapid (Group) ([facility])   Result Value Ref Range    Rapid  Strep A Screen NEGATIVE Negative      Assessment and Plan     Rupinder Spaulding is a 22 year old woman who presents today with her mother for evaluation of a sore throat for one week.    1. Throat pain  Assessment: Centor criteria would be 1 for possible tender lymphadenopathy. There is no fever or exudates. However, she has a history of strep throat and has a erythematous posterior oropharynx. A rapid strep test was done today and was negative. She may have a viral infection although there are no other URI symptoms. Her chest pain, cough, decreased appetite, and burning with meals is more consistent with GERD as described below.  Plan:  -     ibuprofen (ADVIL/MOTRIN) 400 MG tablet; Take 1 tablet (400 mg) by mouth every 6 hours as needed for moderate pain  -     Strep Screen Rapid (Group) ([facility]) - Negative    2. Gastroesophageal reflux disease, esophagitis presence not specified  Assessment: Symptoms are consistent with GERD. This may be contributing to her decreased appetite and continued malnutrition and weight loss. We counseled her on trying scheduled zantac and tums as needed. She should follow-up in one week to see if her throat pain has improved given her cachexia. We also provided counseling to not take ibuprofen on an empty stomach, drink too much caffeine, or lie flat in bed. We will re-evaluate treatment plan in one week.  Plan:  -     ranitidine (ZANTAC) 150 MG tablet; Take 1 tablet (150 mg) by mouth 2 times daily  -     Calcium Carbonate Antacid 1177 MG CHEW; Take 1 chew tab by mouth 2 times daily as needed  - Follow-up in one week for throat pain and GERD    There are no discontinued medications.  Options for treatment and follow-up care were reviewed with the patient. Rupinder Spaulding  engaged in the decision making process and verbalized understanding of the options discussed and agreed with the final plan.    Agueda Ruiz, MS4    I acted as a scribe during this encounter with Dr. Suly Heller,  MD    The medical student acted as scribe and the encounter documented above was completely performed by myself and the documentation reflects the work I have performed today. Suly Heller MD

## 2017-08-08 NOTE — NURSING NOTE
name: Abdifatah Ocampo  Language: Egyptian  Agency: [employer]  Phone number: [phone]  Katie Carey

## 2017-08-09 NOTE — PROGRESS NOTES
Preceptor Attestation:   Patient seen and discussed with the resident. Assessment and plan reviewed with resident and agreed upon.   Supervising Physician:  Shreyas Washburn MD  [facility]

## 2017-08-10 LAB
BACTERIA SPEC CULT: NORMAL
MICRO REPORT STATUS: NORMAL
SPECIMEN SOURCE: NORMAL

## 2017-09-06 ENCOUNTER — OFFICE VISIT (OUTPATIENT)
Dept: FAMILY MEDICINE | Facility: CLINIC | Age: 22
End: 2017-09-06

## 2017-09-06 VITALS
DIASTOLIC BLOOD PRESSURE: 64 MMHG | RESPIRATION RATE: 20 BRPM | SYSTOLIC BLOOD PRESSURE: 97 MMHG | OXYGEN SATURATION: 99 % | TEMPERATURE: 98.5 F | HEIGHT: 60 IN | WEIGHT: 87 LBS | BODY MASS INDEX: 17.08 KG/M2 | HEART RATE: 60 BPM

## 2017-09-06 DIAGNOSIS — K59.01 SLOW TRANSIT CONSTIPATION: ICD-10-CM

## 2017-09-06 DIAGNOSIS — R63.6 PATIENT UNDERWEIGHT: ICD-10-CM

## 2017-09-06 DIAGNOSIS — F43.21 ADJUSTMENT DISORDER WITH DEPRESSED MOOD: ICD-10-CM

## 2017-09-06 DIAGNOSIS — E55.9 VITAMIN D DEFICIENCY: ICD-10-CM

## 2017-09-06 DIAGNOSIS — J30.1 CHRONIC ALLERGIC RHINITIS DUE TO POLLEN, UNSPECIFIED SEASONALITY: Primary | ICD-10-CM

## 2017-09-06 LAB
ALBUMIN SERPL-MCNC: 4.4 MG/DL (ref 3.8–5)
ALP SERPL-CCNC: 60.8 U/L (ref 31.7–110.5)
ALT SERPL-CCNC: 13.8 U/L (ref 0–45)
AST SERPL-CCNC: 16.1 U/L (ref 0–45)
BILIRUB SERPL-MCNC: 0.5 MG/DL (ref 0.2–1.3)
BUN SERPL-MCNC: 12.5 MG/DL (ref 7–19)
CALCIUM SERPL-MCNC: 10.1 MG/DL (ref 8.5–10.1)
CHLORIDE SERPLBLD-SCNC: 103.8 MMOL/L (ref 98–110)
CO2 SERPL-SCNC: 29.3 MMOL/L (ref 20–32)
CREAT SERPL-MCNC: 0.6 MG/DL (ref 0.5–1)
GFR SERPL CREATININE-BSD FRML MDRD: >90 ML/MIN/1.7 M2
GLUCOSE SERPL-MCNC: 80.8 MG'DL (ref 70–99)
POTASSIUM SERPL-SCNC: 4.3 MMOL/DL (ref 3.3–4.5)
PROT SERPL-MCNC: 7.6 G/DL (ref 6.8–8.8)
SODIUM SERPL-SCNC: 137.3 MMOL/L (ref 132.6–141.4)
VIT B12 SERPL-MCNC: 523 PG/ML (ref 193–986)

## 2017-09-06 RX ORDER — POLYETHYLENE GLYCOL 3350 17 G/17G
1 POWDER, FOR SOLUTION ORAL DAILY
Qty: 510 G | Refills: 11 | Status: SHIPPED | OUTPATIENT
Start: 2017-09-06 | End: 2018-02-09

## 2017-09-06 RX ORDER — MIRTAZAPINE 30 MG/1
30 TABLET, FILM COATED ORAL AT BEDTIME
Qty: 31 TABLET | Refills: 3 | Status: SHIPPED | OUTPATIENT
Start: 2017-09-06 | End: 2017-11-03

## 2017-09-06 RX ORDER — FLUTICASONE PROPIONATE 50 MCG
1-2 SPRAY, SUSPENSION (ML) NASAL DAILY
Qty: 16 G | Refills: 3 | Status: SHIPPED | OUTPATIENT
Start: 2017-09-06 | End: 2018-02-09

## 2017-09-06 ASSESSMENT — ENCOUNTER SYMPTOMS
COUGH: 0
DIFFICULTY URINATING: 0
NUMBNESS: 0
RESPIRATORY NEGATIVE: 1
NEUROLOGICAL NEGATIVE: 1
CHEST TIGHTNESS: 0
SLEEP DISTURBANCE: 1
VOMITING: 0
ABDOMINAL PAIN: 0
DYSURIA: 0
FEVER: 0
ABDOMINAL DISTENTION: 0
CONSTITUTIONAL NEGATIVE: 1
CONSTIPATION: 0
AGITATION: 0
PALPITATIONS: 0
MYALGIAS: 0
ARTHRALGIAS: 0
SHORTNESS OF BREATH: 0
COLOR CHANGE: 0
FATIGUE: 0
POLYDIPSIA: 0
NERVOUS/ANXIOUS: 0
EYES NEGATIVE: 1
HALLUCINATIONS: 0
ENDOCRINE NEGATIVE: 1
CARDIOVASCULAR NEGATIVE: 1
BLOOD IN STOOL: 0
DIARRHEA: 0
DECREASED CONCENTRATION: 0
DYSPHORIC MOOD: 1

## 2017-09-06 NOTE — NURSING NOTE
name: Abdifatah Ocampo  Language: [place] Islander  Agency: [employer]  Phone number: [phone]

## 2017-09-06 NOTE — LETTER
September 6, 2017      Rupinder Spaulding  [address]  [facility] [address]        Dear Rupinder,    Thank you for getting your care at [facility]. Please see below for your test results.  The results are normal  Resulted Orders   Comprehensive Metabolic Panel (LabDA[facility])   Result Value Ref Range    Albumin 4.4 3.8 - 5.0 mg/dL    Alkaline Phosphatase 60.8 31.7 - 110.5 U/L    ALT 13.8 0.0 - 45.0 U/L    AST 16.1 0.0 - 45.0 U/L    Bilirubin Total 0.5 0.2 - 1.3 mg/dL    Urea Nitrogen 12.5 7.0 - 19.0 mg/dL    Calcium 10.1 8.5 - 10.1 mg/dL    Chloride 103.8 98.0 - 110.0 mmol/L    Carbon Dioxide 29.3 20.0 - 32.0 mmol/L    Creatinine 0.6 0.5 - 1.0 mg/dL    Glucose 80.8 70.0 - 99.0 mg'dL    Potassium 4.3 3.3 - 4.5 mmol/dL    Sodium 137.3 132.6 - 141.4 mmol/L    Protein Total 7.6 6.8 - 8.8 g/dL    GFR Estimate >90 >60.0 mL/min/1.7 m2    GFR Estimate If Black >90 >60.0 mL/min/1.7 m2       If you have any concerns about these results please call and leave a message for me or send a MyChart message to the clinic.    Sincerely,    Reuben Mixon MD

## 2017-09-06 NOTE — MR AVS SNAPSHOT
After Visit Summary   9/6/2017    Rupinder Spaulding    MRN: [id number]           Patient Information     Date Of Birth          1995        Visit Information        Provider Department      9/6/2017 1:00 PM Reuben Mixon MD [facility]        Today's Diagnoses     Chronic allergic rhinitis due to pollen, unspecified seasonality    -  1    Patient underweight        Slow transit constipation        Adjustment disorder with depressed mood        Vitamin D deficiency          Care Instructions    Here is the plan from today's visit    1. Chronic allergic rhinitis due to pollen, unspecified seasonality  Restart this medicine  - fluticasone (FLONASE) 50 MCG/ACT spray; Spray 1-2 sprays into both nostrils daily  Dispense: 16 g; Refill: 3    2. Patient underweight  Continue supplement    3. Slow transit constipation  continue  - polyethylene glycol (MIRALAX) powder; Take 17 g (1 capful) by mouth daily  Dispense: 510 g; Refill: 11    4. Adjustment disorder with depressed mood  Will increase mirtazapine   -walk daily  - mirtazapine (REMERON) 30 MG tablet; Take 1 tablet (30 mg) by mouth At Bedtime  Dispense: 31 tablet; Refill: 3      Please call or return to clinic if your symptoms don't go away.    Follow up plan  Please make a clinic appointment for follow up with me (REUBEN MIXON) in one  month for recheck of weight and mood.    Thank you for coming to [facility] today.  Lab Testing:  **If you had lab testing today and your results are reassuring or normal they will be mailed to you or sent through [employer] within 7 days.   **If the lab tests need quick action we will call you with the results.  The phone number we will call with results is # [phone] (home) . If this is not the best number please call our clinic and change the number.  Medication Refills:  If you need any refills please call your pharmacy and they will contact us.   If you need to  your refill at a new  pharmacy, please contact the new pharmacy directly. The new pharmacy will help you get your medications transferred faster.   Scheduling:  If you have any concerns about today's visit or wish to schedule another appointment please call our office during normal business hours [phone] (8-5:00 M-F)  If a referral was made to a [facility] and you don't get a call from central scheduling please call [phone].  If a Mammogram was ordered for you at [facility] call [phone] to schedule or change your appointment.  If you had an XRay/CT/Ultrasound/MRI ordered the number is [phone] to schedule or change your radiology appointment.   Medical Concerns:  If you have urgent medical concerns please call [phone] at any time of the day.            Follow-ups after your visit        Who to contact     Please call your clinic at [phone] to:    Ask questions about your health    Make or cancel appointments    Discuss your medicines    Learn about your test results    Speak to your doctor   If you have compliments or concerns about an experience at your clinic, or if you wish to file a complaint, please contact [facility] Patient Relations at [phone] or email us at [email or web address]         Additional Information About Your Visit        MyChart Information     [employer] is an electronic gateway that provides easy, online access to your medical records. With [employer], you can request a clinic appointment, read your test results, renew a prescription or communicate with your care team.     To sign up for [employer] visit the website at [email or web address]   You will be asked to enter the access code listed below, as well as some personal information. Please follow the directions to create your username and password.     Your access code is: [email or web address]  Expires: 2017 10:14 AM     Your access code will  in 90 days. If you  "need help or a new code, please contact your [facility] or call [phone] for assistance.        Care EveryWhere ID     This is your Care EveryWhere ID. This could be used by other organizations to access your [facility] medical records  [id number]        Your Vitals Were     Pulse Temperature Respirations Height Last Period Pulse Oximetry    60 98.5  F (36.9  C) (Oral) 20 4' 11.84\" (152 cm) 07/24/2017 99%    BMI (Body Mass Index)                   17.08 kg/m2            Blood Pressure from Last 3 Encounters:   09/06/17 97/64   08/08/17 96/67   06/16/17 105/70    Weight from Last 3 Encounters:   09/06/17 87 lb (39.5 kg)   08/08/17 85 lb 9.6 oz (38.8 kg)   06/16/17 87 lb 12.8 oz (39.8 kg)              We Performed the Following     Comprehensive Metabolic Panel (LabDA[facility])     Prealbumin     Vitamin B12     Vitamin D Level          Today's Medication Changes          These changes are accurate as of: 9/6/17  1:24 PM.  If you have any questions, ask your nurse or doctor.               These medicines have changed or have updated prescriptions.        Dose/Directions    mirtazapine 30 MG tablet   Commonly known as:  REMERON   This may have changed:    - medication strength  - how much to take   Used for:  Adjustment disorder with depressed mood   Changed by:  Reuben Mixon MD        Dose:  30 mg   Take 1 tablet (30 mg) by mouth At Bedtime   Quantity:  31 tablet   Refills:  3       PEDIASURE Liqd   This may have changed:  Another medication with the same name was removed. Continue taking this medication, and follow the directions you see here.   Used for:  Cachexia (H)   Changed by:  Reuben Mixon MD        Dose:  1 Can   Take 1 Can by mouth 3 times daily   Quantity:  90 each   Refills:  3         Stop taking these medicines if you haven't already. Please contact your care team if you have questions.     Foam Ear Plugs Misc   Stopped by:  Reuben Mixon MD           " neomycin-polymyxin-HC 1 % Soln   Stopped by:  Reuben Mixon MD           order for DME   Stopped by:  Reuben Mixon MD           vitamin D 18430 UNIT capsule   Commonly known as:  ERGOCALCIFEROL   Stopped by:  Reuben Mixon MD                Where to get your medicines      These medications were sent to [facility] - [place], [place] - [address]  [address], [facility] [address]     Phone:  [phone]     fluticasone 50 MCG/ACT spray    mirtazapine 30 MG tablet    polyethylene glycol powder                Primary Care Provider Office Phone # Fax #    Reuben Mixon [phone]       [address] [facility] [address]  [facility] [address]        Equal Access to Services     [facility] Center[facility] : Hadii ranjith belloo Soshawn, waaxda luqadaha, qaybta kaalmada adeegyada, austin sorto . So [facility] [phone].    ATENCIÓN: Si habla español, tiene a causey disposición servicios gratuitos de asistencia lingüística. Ll[facility] [phone].    We comply with applicable federal civil rights laws and [place] laws. We do not discriminate on the basis of race, color, national origin, age, disability sex, sexual orientation or gender identity.            Thank you!     Thank you for choosing [facility]  for your care. Our goal is always to provide you with excellent care. Hearing back from our patients is one way we can continue to improve our services. Please take a few minutes to complete the written survey that you may receive in the mail after your visit with us. Thank you!             Your Updated Medication List - Protect others around you: Learn how to safely use, store and throw away your medicines at www.disposemymeds.org.          This list is accurate as of: 9/6/17  1:24 PM.  Always use your most recent med list.                   Brand Name Dispense Instructions for use Diagnosis    Calcium Carbonate Antacid 1177 MG Chew     84 tablet     Take 1 chew tab by mouth 2 times daily as needed    Gastroesophageal reflux disease, esophagitis presence not specified       fluticasone 50 MCG/ACT spray    FLONASE    16 g    Spray 1-2 sprays into both nostrils daily    Chronic allergic rhinitis due to pollen, unspecified seasonality       ibuprofen 400 MG tablet    ADVIL/MOTRIN    120 tablet    Take 1 tablet (400 mg) by mouth every 6 hours as needed for moderate pain    Throat pain       mirtazapine 30 MG tablet    REMERON    31 tablet    Take 1 tablet (30 mg) by mouth At Bedtime    Adjustment disorder with depressed mood       multivitamin, therapeutic with minerals Tabs tablet     100 tablet    Take 1 tablet by mouth daily    Cachexia (H)       PEDIASURE Liqd     90 each    Take 1 Can by mouth 3 times daily    Cachexia (H)       polyethylene glycol powder    MIRALAX    510 g    Take 17 g (1 capful) by mouth daily    Slow transit constipation       ranitidine 150 MG tablet    ZANTAC    60 tablet    Take 1 tablet (150 mg) by mouth 2 times daily    Gastroesophageal reflux disease, esophagitis presence not specified

## 2017-09-06 NOTE — LETTER
September 6, 2017      Beatris Spaulding  [address] [facility] [address]            Was needed at home today for a medical appointment for her sister.    Sincerely,    Reuben Mixon MD

## 2017-09-06 NOTE — PROGRESS NOTES
"      HPI:       Rupinder Spaulding is a 22 year old who presents for the following  Patient presents with:  RECHECK: f/u sores in mouth. still hurting, haven't been eating much    22 year old woman with ASD presents with her mother , Rupinder is as usual barely verbal, is much less social as she is not in school.  Mother is concerned because Rupinder is at home and is spending her time sleeping and being on her smart phone. Weight is up 2 pounds.  Rupinder continues to have a poor appetite. Has been told it may be some time before the day program starts.   A [employer]  was used for  this visit.      Problem, Medication and Allergy Lists were reviewed and are current.  Patient is an established patient of this clinic.         Review of Systems:   Review of Systems   Constitutional: Negative.  Negative for fatigue and fever.   HENT: Negative.    Eyes: Negative.  Negative for visual disturbance.   Respiratory: Negative.  Negative for cough, chest tightness and shortness of breath.    Cardiovascular: Negative.  Negative for chest pain and palpitations.   Gastrointestinal: Negative for abdominal distention, abdominal pain, blood in stool, constipation, diarrhea and vomiting.   Endocrine: Negative.  Negative for polydipsia and polyuria.   Genitourinary: Negative for difficulty urinating and dysuria.   Musculoskeletal: Negative for arthralgias and myalgias.   Skin: Negative for color change and rash.   Neurological: Negative.  Negative for numbness.   Psychiatric/Behavioral: Positive for dysphoric mood and sleep disturbance. Negative for agitation, decreased concentration, hallucinations and suicidal ideas. The patient is not nervous/anxious.              Physical Exam:   Patient Vitals for the past 24 hrs:   BP Temp Temp src Pulse Resp SpO2 Height Weight   09/06/17 1258 97/64 98.5  F (36.9  C) Oral 60 20 99 % 4' 11.84\" (152 cm) 87 lb (39.5 kg)     Body mass index is 17.08 kg/(m^2).  Vitals were reviewed and were normal   "   Physical Exam   Constitutional: She is oriented to person, place, and time. She appears well-developed and well-nourished. No distress.   Neurological: She is alert and oriented to person, place, and time.   Psychiatric: She has a normal mood and affect. Her speech is normal and behavior is normal. Judgment and thought content normal. Cognition and memory are normal.   MENTAL STATUS EXAM  Appearance: appropriate  Attitude: answers with head movements  Behavior: withdrawn   Eye Contact: minimal  Speech: nonverbal  Orientation: unable to assess  Mood: appears more depressed than usual  Affect: Mood Congruent  Thought Process: unable to assess  Hallucination: no     Nursing note and vitals reviewed.        Results:      Results from the last 24 hours  Results for orders placed or performed in visit on 09/06/17 (from the past 24 hour(s))   Comprehensive Metabolic Panel (LabDA[facility])   Result Value Ref Range    Albumin 4.4 3.8 - 5.0 mg/dL    Alkaline Phosphatase 60.8 31.7 - 110.5 U/L    ALT 13.8 0.0 - 45.0 U/L    AST 16.1 0.0 - 45.0 U/L    Bilirubin Total 0.5 0.2 - 1.3 mg/dL    Urea Nitrogen 12.5 7.0 - 19.0 mg/dL    Calcium 10.1 8.5 - 10.1 mg/dL    Chloride 103.8 98.0 - 110.0 mmol/L    Carbon Dioxide 29.3 20.0 - 32.0 mmol/L    Creatinine 0.6 0.5 - 1.0 mg/dL    Glucose 80.8 70.0 - 99.0 mg'dL    Potassium 4.3 3.3 - 4.5 mmol/dL    Sodium 137.3 132.6 - 141.4 mmol/L    Protein Total 7.6 6.8 - 8.8 g/dL    GFR Estimate >90 >60.0 mL/min/1.7 m2    GFR Estimate If Black >90 >60.0 mL/min/1.7 m2     Assessment and Plan     Patient Instructions   Here is the plan from today's visit    1. Chronic allergic rhinitis due to pollen, unspecified seasonality  Restart this medicine  - fluticasone (FLONASE) 50 MCG/ACT spray; Spray 1-2 sprays into both nostrils daily  Dispense: 16 g; Refill: 3    2. Patient underweight  Continue supplement    3. Slow transit constipation  continue  - polyethylene glycol (MIRALAX) powder; Take 17 g (1 capful) by  mouth daily  Dispense: 510 g; Refill: 11    4. Adjustment disorder with depressed mood  Will increase mirtazapine   -walk daily  - mirtazapine (REMERON) 30 MG tablet; Take 1 tablet (30 mg) by mouth At Bedtime  Dispense: 31 tablet; Refill: 3      Please call or return to clinic if your symptoms don't go away.    Follow up plan  Please make a clinic appointment for follow up with me (REUBEN MIXON) in one  month for recheck of weight and mood.        There are no discontinued medications.  Options for treatment and follow-up care were reviewed with the patient. Rupinder Spaulding  engaged in the decision making process and verbalized understanding of the options discussed and agreed with the final plan.    Reuben Mixon MD        Body mass index is 17.08 kg/(m^2).  Weight management plan noted, stable and monitoring and return visit in 1 Month

## 2017-09-06 NOTE — PATIENT INSTRUCTIONS
Here is the plan from today's visit    1. Chronic allergic rhinitis due to pollen, unspecified seasonality  Restart this medicine  - fluticasone (FLONASE) 50 MCG/ACT spray; Spray 1-2 sprays into both nostrils daily  Dispense: 16 g; Refill: 3    2. Patient underweight  Continue supplement    3. Slow transit constipation  continue  - polyethylene glycol (MIRALAX) powder; Take 17 g (1 capful) by mouth daily  Dispense: 510 g; Refill: 11    4. Adjustment disorder with depressed mood  Will increase mirtazapine   -walk daily  - mirtazapine (REMERON) 30 MG tablet; Take 1 tablet (30 mg) by mouth At Bedtime  Dispense: 31 tablet; Refill: 3      Please call or return to clinic if your symptoms don't go away.    Follow up plan  Please make a clinic appointment for follow up with me (JOSE STAHL) in one  month for recheck of weight and mood.    Thank you for coming to [facility] today.  Lab Testing:  **If you had lab testing today and your results are reassuring or normal they will be mailed to you or sent through [employer] within 7 days.   **If the lab tests need quick action we will call you with the results.  The phone number we will call with results is # [phone] (home) . If this is not the best number please call our clinic and change the number.  Medication Refills:  If you need any refills please call your pharmacy and they will contact us.   If you need to  your refill at a new pharmacy, please contact the new pharmacy directly. The new pharmacy will help you get your medications transferred faster.   Scheduling:  If you have any concerns about today's visit or wish to schedule another appointment please call our office during normal business hours [phone] (8-5:00 M-F)  If a referral was made to a [facility] and you don't get a call from central scheduling please call [phone].  If a Mammogram was ordered for you at [facility] call [phone] to schedule or  change your appointment.  If you had an XRay/CT/Ultrasound/MRI ordered the number is [phone] to schedule or change your radiology appointment.   Medical Concerns:  If you have urgent medical concerns please call [phone] at any time of the day.

## 2017-09-06 NOTE — LETTER
September 7, 2017      Rupinder Spaulding  [address]  [facility] [address]        Dear Rupinder,    Thank you for getting your care at [facility]. Please see below for your test results.  Rupinder's level of Vit D is normal as are her other labs  Resulted Orders   Vitamin D Level   Result Value Ref Range    Vitamin D Deficiency screening 21 20 - 75 ug/L      Comment:      Season, race, dietary intake, and treatment affect the concentration of   25-hydroxy-Vitamin D. Values may decrease during winter months and increase   during summer months. Values 20-29 ug/L may indicate Vitamin D insufficiency   and values <20 ug/L may indicate Vitamin D deficiency.  Vitamin D determination is routinely performed by an immunoassay specific for   25 hydroxyvitamin D3.  If an individual is on vitamin D2 (ergocalciferol)   supplementation, please specify 25 OH vitamin D2 and D3 level determination by   LCMSMS test VITD23.     Comprehensive Metabolic Panel (LabDAQ)   Result Value Ref Range    Albumin 4.4 3.8 - 5.0 mg/dL    Alkaline Phosphatase 60.8 31.7 - 110.5 U/L    ALT 13.8 0.0 - 45.0 U/L    AST 16.1 0.0 - 45.0 U/L    Bilirubin Total 0.5 0.2 - 1.3 mg/dL    Urea Nitrogen 12.5 7.0 - 19.0 mg/dL    Calcium 10.1 8.5 - 10.1 mg/dL    Chloride 103.8 98.0 - 110.0 mmol/L    Carbon Dioxide 29.3 20.0 - 32.0 mmol/L    Creatinine 0.6 0.5 - 1.0 mg/dL    Glucose 80.8 70.0 - 99.0 mg'dL    Potassium 4.3 3.3 - 4.5 mmol/dL    Sodium 137.3 132.6 - 141.4 mmol/L    Protein Total 7.6 6.8 - 8.8 g/dL    GFR Estimate >90 >60.0 mL/min/1.7 m2    GFR Estimate If Black >90 >60.0 mL/min/1.7 m2   Prealbumin   Result Value Ref Range    Prealbumin 33 15 - 45 mg/dL   Vitamin B12   Result Value Ref Range    Vitamin B12 523 193 - 986 pg/mL       If you have any concerns about these results please call and leave a message for me or send a MyChart message to the clinic.    Sincerely,    Reuben Mixon MD

## 2017-09-07 LAB
DEPRECATED CALCIDIOL+CALCIFEROL SERPL-MC: 21 UG/L (ref 20–75)
PREALB SERPL IA-MCNC: 33 MG/DL (ref 15–45)

## 2017-10-10 ENCOUNTER — OFFICE VISIT (OUTPATIENT)
Dept: FAMILY MEDICINE | Facility: CLINIC | Age: 22
End: 2017-10-10

## 2017-10-10 VITALS
SYSTOLIC BLOOD PRESSURE: 114 MMHG | WEIGHT: 90.4 LBS | BODY MASS INDEX: 17.75 KG/M2 | TEMPERATURE: 97.4 F | HEART RATE: 60 BPM | DIASTOLIC BLOOD PRESSURE: 77 MMHG | RESPIRATION RATE: 18 BRPM | OXYGEN SATURATION: 100 %

## 2017-10-10 DIAGNOSIS — K21.9 GASTROESOPHAGEAL REFLUX DISEASE, ESOPHAGITIS PRESENCE NOT SPECIFIED: ICD-10-CM

## 2017-10-10 DIAGNOSIS — F84.0 AUTISM SPECTRUM: ICD-10-CM

## 2017-10-10 DIAGNOSIS — R64 CACHEXIA (H): ICD-10-CM

## 2017-10-10 RX ORDER — MULTIPLE VITAMINS W/ MINERALS TAB 9MG-400MCG
1 TAB ORAL DAILY
Qty: 100 TABLET | Refills: 3 | Status: SHIPPED | OUTPATIENT
Start: 2017-10-10 | End: 2018-02-09

## 2017-10-10 ASSESSMENT — ENCOUNTER SYMPTOMS
SHORTNESS OF BREATH: 0
NERVOUS/ANXIOUS: 0
DYSPHORIC MOOD: 1
DECREASED CONCENTRATION: 0
EYES NEGATIVE: 1
CHEST TIGHTNESS: 0
FATIGUE: 0
NUMBNESS: 0
COUGH: 0
AGITATION: 0
SLEEP DISTURBANCE: 1
BLOOD IN STOOL: 0
NEUROLOGICAL NEGATIVE: 1
COLOR CHANGE: 0
POLYDIPSIA: 0
ABDOMINAL DISTENTION: 0
RESPIRATORY NEGATIVE: 1
FEVER: 0
HALLUCINATIONS: 0
CONSTIPATION: 0
VOMITING: 0
DYSURIA: 0
CONSTITUTIONAL NEGATIVE: 1
MYALGIAS: 0
PALPITATIONS: 0
ENDOCRINE NEGATIVE: 1
CARDIOVASCULAR NEGATIVE: 1
ARTHRALGIAS: 0
DIARRHEA: 0
ABDOMINAL PAIN: 0
DIFFICULTY URINATING: 0

## 2017-10-10 NOTE — PATIENT INSTRUCTIONS
Here is the plan from today's visit    1. Gastroesophageal reflux disease, esophagitis presence not specified  Start the following   - ranitidine (ZANTAC) 150 MG tablet; Take 1 tablet (150 mg) by mouth 2 times daily  Dispense: 60 tablet; Refill: 3  - Calcium Carbonate Antacid 1177 MG CHEW; Take 1 chew tab by mouth 2 times daily as needed  Dispense: 84 tablet; Refill: 3    2. Cachexia (H)    - multivitamin, therapeutic with minerals (MULTI-VITAMIN) TABS tablet; Take 1 tablet by mouth daily  Dispense: 100 tablet; Refill: 3    3. Gingivitis   Brush gently two times daily and follow up with dentist        Please call or return to clinic if your symptoms don't go away.    Follow up plan  Please make a clinic appointment for follow up with me (JOSE STAHL) in 1-2   months for weight check.    Thank you for coming to [facility] today.  Lab Testing:  **If you had lab testing today and your results are reassuring or normal they will be mailed to you or sent through [employer] within 7 days.   **If the lab tests need quick action we will call you with the results.  The phone number we will call with results is # [phone] (home) . If this is not the best number please call our clinic and change the number.  Medication Refills:  If you need any refills please call your pharmacy and they will contact us.   If you need to  your refill at a new pharmacy, please contact the new pharmacy directly. The new pharmacy will help you get your medications transferred faster.   Scheduling:  If you have any concerns about today's visit or wish to schedule another appointment please call our office during normal business hours [phone] (8-5:00 M-F)  If a referral was made to a [facility] and you don't get a call from central scheduling please call [phone].  If a Mammogram was ordered for you at [facility] call [phone] to schedule or change your appointment.  If you had an  XRay/CT/Ultrasound/MRI ordered the number is [phone] to schedule or change your radiology appointment.   Medical Concerns:  If you have urgent medical concerns please call [phone] at any time of the day.

## 2017-10-10 NOTE — PROGRESS NOTES
HPI:       Rupinder Spaulding is a 22 year old who presents for the following  Patient presents with:her mother who gives the history.   Follow Up    Mental Health Issues/ ASD/Depression  Patient was seen by a psychiatrist ( willow Solano at [facility]  [phone]) and was prescribed a new medication which mother does not know what it was -the medication caused her to be very lethargic and tired, she also drooled on herself. The medication wa stopped by the mom.  They have a scheduled follow up on 10/13.    GERD  Was diagnosed last month though never started the medication (wasn't picked up) continues to have significant GERD.  Also complains of chest wall pain since starting the medication from the mental health clinic.     Bleeding/Sore gums   This has been happening for two weeks, hos not been to a dentist      Problem, Medication and Allergy Lists were reviewed and are current.  Patient is an established patient of this clinic.         Review of Systems:   Review of Systems   Constitutional: Negative.  Negative for fatigue and fever.   HENT: Negative.         Mouth pain   Eyes: Negative.  Negative for visual disturbance.   Respiratory: Negative.  Negative for cough, chest tightness and shortness of breath.    Cardiovascular: Negative.  Negative for chest pain and palpitations.   Gastrointestinal: Negative for abdominal distention, abdominal pain, blood in stool, constipation, diarrhea and vomiting.   Endocrine: Negative.  Negative for polydipsia and polyuria.   Genitourinary: Negative for difficulty urinating and dysuria.   Musculoskeletal: Negative for arthralgias and myalgias.   Skin: Negative for color change and rash.   Neurological: Negative.  Negative for numbness.   Psychiatric/Behavioral: Positive for dysphoric mood and sleep disturbance. Negative for agitation, decreased concentration, hallucinations and suicidal ideas. The patient is not nervous/anxious.              Physical Exam:   Patient Vitals for  the past 24 hrs:   BP Temp Temp src Pulse Resp SpO2 Weight   10/10/17 0811 114/77 97.4  F (36.3  C) Oral 60 18 100 % 90 lb 6.4 oz (41 kg)     Body mass index is 17.75 kg/(m^2).  Vitals were reviewed and were normal     Physical Exam   Constitutional: She is oriented to person, place, and time. She appears well-developed and well-nourished. No distress.   HENT:   Mildly inflamed gums-mild gingivitis,   Teeth appear to be stained -may have some erosion and caries.   Cardiovascular: Normal rate and regular rhythm.    Pulmonary/Chest: She exhibits tenderness (to light palpation).       Neurological: She is alert and oriented to person, place, and time.   Psychiatric: She has a normal mood and affect. Her speech is normal and behavior is normal. Judgment and thought content normal. Cognition and memory are normal.   MENTAL STATUS EXAM  Appearance: appropriate  Attitude: answers with head movements  Behavior: withdrawn   Eye Contact: minimal  Speech: nonverbal  Orientation: unable to assess  Mood: appears more depressed than usual  Affect: Mood Congruent  Thought Process: unable to assess  Hallucination: no     Nursing note and vitals reviewed.      Assessment and Plan     Patient Instructions   Here is the plan from today's visit    1. Gastroesophageal reflux disease, esophagitis presence not specified  Start the following   - ranitidine (ZANTAC) 150 MG tablet; Take 1 tablet (150 mg) by mouth 2 times daily  Dispense: 60 tablet; Refill: 3  - Calcium Carbonate Antacid 1177 MG CHEW; Take 1 chew tab by mouth 2 times daily as needed  Dispense: 84 tablet; Refill: 3    2. Cachexia (H)    - multivitamin, therapeutic with minerals (MULTI-VITAMIN) TABS tablet; Take 1 tablet by mouth daily  Dispense: 100 tablet; Refill: 3    3. Gingivitis   Brush gently two times daily and follow up with dentist        Please call or return to clinic if your symptoms don't go away.    Follow up plan  Please make a clinic appointment for follow up  with me (REUBEN MIXON) in 1-2   months for weight check.      Medications Discontinued During This Encounter   Medication Reason     ranitidine (ZANTAC) 150 MG tablet Reorder     Options for treatment and follow-up care were reviewed with the patient. Rupinder Spaulding  engaged in the decision making process and verbalized understanding of the options discussed and agreed with the final plan.    Reuben Mixon MD

## 2017-10-10 NOTE — MR AVS SNAPSHOT
After Visit Summary   10/10/2017    Rupinder Spaulding    MRN: [id number]           Patient Information     Date Of Birth          1995        Visit Information        Provider Department      10/10/2017 8:20 AM Reuben Mixon MD [facility]        Today's Diagnoses     Gastroesophageal reflux disease, esophagitis presence not specified        Cachexia (H)          Care Instructions    Here is the plan from today's visit    1. Gastroesophageal reflux disease, esophagitis presence not specified  Start the following   - ranitidine (ZANTAC) 150 MG tablet; Take 1 tablet (150 mg) by mouth 2 times daily  Dispense: 60 tablet; Refill: 3  - Calcium Carbonate Antacid 1177 MG CHEW; Take 1 chew tab by mouth 2 times daily as needed  Dispense: 84 tablet; Refill: 3    2. Cachexia (H)    - multivitamin, therapeutic with minerals (MULTI-VITAMIN) TABS tablet; Take 1 tablet by mouth daily  Dispense: 100 tablet; Refill: 3    3. Gingivitis   Brush gently two times daily and follow up with dentist        Please call or return to clinic if your symptoms don't go away.    Follow up plan  Please make a clinic appointment for follow up with me (REUBEN MIXON) in 1-2   months for weight check.    Thank you for coming to [facility] today.  Lab Testing:  **If you had lab testing today and your results are reassuring or normal they will be mailed to you or sent through [employer] within 7 days.   **If the lab tests need quick action we will call you with the results.  The phone number we will call with results is # [phone] (home) . If this is not the best number please call our clinic and change the number.  Medication Refills:  If you need any refills please call your pharmacy and they will contact us.   If you need to  your refill at a new pharmacy, please contact the new pharmacy directly. The new pharmacy will help you get your medications transferred faster.   Scheduling:  If you have any  concerns about today's visit or wish to schedule another appointment please call our office during normal business hours [phone] (8-5:00 M-F)  If a referral was made to a [facility] and you don't get a call from central scheduling please call [phone].  If a Mammogram was ordered for you at [facility] call [phone] to schedule or change your appointment.  If you had an XRay/CT/Ultrasound/MRI ordered the number is [phone] to schedule or change your radiology appointment.   Medical Concerns:  If you have urgent medical concerns please call [phone] at any time of the day.            Follow-ups after your visit        Who to contact     Please call your clinic at [phone] to:    Ask questions about your health    Make or cancel appointments    Discuss your medicines    Learn about your test results    Speak to your doctor   If you have compliments or concerns about an experience at your clinic, or if you wish to file a complaint, please contact [facility] Patient Relations at [phone] or email us at [email or web address]         Additional Information About Your Visit        [employer] Information     [employer] is an electronic gateway that provides easy, online access to your medical records. With [employer], you can request a clinic appointment, read your test results, renew a prescription or communicate with your care team.     To sign up for [employer] visit the website at [email or web address]   You will be asked to enter the access code listed below, as well as some personal information. Please follow the directions to create your username and password.     Your access code is: [email or web address]  Expires: 2017 10:14 AM     Your access code will  in 90 days. If you need help or a new code, please contact your [facility] or call [phone] for assistance.        Care EveryWhere ID      This is your Care EveryWhere ID. This could be used by other organizations to access your [facility] medical records  [id number]        Your Vitals Were     Pulse Temperature Respirations Pulse Oximetry Breastfeeding? BMI (Body Mass Index)    60 97.4  F (36.3  C) (Oral) 18 100% No 17.75 kg/m2       Blood Pressure from Last 3 Encounters:   10/10/17 114/77   09/06/17 97/64   08/08/17 96/67    Weight from Last 3 Encounters:   10/10/17 90 lb 6.4 oz (41 kg)   09/06/17 87 lb (39.5 kg)   08/08/17 85 lb 9.6 oz (38.8 kg)              Today, you had the following     No orders found for display         Where to get your medicines      These medications were sent to [facility] - [place], [place] - [address] [place] [address]     Phone:  [phone]     Calcium Carbonate Antacid 1177 MG Chew    multivitamin, therapeutic with minerals Tabs tablet    ranitidine 150 MG tablet          Primary Care Provider Office Phone # Fax #    Reuben Mixon [phone]       [address] [address]        Equal Access to Services     GITA WEBB AH: Hadii ranjith belloo Soshawn, waaxda luqadaha, qaybta kaalmada adeegyada, austin hercules. So [facility] [phone].    ATENCIÓN: Si habla español, tiene a causey disposición servicios gratuitos de asistencia lingüística. Jorge al [phone].    We comply with applicable federal civil rights laws and [place] laws. We do not discriminate on the basis of race, color, national origin, age, disability, sex, sexual orientation, or gender identity.            Thank you!     Thank you for choosing [facility]  for your care. Our goal is always to provide you with excellent care. Hearing back from our patients is one way we can continue to improve our services. Please take a few minutes to complete the written survey that you may receive in the mail after your visit with us.  Thank you!             Your Updated Medication List - Protect others around you: Learn how to safely use, store and throw away your medicines at www.disposemymeds.org.          This list is accurate as of: 10/10/17  8:36 AM.  Always use your most recent med list.                   Brand Name Dispense Instructions for use Diagnosis    Calcium Carbonate Antacid 1177 MG Chew     84 tablet    Take 1 chew tab by mouth 2 times daily as needed    Gastroesophageal reflux disease, esophagitis presence not specified       fluticasone 50 MCG/ACT spray    FLONASE    16 g    Spray 1-2 sprays into both nostrils daily    Chronic allergic rhinitis due to pollen, unspecified seasonality       ibuprofen 400 MG tablet    ADVIL/MOTRIN    120 tablet    Take 1 tablet (400 mg) by mouth every 6 hours as needed for moderate pain    Throat pain       mirtazapine 30 MG tablet    REMERON    31 tablet    Take 1 tablet (30 mg) by mouth At Bedtime    Adjustment disorder with depressed mood       multivitamin, therapeutic with minerals Tabs tablet     100 tablet    Take 1 tablet by mouth daily    Cachexia (H)       PEDIASURE Liqd     90 each    Take 1 Can by mouth 3 times daily    Cachexia (H)       polyethylene glycol powder    MIRALAX    510 g    Take 17 g (1 capful) by mouth daily    Slow transit constipation       ranitidine 150 MG tablet    ZANTAC    60 tablet    Take 1 tablet (150 mg) by mouth 2 times daily    Gastroesophageal reflux disease, esophagitis presence not specified

## 2017-11-03 DIAGNOSIS — F43.21 ADJUSTMENT DISORDER WITH DEPRESSED MOOD: ICD-10-CM

## 2017-11-03 RX ORDER — MIRTAZAPINE 30 MG/1
30 TABLET, FILM COATED ORAL AT BEDTIME
Qty: 31 TABLET | Refills: 3 | Status: SHIPPED | OUTPATIENT
Start: 2017-11-03 | End: 2017-12-08

## 2017-12-08 ENCOUNTER — OFFICE VISIT (OUTPATIENT)
Dept: FAMILY MEDICINE | Facility: CLINIC | Age: 22
End: 2017-12-08

## 2017-12-08 VITALS
DIASTOLIC BLOOD PRESSURE: 70 MMHG | OXYGEN SATURATION: 98 % | TEMPERATURE: 98.5 F | SYSTOLIC BLOOD PRESSURE: 103 MMHG | BODY MASS INDEX: 18.18 KG/M2 | HEART RATE: 69 BPM | RESPIRATION RATE: 18 BRPM | WEIGHT: 92.6 LBS

## 2017-12-08 DIAGNOSIS — J31.0 CHRONIC RHINITIS, UNSPECIFIED TYPE: Primary | ICD-10-CM

## 2017-12-08 DIAGNOSIS — F43.21 ADJUSTMENT DISORDER WITH DEPRESSED MOOD: ICD-10-CM

## 2017-12-08 RX ORDER — MIRTAZAPINE 30 MG/1
30 TABLET, FILM COATED ORAL AT BEDTIME
Qty: 31 TABLET | Refills: 3 | Status: SHIPPED | OUTPATIENT
Start: 2017-12-08 | End: 2018-02-09

## 2017-12-08 RX ORDER — LORATADINE 10 MG/1
10 TABLET ORAL DAILY
Qty: 90 TABLET | Refills: 1 | Status: SHIPPED | OUTPATIENT
Start: 2017-12-08 | End: 2018-02-09

## 2017-12-08 ASSESSMENT — ENCOUNTER SYMPTOMS
COLOR CHANGE: 0
PALPITATIONS: 0
FEVER: 0
SHORTNESS OF BREATH: 0
DYSURIA: 0
ABDOMINAL DISTENTION: 0
DIFFICULTY URINATING: 0
SORE THROAT: 1
DYSPHORIC MOOD: 0
POLYDIPSIA: 0
NUMBNESS: 0
DIARRHEA: 0
BLOOD IN STOOL: 0
ARTHRALGIAS: 0
MYALGIAS: 0
NERVOUS/ANXIOUS: 0
EYES NEGATIVE: 1
SLEEP DISTURBANCE: 0
VOMITING: 0
CHEST TIGHTNESS: 0
FATIGUE: 0
CONSTIPATION: 0
COUGH: 0
ABDOMINAL PAIN: 0

## 2017-12-08 NOTE — MR AVS SNAPSHOT
After Visit Summary   12/8/2017    Rupinder Spaulding    MRN: [id number]           Patient Information     Date Of Birth          1995        Visit Information        Provider Department      12/8/2017 8:20 AM Reuben Mixon MD [facility]        Today's Diagnoses     Chronic rhinitis, unspecified type    -  1    Adjustment disorder with depressed mood          Care Instructions    Here is the plan from today's visit    1. Adjustment disorder with depressed mood  Continue the mirtazapine, work program would be very helpful  - mirtazapine (REMERON) 30 MG tablet; Take 1 tablet (30 mg) by mouth At Bedtime  Dispense: 31 tablet; Refill: 3    2. Chronic rhinitis, unspecified type  Likely the cause of your sore throat -take this medication daily.  - loratadine (CLARITIN) 10 MG tablet; Take 1 tablet (10 mg) by mouth daily  Dispense: 90 tablet; Refill: 1      Please call or return to clinic if your symptoms don't go away.    Follow up plan  Please make a clinic appointment for follow up with me (REUBEN MIXON) in 2   months for reecheck.    Thank you for coming to [facility] today.  Lab Testing:  **If you had lab testing today and your results are reassuring or normal they will be mailed to you or sent through [employer] within 7 days.   **If the lab tests need quick action we will call you with the results.  The phone number we will call with results is # [phone] (home) . If this is not the best number please call our clinic and change the number.  Medication Refills:  If you need any refills please call your pharmacy and they will contact us.   If you need to  your refill at a new pharmacy, please contact the new pharmacy directly. The new pharmacy will help you get your medications transferred faster.   Scheduling:  If you have any concerns about today's visit or wish to schedule another appointment please call our office during normal business hours [phone] (8-5:00  M-F)  If a referral was made to a [facility] and you don't get a call from central scheduling please call [phone].  If a Mammogram was ordered for you at [facility] call [phone] to schedule or change your appointment.  If you had an XRay/CT/Ultrasound/MRI ordered the number is [phone] to schedule or change your radiology appointment.   Medical Concerns:  If you have urgent medical concerns please call [phone] at any time of the day.            Follow-ups after your visit        Follow-up notes from your care team     Return in about 2 months (around 2018).      Who to contact     Please call your clinic at [phone] to:    Ask questions about your health    Make or cancel appointments    Discuss your medicines    Learn about your test results    Speak to your doctor   If you have compliments or concerns about an experience at your clinic, or if you wish to file a complaint, please contact [facility] Patient Relations at [phone] or email us at [email or web address]         Additional Information About Your Visit        RocketBankharLibriLoop Information     [employer] is an electronic gateway that provides easy, online access to your medical records. With [employer], you can request a clinic appointment, read your test results, renew a prescription or communicate with your care team.     To sign up for [employer] visit the website at [email or web address]   You will be asked to enter the access code listed below, as well as some personal information. Please follow the directions to create your username and password.     Your access code is: [email or web address]  Expires: 3/8/2018  8:38 AM     Your access code will  in 90 days. If you need help or a new code, please contact your [facility] Clinic or call [phone] for assistance.        Care EveryWhere ID     This is your Care EveryWhere ID. This could be used by  other organizations to access your [facility] medical records  [id number]        Your Vitals Were     Pulse Temperature Respirations Pulse Oximetry Breastfeeding? BMI (Body Mass Index)    69 98.5  F (36.9  C) (Oral) 18 98% No 18.18 kg/m2       Blood Pressure from Last 3 Encounters:   12/08/17 103/70   10/10/17 114/77   09/06/17 97/64    Weight from Last 3 Encounters:   12/08/17 92 lb 9.6 oz (42 kg)   10/10/17 90 lb 6.4 oz (41 kg)   09/06/17 87 lb (39.5 kg)              Today, you had the following     No orders found for display         Today's Medication Changes          These changes are accurate as of: 12/8/17  8:38 AM.  If you have any questions, ask your nurse or doctor.               Start taking these medicines.        Dose/Directions    loratadine 10 MG tablet   Commonly known as:  CLARITIN   Used for:  Chronic rhinitis, unspecified type   Started by:  Reuben Mixon MD        Dose:  10 mg   Take 1 tablet (10 mg) by mouth daily   Quantity:  90 tablet   Refills:  1            Where to get your medicines      These medications were sent to [facility] - [place], [place] - [address] [place] [address] [address] [place] [id number]     Phone:  [phone]     loratadine 10 MG tablet    mirtazapine 30 MG tablet                Primary Care Provider Office Phone # Fax #    Reuben Mixon [phone]       [address] [address]        Equal Access to Services     GITA WEBB : Hadii ranjith belloo Soshawn, waaxda luqadaha, qaybta kaalmada adeegyada, waxbrandon lupe hercules. So [facility] [phone].    ATENCIÓN: Si habla español, tiene a causey disposición servicios gratuitos de asistencia lingüística. Llame al [phone].    We comply with applicable federal civil rights laws and [place] laws. We do not discriminate on the basis of race, color, national origin, age, disability, sex, sexual orientation, or gender identity.            Thank  you!     Thank you for choosing [facility]  for your care. Our goal is always to provide you with excellent care. Hearing back from our patients is one way we can continue to improve our services. Please take a few minutes to complete the written survey that you may receive in the mail after your visit with us. Thank you!             Your Updated Medication List - Protect others around you: Learn how to safely use, store and throw away your medicines at www.disposemymeds.org.          This list is accurate as of: 12/8/17  8:38 AM.  Always use your most recent med list.                   Brand Name Dispense Instructions for use Diagnosis    Calcium Carbonate Antacid 1177 MG Chew     84 tablet    Take 1 chew tab by mouth 2 times daily as needed    Gastroesophageal reflux disease, esophagitis presence not specified       fluticasone 50 MCG/ACT spray    FLONASE    16 g    Spray 1-2 sprays into both nostrils daily    Chronic allergic rhinitis due to pollen, unspecified seasonality       ibuprofen 400 MG tablet    ADVIL/MOTRIN    120 tablet    Take 1 tablet (400 mg) by mouth every 6 hours as needed for moderate pain    Throat pain       loratadine 10 MG tablet    CLARITIN    90 tablet    Take 1 tablet (10 mg) by mouth daily    Chronic rhinitis, unspecified type       mirtazapine 30 MG tablet    REMERON    31 tablet    Take 1 tablet (30 mg) by mouth At Bedtime    Adjustment disorder with depressed mood       multivitamin, therapeutic with minerals Tabs tablet     100 tablet    Take 1 tablet by mouth daily    Cachexia (H)       PEDIASURE Liqd     90 each    Take 1 Can by mouth 3 times daily    Cachexia (H)       polyethylene glycol powder    MIRALAX    510 g    Take 17 g (1 capful) by mouth daily    Slow transit constipation       ranitidine 150 MG tablet    ZANTAC    60 tablet    Take 1 tablet (150 mg) by mouth 2 times daily    Gastroesophageal reflux disease, esophagitis presence not specified

## 2017-12-08 NOTE — PROGRESS NOTES
HPI:       Rupinder Spaulding is a 22 year old who presents for the following  Patient presents with:  Mouth Lesions: Follow Up      Allergies?     Onset: years    Description:   Nasal congestion:  YES a little  Sneezing:  YES   Red, itchy eyes: No     Progression of Symptoms:      same waxing and waning    Accompanying Signs & Symptoms:  Cough: No  Wheezing: No   Rash: No   Sinus/facial pain: No     History:   Is it seasonal:   in the fall and during any time of the year   History of asthma: No   Has allergy testing been done: No     What makes it worse?:             Nothing    What makes it better?             nasal spray, Details:    Therapies Tried and outcome: fluticasone, Details better when used    Depression/Anxiety follow up       Status since last visit: Improved      What is going well? Going to activities    Life Stressors:family    Other associated symptoms :autism spectrum disorder    How is your sleep? Good    New Significant life event:Yes-  Wants to work    Current substance abuse: None    Anxiety / Panic symptoms: No     Recent PHQ-9 Scores:     PHQ-9 SCORE 4/21/2017   Total Score 13     Recent NEHA-7 Scores:      NEHA-7 SCORE 4/21/2017   Total Score 3        Aleksandra    Has been steadily gaining weight and eating better     Adherence and Exercise  Medication side effects: no  How often is a medication missed? Less than 1 time per week  Exercise:walking  1 day/week for an average of 15-30 minutes     A [employer]  was used for  this visit.  Presents with her mother    Problem, Medication and Allergy Lists were reviewed and are current.  Patient is an established patient of this clinic.         Review of Systems:   Review of Systems   Constitutional: Negative for fatigue and fever.   HENT: Positive for sore throat (achey at times ).    Eyes: Negative.  Negative for visual disturbance.   Respiratory: Negative for cough, chest tightness and shortness of breath.    Cardiovascular: Negative for  chest pain and palpitations.   Gastrointestinal: Negative for abdominal distention, abdominal pain, blood in stool, constipation, diarrhea and vomiting.   Endocrine: Negative for polydipsia and polyuria.   Genitourinary: Negative for difficulty urinating and dysuria.   Musculoskeletal: Negative for arthralgias and myalgias.   Skin: Negative for color change and rash.   Neurological: Negative for numbness. Headaches: rare.   Psychiatric/Behavioral: Negative for dysphoric mood and sleep disturbance. The patient is not nervous/anxious.              Physical Exam:   Patient Vitals for the past 24 hrs:   BP Temp Temp src Pulse Resp SpO2 Weight   12/08/17 0815 103/70 98.5  F (36.9  C) Oral 69 18 98 % 92 lb 9.6 oz (42 kg)     Body mass index is 18.18 kg/(m^2).  Vitals were reviewed and were normal     Physical Exam   Constitutional: Vital signs are normal. She appears well-developed and well-nourished. She is active. She does not appear ill.   HENT:   Head: Normocephalic.   Right Ear: Tympanic membrane and external ear normal.   Left Ear: Tympanic membrane and external ear normal.   Nose: Mucosal edema and rhinorrhea present. No sinus tenderness. Right sinus exhibits no maxillary sinus tenderness and no frontal sinus tenderness. Left sinus exhibits no maxillary sinus tenderness and no frontal sinus tenderness.   Mouth/Throat: Oropharynx is clear and moist.   Eyes: Conjunctivae and EOM are normal. Pupils are equal, round, and reactive to light.   Neck: Normal range of motion.   Pulmonary/Chest: Effort normal and breath sounds normal. No stridor. No respiratory distress.   Abdominal: Soft.   Skin: She is not diaphoretic.   Psychiatric: She has a normal mood and affect.       Assessment and Plan       1. Adjustment disorder with depressed mood  Continue the mirtazapine, work program would be very helpful to mood  If immigration form needed aske dto follow up for longer visit.  - mirtazapine (REMERON) 30 MG tablet; Take 1  tablet (30 mg) by mouth At Bedtime  Dispense: 31 tablet; Refill: 3    2. Chronic rhinitis, unspecified type  Likely the cause of your sore throat -take this medication daily.  - loratadine (CLARITIN) 10 MG tablet; Take 1 tablet (10 mg) by mouth daily  Dispense: 90 tablet; Refill: 1      Please call or return to clinic if your symptoms don't go away.    Follow up plan  Please make a clinic appointment for follow up with me (REUBEN MIXON) in 2   months for reecheck.    Medications Discontinued During This Encounter   Medication Reason     mirtazapine (REMERON) 30 MG tablet Reorder     Options for treatment and follow-up care were reviewed with the patient. Rupinder Spaulding  engaged in the decision making process and verbalized understanding of the options discussed and agreed with the final plan.    Reuben Mixon MD

## 2017-12-08 NOTE — PATIENT INSTRUCTIONS
Here is the plan from today's visit    1. Adjustment disorder with depressed mood  Continue the mirtazapine, work program would be very helpful  - mirtazapine (REMERON) 30 MG tablet; Take 1 tablet (30 mg) by mouth At Bedtime  Dispense: 31 tablet; Refill: 3    2. Chronic rhinitis, unspecified type  Likely the cause of your sore throat -take this medication daily.  - loratadine (CLARITIN) 10 MG tablet; Take 1 tablet (10 mg) by mouth daily  Dispense: 90 tablet; Refill: 1      Please call or return to clinic if your symptoms don't go away.    Follow up plan  Please make a clinic appointment for follow up with me (JOSE STAHL) in 2   months for ralph.    Thank you for coming to [facility] today.  Lab Testing:  **If you had lab testing today and your results are reassuring or normal they will be mailed to you or sent through [employer] within 7 days.   **If the lab tests need quick action we will call you with the results.  The phone number we will call with results is # [phone] (home) . If this is not the best number please call our clinic and change the number.  Medication Refills:  If you need any refills please call your pharmacy and they will contact us.   If you need to  your refill at a new pharmacy, please contact the new pharmacy directly. The new pharmacy will help you get your medications transferred faster.   Scheduling:  If you have any concerns about today's visit or wish to schedule another appointment please call our office during normal business hours [phone] (8-5:00 M-F)  If a referral was made to a [facility] and you don't get a call from central scheduling please call [phone].  If a Mammogram was ordered for you at [facility] call [phone] to schedule or change your appointment.  If you had an XRay/CT/Ultrasound/MRI ordered the number is [phone] to schedule or change your radiology appointment.   Medical Concerns:  If you have urgent  medical concerns please call [phone] at any time of the day.

## 2017-12-31 ENCOUNTER — HEALTH MAINTENANCE LETTER (OUTPATIENT)
Age: 22
End: 2017-12-31

## 2018-02-09 ENCOUNTER — OFFICE VISIT (OUTPATIENT)
Dept: FAMILY MEDICINE | Facility: CLINIC | Age: 23
End: 2018-02-09
Payer: COMMERCIAL

## 2018-02-09 VITALS
RESPIRATION RATE: 18 BRPM | TEMPERATURE: 97.7 F | DIASTOLIC BLOOD PRESSURE: 72 MMHG | SYSTOLIC BLOOD PRESSURE: 105 MMHG | WEIGHT: 89 LBS | HEART RATE: 80 BPM | OXYGEN SATURATION: 99 % | BODY MASS INDEX: 17.47 KG/M2

## 2018-02-09 DIAGNOSIS — K21.9 GASTROESOPHAGEAL REFLUX DISEASE, ESOPHAGITIS PRESENCE NOT SPECIFIED: ICD-10-CM

## 2018-02-09 DIAGNOSIS — J31.0 CHRONIC RHINITIS, UNSPECIFIED TYPE: ICD-10-CM

## 2018-02-09 DIAGNOSIS — R07.81 RIB PAIN ON LEFT SIDE: Primary | ICD-10-CM

## 2018-02-09 DIAGNOSIS — F43.21 ADJUSTMENT DISORDER WITH DEPRESSED MOOD: ICD-10-CM

## 2018-02-09 DIAGNOSIS — J30.1 CHRONIC ALLERGIC RHINITIS DUE TO POLLEN, UNSPECIFIED SEASONALITY: ICD-10-CM

## 2018-02-09 DIAGNOSIS — K59.01 SLOW TRANSIT CONSTIPATION: ICD-10-CM

## 2018-02-09 DIAGNOSIS — R64 CACHEXIA (H): ICD-10-CM

## 2018-02-09 RX ORDER — IBUPROFEN 400 MG/1
400 TABLET, FILM COATED ORAL EVERY 6 HOURS PRN
Qty: 120 TABLET | Refills: 1 | Status: SHIPPED | OUTPATIENT
Start: 2018-02-09 | End: 2018-07-03

## 2018-02-09 RX ORDER — FLUTICASONE PROPIONATE 50 MCG
1-2 SPRAY, SUSPENSION (ML) NASAL DAILY
Qty: 16 G | Refills: 3 | Status: SHIPPED | OUTPATIENT
Start: 2018-02-09 | End: 2018-07-03

## 2018-02-09 RX ORDER — MULTIPLE VITAMINS W/ MINERALS TAB 9MG-400MCG
1 TAB ORAL DAILY
Qty: 100 TABLET | Refills: 3 | Status: SHIPPED | OUTPATIENT
Start: 2018-02-09 | End: 2018-07-03

## 2018-02-09 RX ORDER — MIRTAZAPINE 30 MG/1
30 TABLET, FILM COATED ORAL AT BEDTIME
Qty: 31 TABLET | Refills: 3 | Status: SHIPPED | OUTPATIENT
Start: 2018-02-09 | End: 2018-03-16

## 2018-02-09 RX ORDER — LORATADINE 10 MG/1
10 TABLET ORAL DAILY
Qty: 90 TABLET | Refills: 1 | Status: SHIPPED | OUTPATIENT
Start: 2018-02-09 | End: 2018-12-18

## 2018-02-09 RX ORDER — POLYETHYLENE GLYCOL 3350 17 G/17G
1 POWDER, FOR SOLUTION ORAL DAILY
Qty: 510 G | Refills: 11 | Status: SHIPPED | OUTPATIENT
Start: 2018-02-09 | End: 2018-07-03

## 2018-02-09 ASSESSMENT — ENCOUNTER SYMPTOMS
NECK STIFFNESS: 0
SINUS PRESSURE: 0
TROUBLE SWALLOWING: 0
DYSPHORIC MOOD: 0
WEAKNESS: 0
FEVER: 0
ADENOPATHY: 0
SHORTNESS OF BREATH: 0
CHEST TIGHTNESS: 0
COUGH: 0
EYE DISCHARGE: 0
PHOTOPHOBIA: 0
HEADACHES: 0
WHEEZING: 0
MYALGIAS: 0
ACTIVITY CHANGE: 0
FATIGUE: 0
EYE REDNESS: 0

## 2018-02-09 NOTE — PROGRESS NOTES
HPI:       Rupinder Spaulding is a 23 year old who presents for the following  Patient presents with:  Throat Pain  Chest Pain      Acute Illness   Concerns: Throat pain, chest pain, cough  When did it start? 1 week ago  Is it getting better, worse or staying the same? better    Fatigue/Achiness?:No     Fever?: No     Chills/Sweats?: No     Headache (location?) YES     Sinus Pressure?:No     Eye redness/Discharge?: No     Ear Pain?: No     Runny nose?: No     Congestion?:  YES     Sore Throat?:  YES   Respiratory    Cough?:  YES-non-productive    Wheeze?: No   GI/    Decreased Appetite?: No     Nausea?:No     Vomiting?: No     Diarrhea?:  No     Dysuria/Frequency?.:No       Any Illness Exposure?: No     Any foreign travel or contact with anyone ill who travelled abroad? No     Therapies Tried and outcome: Nothing taken for Sx    A [employer]  was used for  this visit.    FLANK Pain     Onset: 2 weeks    Description:   Character: Dull ache  Location: left flank-ribs hurt tender with coughing, and with lying on  Radiation: None    Intensity: mild    Progression of Symptoms:  improving    Accompanying Signs & Symptoms:  Fever/Chills?: No   Gas/Bloating: No   Dysuria:No   Hematuria: No   Nausea: No   Vomiting: No   Diarrhea:No   Constipation: NO       History:           Trauma: No   Previous similar pain: No    Previous tests done: none    Precipitating factors:   Does the pain change with:     Food?: no     BM?: no     Urination:no     What makes it better?:  Nothing, mostly complains when in bed    Therapies Tried and outcome: Nothing    LMP:  Jan 30     Weight loss/Anorexia  Having more difficulty getting her to eat. Has ensure in a flavour she doesn't like.  She says   Problem, Medication and Allergy Lists were reviewed and are current.  Patient is an established patient of this clinic.         Review of Systems:   Review of Systems   Constitutional: Negative for activity change, fatigue and fever.   HENT:  Negative for ear pain, sinus pressure and trouble swallowing.         See HPI   Eyes: Negative for photophobia, discharge and redness.   Respiratory: Negative for cough, chest tightness, shortness of breath and wheezing.    Musculoskeletal: Negative for myalgias and neck stiffness.   Allergic/Immunologic: Negative for environmental allergies.   Neurological: Negative for weakness and headaches.   Hematological: Negative for adenopathy.   Psychiatric/Behavioral: Negative for dysphoric mood.             Physical Exam:   Patient Vitals for the past 24 hrs:   BP Temp Temp src Pulse Resp SpO2 Weight   02/09/18 0828 105/72 97.7  F (36.5  C) Oral 80 18 99 % 89 lb (40.4 kg)     Body mass index is 17.47 kg/(m^2).  Vitals were reviewed and were normal     Physical Exam   Constitutional: Vital signs are normal. She appears well-developed and well-nourished. She is active. She does not appear ill.   HENT:   Head: Normocephalic.   Right Ear: Tympanic membrane and external ear normal.   Left Ear: Tympanic membrane and external ear normal.   Nose: Mucosal edema and rhinorrhea present. No sinus tenderness. Right sinus exhibits no maxillary sinus tenderness and no frontal sinus tenderness. Left sinus exhibits no maxillary sinus tenderness and no frontal sinus tenderness.   Mouth/Throat: Oropharynx is clear and moist.   Eyes: Conjunctivae and EOM are normal. Pupils are equal, round, and reactive to light.   Neck: Normal range of motion.   Pulmonary/Chest: Effort normal and breath sounds normal. No stridor. No respiratory distress.   Abdominal: Soft. There is tenderness (over left ribs anterior and posterior).       Skin: She is not diaphoretic.   Psychiatric: She has a normal mood and affect.         Assessment and Plan     1. Rib pain on left side  Start this for the pain -may use ice two times daily   - ibuprofen (ADVIL/MOTRIN) 400 MG tablet; Take 1 tablet (400 mg) by mouth every 6 hours as needed for moderate pain  Dispense: 120  tablet; Refill: 1    2. Chronic rhinitis, unspecified type  restart  - loratadine (CLARITIN) 10 MG tablet; Take 1 tablet (10 mg) by mouth daily  Dispense: 90 tablet; Refill: 1    3. Chronic allergic rhinitis due to pollen, unspecified seasonality  restart  - fluticasone (FLONASE) 50 MCG/ACT spray; Spray 1-2 sprays into both nostrils daily  Dispense: 16 g; Refill: 3    4. Gastroesophageal reflux disease, esophagitis presence not specified  continue  - ranitidine (ZANTAC) 150 MG tablet; Take 1 tablet (150 mg) by mouth 2 times daily  Dispense: 60 tablet; Refill: 3  - Calcium Carbonate Antacid 1177 MG CHEW; Take 1 chew tab by mouth 2 times daily as needed  Dispense: 84 tablet; Refill: 3    5. Cachexia (H)  continue  - multivitamin, therapeutic with minerals (MULTI-VITAMIN) TABS tablet; Take 1 tablet by mouth daily  Dispense: 100 tablet; Refill: 3    6. Adjustment disorder with depressed mood  continue  - mirtazapine (REMERON) 30 MG tablet; Take 1 tablet (30 mg) by mouth At Bedtime  Dispense: 31 tablet; Refill: 3    7. Slow transit constipation  continue  - polyethylene glycol (MIRALAX) powder; Take 17 g (1 capful) by mouth daily  Dispense: 510 g; Refill: 11      Please call or return to clinic if your symptoms don't go away.    Follow up plan  Please make a clinic appointment for follow up with me (REUBEN MIXON) in one  month for recheck weight.    There are no discontinued medications.  Options for treatment and follow-up care were reviewed with the patient. Rupinder Spaulding  engaged in the decision making process and verbalized understanding of the options discussed and agreed with the final plan.    Reuben Mixon MD

## 2018-02-09 NOTE — PATIENT INSTRUCTIONS
Here is the plan from today's visit    1. Rib pain on left side  Start this for the pain -may use ice two times daily   - ibuprofen (ADVIL/MOTRIN) 400 MG tablet; Take 1 tablet (400 mg) by mouth every 6 hours as needed for moderate pain  Dispense: 120 tablet; Refill: 1    2. Chronic rhinitis, unspecified type  restart  - loratadine (CLARITIN) 10 MG tablet; Take 1 tablet (10 mg) by mouth daily  Dispense: 90 tablet; Refill: 1    3. Chronic allergic rhinitis due to pollen, unspecified seasonality  restart  - fluticasone (FLONASE) 50 MCG/ACT spray; Spray 1-2 sprays into both nostrils daily  Dispense: 16 g; Refill: 3    4. Gastroesophageal reflux disease, esophagitis presence not specified  continue  - ranitidine (ZANTAC) 150 MG tablet; Take 1 tablet (150 mg) by mouth 2 times daily  Dispense: 60 tablet; Refill: 3  - Calcium Carbonate Antacid 1177 MG CHEW; Take 1 chew tab by mouth 2 times daily as needed  Dispense: 84 tablet; Refill: 3    5. Cachexia (H)  continue  - multivitamin, therapeutic with minerals (MULTI-VITAMIN) TABS tablet; Take 1 tablet by mouth daily  Dispense: 100 tablet; Refill: 3    6. Adjustment disorder with depressed mood  continue  - mirtazapine (REMERON) 30 MG tablet; Take 1 tablet (30 mg) by mouth At Bedtime  Dispense: 31 tablet; Refill: 3    7. Slow transit constipation  continue  - polyethylene glycol (MIRALAX) powder; Take 17 g (1 capful) by mouth daily  Dispense: 510 g; Refill: 11      Please call or return to clinic if your symptoms don't go away.    Follow up plan  Please make a clinic appointment for follow up with me (JOSE STAHL) in one  month for recheck weight.    Thank you for coming to [facility] today.  Lab Testing:  **If you had lab testing today and your results are reassuring or normal they will be mailed to you or sent through navabi within 7 days.   **If the lab tests need quick action we will call you with the results.  The phone number we will call with results is #  [phone] (home) . If this is not the best number please call our clinic and change the number.  Medication Refills:  If you need any refills please call your pharmacy and they will contact us.   If you need to  your refill at a new pharmacy, please contact the new pharmacy directly. The new pharmacy will help you get your medications transferred faster.   Scheduling:  If you have any concerns about today's visit or wish to schedule another appointment please call our office during normal business hours [phone] (8-5:00 M-F)  If a referral was made to a [facility] and you don't get a call from central scheduling please call [phone].  If a Mammogram was ordered for you at [facility] call [phone] to schedule or change your appointment.  If you had an XRay/CT/Ultrasound/MRI ordered the number is [phone] to schedule or change your radiology appointment.   Medical Concerns:  If you have urgent medical concerns please call [phone] at any time of the day.

## 2018-02-09 NOTE — MR AVS SNAPSHOT
After Visit Summary   2/9/2018    Rupinder Spaulding    MRN: [id number]           Patient Information     Date Of Birth          1995        Visit Information        Provider Department      2/9/2018 8:20 AM Reuben Mixon MD [facility]        Today's Diagnoses     Rib pain on left side    -  1    Chronic rhinitis, unspecified type        Chronic allergic rhinitis due to pollen, unspecified seasonality        Gastroesophageal reflux disease, esophagitis presence not specified        Cachexia (H)        Adjustment disorder with depressed mood        Slow transit constipation          Care Instructions    Here is the plan from today's visit    1. Rib pain on left side  Start this for the pain -may use ice two times daily   - ibuprofen (ADVIL/MOTRIN) 400 MG tablet; Take 1 tablet (400 mg) by mouth every 6 hours as needed for moderate pain  Dispense: 120 tablet; Refill: 1    2. Chronic rhinitis, unspecified type  restart  - loratadine (CLARITIN) 10 MG tablet; Take 1 tablet (10 mg) by mouth daily  Dispense: 90 tablet; Refill: 1    3. Chronic allergic rhinitis due to pollen, unspecified seasonality  restart  - fluticasone (FLONASE) 50 MCG/ACT spray; Spray 1-2 sprays into both nostrils daily  Dispense: 16 g; Refill: 3    4. Gastroesophageal reflux disease, esophagitis presence not specified  continue  - ranitidine (ZANTAC) 150 MG tablet; Take 1 tablet (150 mg) by mouth 2 times daily  Dispense: 60 tablet; Refill: 3  - Calcium Carbonate Antacid 1177 MG CHEW; Take 1 chew tab by mouth 2 times daily as needed  Dispense: 84 tablet; Refill: 3    5. Cachexia (H)  continue  - multivitamin, therapeutic with minerals (MULTI-VITAMIN) TABS tablet; Take 1 tablet by mouth daily  Dispense: 100 tablet; Refill: 3    6. Adjustment disorder with depressed mood  continue  - mirtazapine (REMERON) 30 MG tablet; Take 1 tablet (30 mg) by mouth At Bedtime  Dispense: 31 tablet; Refill: 3    7. Slow transit  constipation  continue  - polyethylene glycol (MIRALAX) powder; Take 17 g (1 capful) by mouth daily  Dispense: 510 g; Refill: 11      Please call or return to clinic if your symptoms don't go away.    Follow up plan  Please make a clinic appointment for follow up with me (MARIBETH JOSE) in one  month for recheck weight.    Thank you for coming to [facility] today.  Lab Testing:  **If you had lab testing today and your results are reassuring or normal they will be mailed to you or sent through [employer] within 7 days.   **If the lab tests need quick action we will call you with the results.  The phone number we will call with results is # [phone] (home) . If this is not the best number please call our clinic and change the number.  Medication Refills:  If you need any refills please call your pharmacy and they will contact us.   If you need to  your refill at a new pharmacy, please contact the new pharmacy directly. The new pharmacy will help you get your medications transferred faster.   Scheduling:  If you have any concerns about today's visit or wish to schedule another appointment please call our office during normal business hours [phone] (8-5:00 M-F)  If a referral was made to a [facility] and you don't get a call from central scheduling please call [phone].  If a Mammogram was ordered for you at [facility] call [phone] to schedule or change your appointment.  If you had an XRay/CT/Ultrasound/MRI ordered the number is [phone] to schedule or change your radiology appointment.   Medical Concerns:  If you have urgent medical concerns please call [phone] at any time of the day.            Follow-ups after your visit        Follow-up notes from your care team     Return in about 4 weeks (around 3/9/2018) for weight check.      Who to contact     Please call your clinic at [phone] to:    Ask questions about your health    Make or cancel  appointments    Discuss your medicines    Learn about your test results    Speak to your doctor            Additional Information About Your Visit        MyChart Information     S[employer]® [employer] is an electronic gateway that provides easy, online access to your medical records. With [employer], you can request a clinic appointment, read your test results, renew a prescription or communicate with your care team.     To sign up for [employer] visit the website at [email or web address]   You will be asked to enter the access code listed below, as well as some personal information. Please follow the directions to create your username and password.     Your access code is: [email or web address]  Expires: 3/8/2018  8:38 AM     Your access code will  in 90 days. If you need help or a new code, please contact your [facility] or call [phone] for assistance.        Care EveryWhere ID     This is your Care EveryWhere ID. This could be used by other organizations to access your [place] medical records  [id number]        Your Vitals Were     Pulse Temperature Respirations Pulse Oximetry Breastfeeding? BMI (Body Mass Index)    80 97.7  F (36.5  C) (Oral) 18 99% No 17.47 kg/m2       Blood Pressure from Last 3 Encounters:   18 105/72   17 103/70   10/10/17 114/77    Weight from Last 3 Encounters:   18 89 lb (40.4 kg)   17 92 lb 9.6 oz (42 kg)   10/10/17 90 lb 6.4 oz (41 kg)              Today, you had the following     No orders found for display         Where to get your medicines      These medications were sent to [facility] - [place], [place] - [address] [place] [address]  [address] [facility] [address] [place] [address]     Phone:  [phone]     Calcium Carbonate Antacid 1177 MG Chew    fluticasone 50 MCG/ACT spray    ibuprofen 400 MG tablet    loratadine 10 MG tablet    mirtazapine 30 MG tablet    multivitamin, therapeutic with minerals Tabs tablet    polyethylene glycol powder     ranitidine 150 MG tablet          Primary Care Provider Office Phone # Fax #    Reuben Mixon [phone]       [address] [address]        Equal Access to Services     GITA WEBB : Hadii aad ku hadaida Moody, wagailda luqadaha, qaybta kaalmada jamison, austin denton macario hercules. So [facility] [phone].    ATENCIÓN: Si habla español, tiene a causey disposición servicios gratuitos de asistencia lingüística. [facility] [phone].    We comply with applicable federal civil rights laws and [place] laws. We do not discriminate on the basis of race, color, national origin, age, disability, sex, sexual orientation, or gender identity.            Thank you!     Thank you for choosing [facility]  for your care. Our goal is always to provide you with excellent care. Hearing back from our patients is one way we can continue to improve our services. Please take a few minutes to complete the written survey that you may receive in the mail after your visit with us. Thank you!             Your Updated Medication List - Protect others around you: Learn how to safely use, store and throw away your medicines at www.disposemymeds.org.          This list is accurate as of 2/9/18  8:59 AM.  Always use your most recent med list.                   Brand Name Dispense Instructions for use Diagnosis    Calcium Carbonate Antacid 1177 MG Chew     84 tablet    Take 1 chew tab by mouth 2 times daily as needed    Gastroesophageal reflux disease, esophagitis presence not specified       fluticasone 50 MCG/ACT spray    FLONASE    16 g    Spray 1-2 sprays into both nostrils daily    Chronic allergic rhinitis due to pollen, unspecified seasonality       ibuprofen 400 MG tablet    ADVIL/MOTRIN    120 tablet    Take 1 tablet (400 mg) by mouth every 6 hours as needed for moderate pain    Rib pain on left side       loratadine 10 MG tablet    CLARITIN    90 tablet     Take 1 tablet (10 mg) by mouth daily    Chronic rhinitis, unspecified type       mirtazapine 30 MG tablet    REMERON    31 tablet    Take 1 tablet (30 mg) by mouth At Bedtime    Adjustment disorder with depressed mood       multivitamin, therapeutic with minerals Tabs tablet     100 tablet    Take 1 tablet by mouth daily    Cachexia (H)       PEDIASURE Liqd     90 each    Take 1 Can by mouth 3 times daily    Cachexia (H)       polyethylene glycol powder    MIRALAX    510 g    Take 17 g (1 capful) by mouth daily    Slow transit constipation       ranitidine 150 MG tablet    ZANTAC    60 tablet    Take 1 tablet (150 mg) by mouth 2 times daily    Gastroesophageal reflux disease, esophagitis presence not specified

## 2018-03-16 ENCOUNTER — OFFICE VISIT (OUTPATIENT)
Dept: FAMILY MEDICINE | Facility: CLINIC | Age: 23
End: 2018-03-16
Payer: COMMERCIAL

## 2018-03-16 VITALS
SYSTOLIC BLOOD PRESSURE: 95 MMHG | HEART RATE: 72 BPM | DIASTOLIC BLOOD PRESSURE: 62 MMHG | WEIGHT: 88.4 LBS | BODY MASS INDEX: 17.36 KG/M2 | TEMPERATURE: 97.7 F | OXYGEN SATURATION: 96 %

## 2018-03-16 DIAGNOSIS — E44.0 MODERATE MALNUTRITION (H): Primary | ICD-10-CM

## 2018-03-16 DIAGNOSIS — F43.21 ADJUSTMENT DISORDER WITH DEPRESSED MOOD: ICD-10-CM

## 2018-03-16 RX ORDER — MIRTAZAPINE 45 MG/1
45 TABLET, FILM COATED ORAL AT BEDTIME
Qty: 31 TABLET | Refills: 3 | Status: SHIPPED | OUTPATIENT
Start: 2018-03-16 | End: 2018-04-23

## 2018-03-16 ASSESSMENT — ENCOUNTER SYMPTOMS
EYE DISCHARGE: 0
TROUBLE SWALLOWING: 0
FATIGUE: 0
CHEST TIGHTNESS: 0
WHEEZING: 0
PHOTOPHOBIA: 0
SHORTNESS OF BREATH: 0
COUGH: 0
HEADACHES: 0
WEAKNESS: 0
NECK STIFFNESS: 0
ADENOPATHY: 0
SINUS PRESSURE: 0
ACTIVITY CHANGE: 0
FEVER: 0
EYE REDNESS: 0
MYALGIAS: 0
DYSPHORIC MOOD: 0

## 2018-03-16 NOTE — MR AVS SNAPSHOT
After Visit Summary   3/16/2018    Rupinder Spaulding    MRN: [id number]           Patient Information     Date Of Birth          1995        Visit Information        Provider Department      3/16/2018 8:20 AM Reuben Mixon MD [facility]        Today's Diagnoses     Moderate malnutrition (H)    -  1    Adjustment disorder with depressed mood          Care Instructions    Here is the plan from today's visit    1. Adjustment disorder with depressed mood  Will increase the medication as below  - mirtazapine (REMERON) 45 MG tablet; Take 1 tablet (45 mg) by mouth At Bedtime  Dispense: 31 tablet; Refill: 3    2. Moderate malnutrition (H)  Offer meals /snacks 4-5 times a day      Please call or return to clinic if your symptoms don't go away.    Follow up plan  Please make a clinic appointment for follow up with me (REUBEN MIXON) in one  month for weight check..    Thank you for coming to [facility] today.  Lab Testing:  **If you had lab testing today and your results are reassuring or normal they will be mailed to you or sent through [employer] within 7 days.   **If the lab tests need quick action we will call you with the results.  The phone number we will call with results is # [phone] (home) . If this is not the best number please call our clinic and change the number.  Medication Refills:  If you need any refills please call your pharmacy and they will contact us.   If you need to  your refill at a new pharmacy, please contact the new pharmacy directly. The new pharmacy will help you get your medications transferred faster.   Scheduling:  If you have any concerns about today's visit or wish to schedule another appointment please call our office during normal business hours [phone] (8-5:00 M-F)  If a referral was made to a [facility] and you don't get a call from central scheduling please call [phone].  If a Mammogram was ordered for you  at [facility] call [phone] to schedule or change your appointment.  If you had an XRay/CT/Ultrasound/MRI ordered the number is [phone] to schedule or change your radiology appointment.   Medical Concerns:  If you have urgent medical concerns please call [phone] at any time of the day.    Reuben Mixon MD            Follow-ups after your visit        Who to contact     Please call your clinic at [phone] to:    Ask questions about your health    Make or cancel appointments    Discuss your medicines    Learn about your test results    Speak to your doctor            Additional Information About Your Visit        [employer] Information     [employer] is an electronic gateway that provides easy, online access to your medical records. With [employer], you can request a clinic appointment, read your test results, renew a prescription or communicate with your care team.     To sign up for [employer] visit the website at [email or web address]   You will be asked to enter the access code listed below, as well as some personal information. Please follow the directions to create your username and password.     Your access code is: [email or web address]  Expires: 2018  8:46 AM     Your access code will  in 90 days. If you need help or a new code, please contact your [facility] or call [phone] for assistance.        Care EveryWhere ID     This is your Care EveryWhere ID. This could be used by other organizations to access your [place] medical records  [id number]        Your Vitals Were     Pulse Temperature Last Period Pulse Oximetry Breastfeeding? BMI (Body Mass Index)    72 97.7  F (36.5  C) (Oral) 2018 96% No 17.36 kg/m2       Blood Pressure from Last 3 Encounters:   18 95/62   18 105/72   17 103/70    Weight from Last 3 Encounters:   18 88 lb 6.4 oz (40.1 kg)   18 89 lb (40.4 kg)   17 92 lb 9.6 oz (42 kg)               Today, you had the following     No orders found for display         Today's Medication Changes          These changes are accurate as of 3/16/18  8:46 AM.  If you have any questions, ask your nurse or doctor.               These medicines have changed or have updated prescriptions.        Dose/Directions    mirtazapine 45 MG tablet   Commonly known as:  REMERON   This may have changed:    - medication strength  - how much to take   Used for:  Adjustment disorder with depressed mood   Changed by:  Reuben Mixon MD        Dose:  45 mg   Take 1 tablet (45 mg) by mouth At Bedtime   Quantity:  31 tablet   Refills:  3            Where to get your medicines      These medications were sent to [facility] - [place], [place] - [address] [facility]  [address] [facility] [address], [facility] [address]     Phone:  [phone]     mirtazapine 45 MG tablet                Primary Care Provider Office Phone # Fax #    Reuben Mixon [phone]       [address] [address]        Equal Access to Services     [facility]: Hadii ranjith ku hadasho Soomaali, waaxda luqadaha, qaybta kaalmada adeegyada, waxay hermannin haydariana sorto . So [facility] [phone].    ATENCIÓN: Si habla español, tiene a causey disposición servicios gratuitos de asistencia lingüística. [facility] [phone].    We comply with applicable federal civil rights laws and [place] laws. We do not discriminate on the basis of race, color, national origin, age, disability, sex, sexual orientation, or gender identity.            Thank you!     Thank you for choosing [facility]  for your care. Our goal is always to provide you with excellent care. Hearing back from our patients is one way we can continue to improve our services. Please take a few minutes to complete the written survey that you may receive in the mail after your visit with us. Thank you!             Your Updated Medication List -  Protect others around you: Learn how to safely use, store and throw away your medicines at www.disposemymeds.org.          This list is accurate as of 3/16/18  8:46 AM.  Always use your most recent med list.                   Brand Name Dispense Instructions for use Diagnosis    Calcium Carbonate Antacid 1177 MG Chew     84 tablet    Take 1 chew tab by mouth 2 times daily as needed    Gastroesophageal reflux disease, esophagitis presence not specified       fluticasone 50 MCG/ACT spray    FLONASE    16 g    Spray 1-2 sprays into both nostrils daily    Chronic allergic rhinitis due to pollen, unspecified seasonality       ibuprofen 400 MG tablet    ADVIL/MOTRIN    120 tablet    Take 1 tablet (400 mg) by mouth every 6 hours as needed for moderate pain    Rib pain on left side       loratadine 10 MG tablet    CLARITIN    90 tablet    Take 1 tablet (10 mg) by mouth daily    Chronic rhinitis, unspecified type       mirtazapine 45 MG tablet    REMERON    31 tablet    Take 1 tablet (45 mg) by mouth At Bedtime    Adjustment disorder with depressed mood       multivitamin, therapeutic with minerals Tabs tablet     100 tablet    Take 1 tablet by mouth daily    Cachexia (H)       PEDIASURE Liqd     90 each    Take 1 Can by mouth 3 times daily    Cachexia (H)       polyethylene glycol powder    MIRALAX    510 g    Take 17 g (1 capful) by mouth daily    Slow transit constipation       ranitidine 150 MG tablet    ZANTAC    60 tablet    Take 1 tablet (150 mg) by mouth 2 times daily    Gastroesophageal reflux disease, esophagitis presence not specified

## 2018-03-16 NOTE — PROGRESS NOTES
HPI:       Rupinder Spaulding is a 23 year old who presents for the following  Patient presents with:  RECHECK: f/u chest pain. Pt stated that symptoms are getting better   RECHECK: weight/malnutrition        Concern: f/u malnutrition   Description of the problem :Pt's mother is concerned about pt losing weight and still not able to gain.  Taking her medication regularly.  Mother reports that she has run out of Ensure and is getting more today. She reports that her daughter continues to be very picky about her food.         Concern: f/u chest pain   Description of the problem :Pt and mother stated that since pt's last visit, her chest pain symptoms are better.        Problem, Medication and Allergy Lists were reviewed and are current.  Patient is an established patient of this clinic.         Review of Systems:   Review of Systems   Constitutional: Negative for activity change, fatigue and fever.   HENT: Negative for ear pain, sinus pressure and trouble swallowing.         See HPI   Eyes: Negative for photophobia, discharge and redness.   Respiratory: Negative for cough, chest tightness, shortness of breath and wheezing.    Musculoskeletal: Negative for myalgias and neck stiffness.   Allergic/Immunologic: Negative for environmental allergies.   Neurological: Negative for weakness and headaches.   Hematological: Negative for adenopathy.   Psychiatric/Behavioral: Negative for dysphoric mood.             Physical Exam:     Patient Vitals for the past 24 hrs:   BP Temp Temp src Pulse SpO2 Weight   03/16/18 0820 95/62 97.7  F (36.5  C) Oral 72 96 % 88 lb 6.4 oz (40.1 kg)     There is no height or weight on file to calculate BMI.  Vitals were reviewed and were normal     Physical Exam   Constitutional: She is oriented to person, place, and time. She appears well-developed and well-nourished. No distress.   Neurological: She is alert and oriented to person, place, and time.   Psychiatric: She has a normal mood and affect.  Her speech is normal and behavior is normal. Judgment and thought content normal. Cognition and memory are normal.   MENTAL STATUS EXAM  Appearance: appropriate  Attitude: answers with head movements  Behavior: withdrawn   Eye Contact: minimal  Speech: nonverbal  Orientation: unable to assess  Mood: appears more depressed than usual  Affect: Mood Congruent  Thought Process: unable to assess  Hallucination: no     Nursing note and vitals reviewed.        Assessment and Plan       1. Adjustment disorder with depressed mood  Will increase the medication as below  - mirtazapine (REMERON) 45 MG tablet; Take 1 tablet (45 mg) by mouth At Bedtime  Dispense: 31 tablet; Refill: 3    2. Moderate malnutrition (H)  Offer meals /snacks 4-5 times a day      Please call or return to clinic if your symptoms don't go away.    Follow up plan  Please make a clinic appointment for follow up with me (REUBEN MIXON) in one  month for weight check..      There are no discontinued medications.  Options for treatment and follow-up care were reviewed with the patient. Rupinder Spaulding  engaged in the decision making process and verbalized understanding of the options discussed and agreed with the final plan.    Reuben Mixon MD

## 2018-03-16 NOTE — PATIENT INSTRUCTIONS
Here is the plan from today's visit    1. Adjustment disorder with depressed mood  Will increase the medication as below  - mirtazapine (REMERON) 45 MG tablet; Take 1 tablet (45 mg) by mouth At Bedtime  Dispense: 31 tablet; Refill: 3    2. Moderate malnutrition (H)  Offer meals /snacks 4-5 times a day      Please call or return to clinic if your symptoms don't go away.    Follow up plan  Please make a clinic appointment for follow up with me (REUBEN MIXON) in one  month for weight check..    Thank you for coming to [facility] today.  Lab Testing:  **If you had lab testing today and your results are reassuring or normal they will be mailed to you or sent through [employer]abase within 7 days.   **If the lab tests need quick action we will call you with the results.  The phone number we will call with results is # [phone] (home) . If this is not the best number please call our clinic and change the number.  Medication Refills:  If you need any refills please call your pharmacy and they will contact us.   If you need to  your refill at a new pharmacy, please contact the new pharmacy directly. The new pharmacy will help you get your medications transferred faster.   Scheduling:  If you have any concerns about today's visit or wish to schedule another appointment please call our office during normal business hours [phone] (8-5:00 M-F)  If a referral was made to a [facility] and you don't get a call from central scheduling please call [phone].  If a Mammogram was ordered for you at [facility] call [phone] to schedule or change your appointment.  If you had an XRay/CT/Ultrasound/MRI ordered the number is [phone] to schedule or change your radiology appointment.   Medical Concerns:  If you have urgent medical concerns please call [phone] at any time of the day.    Reuben Mixon MD

## 2018-04-23 ENCOUNTER — OFFICE VISIT (OUTPATIENT)
Dept: FAMILY MEDICINE | Facility: CLINIC | Age: 23
End: 2018-04-23
Payer: COMMERCIAL

## 2018-04-23 VITALS
OXYGEN SATURATION: 99 % | SYSTOLIC BLOOD PRESSURE: 99 MMHG | WEIGHT: 95.4 LBS | TEMPERATURE: 98 F | DIASTOLIC BLOOD PRESSURE: 66 MMHG | BODY MASS INDEX: 18.73 KG/M2 | HEART RATE: 102 BPM

## 2018-04-23 DIAGNOSIS — K21.9 GASTROESOPHAGEAL REFLUX DISEASE, ESOPHAGITIS PRESENCE NOT SPECIFIED: ICD-10-CM

## 2018-04-23 DIAGNOSIS — F43.21 ADJUSTMENT DISORDER WITH DEPRESSED MOOD: ICD-10-CM

## 2018-04-23 RX ORDER — MIRTAZAPINE 45 MG/1
45 TABLET, FILM COATED ORAL AT BEDTIME
Qty: 31 TABLET | Refills: 3 | Status: SHIPPED | OUTPATIENT
Start: 2018-04-23 | End: 2018-07-03

## 2018-04-23 ASSESSMENT — ENCOUNTER SYMPTOMS
DIFFICULTY URINATING: 0
POLYDIPSIA: 0
DYSURIA: 0
NERVOUS/ANXIOUS: 0
FEVER: 0
COUGH: 0
CONSTIPATION: 0
FATIGUE: 0
MYALGIAS: 0
DIARRHEA: 0
ARTHRALGIAS: 0
BLOOD IN STOOL: 0
CHEST TIGHTNESS: 0
SHORTNESS OF BREATH: 0
ABDOMINAL DISTENTION: 0
PALPITATIONS: 0
NUMBNESS: 0
DYSPHORIC MOOD: 0
VOMITING: 0
SLEEP DISTURBANCE: 0
COLOR CHANGE: 0
ABDOMINAL PAIN: 0
EYES NEGATIVE: 1

## 2018-04-23 NOTE — MR AVS SNAPSHOT
After Visit Summary   4/23/2018    Rupinder Spaulding    MRN: [id number]           Patient Information     Date Of Birth          1995        Visit Information        Provider Department      4/23/2018 10:40 AM Reuben Mixon MD [facility]        Today's Diagnoses     Adjustment disorder with depressed mood        Gastroesophageal reflux disease, esophagitis presence not specified          Care Instructions    Here is the plan from today's visit    1. Adjustment disorder with depressed mood  Continue daily  - mirtazapine (REMERON) 45 MG tablet; Take 1 tablet (45 mg) by mouth At Bedtime  Dispense: 31 tablet; Refill: 3    2. Gastroesophageal reflux disease, esophagitis presence not specified  Continue as needed.  - ranitidine (ZANTAC) 150 MG tablet; Take 1 tablet (150 mg) by mouth 2 times daily  Dispense: 60 tablet; Refill: 3  Walking everyday.     Please call or return to clinic if your symptoms don't go away.    Follow up plan  Please make a clinic appointment for follow up with me (REUBEN MIXON) in 1-2 months .    Thank you for coming to [facility] today.  Lab Testing:  **If you had lab testing today and your results are reassuring or normal they will be mailed to you or sent through tastytrade within 7 days.   **If the lab tests need quick action we will call you with the results.  The phone number we will call with results is # [phone] (home) . If this is not the best number please call our clinic and change the number.  Medication Refills:  If you need any refills please call your pharmacy and they will contact us.   If you need to  your refill at a new pharmacy, please contact the new pharmacy directly. The new pharmacy will help you get your medications transferred faster.   Scheduling:  If you have any concerns about today's visit or wish to schedule another appointment please call our office during normal business hours [phone] (8-5:00 M-F)  If a referral  was made to a [facility] and you don't get a call from central scheduling please call [phone].  If a Mammogram was ordered for you at [facility] call [phone] to schedule or change your appointment.  If you had an XRay/CT/Ultrasound/MRI ordered the number is [phone] to schedule or change your radiology appointment.   Medical Concerns:  If you have urgent medical concerns please call [phone] at any time of the day.    Reuben Mixon MD            Follow-ups after your visit        Who to contact     Please call your clinic at [phone] to:    Ask questions about your health    Make or cancel appointments    Discuss your medicines    Learn about your test results    Speak to your doctor            Additional Information About Your Visit        [employer] Information     [facility] is an electronic gateway that provides easy, online access to your medical records. With [facility], you can request a clinic appointment, read your test results, renew a prescription or communicate with your care team.     To sign up for [facility] visit the website at [email or web address]   You will be asked to enter the access code listed below, as well as some personal information. Please follow the directions to create your username and password.     Your access code is: [email or web address]  Expires: 2018  8:46 AM     Your access code will  in 90 days. If you need help or a new code, please contact your [facility] or call [phone] for assistance.        Care EveryWhere ID     This is your Care EveryWhere ID. This could be used by other organizations to access your [facility] medical records  [id number]        Your Vitals Were     Pulse Temperature Last Period Pulse Oximetry Breastfeeding? BMI (Body Mass Index)    102 98  F (36.7  C) (Oral) 2018 99% No 18.73 kg/m2       Blood Pressure from Last 3 Encounters:   18 99/66   18 95/62    02/09/18 105/72    Weight from Last 3 Encounters:   04/23/18 95 lb 6.4 oz (43.3 kg)   03/16/18 88 lb 6.4 oz (40.1 kg)   02/09/18 89 lb (40.4 kg)              Today, you had the following     No orders found for display         Where to get your medicines      These medications were sent to [facility] - [place], [place] - [address]  [address], [facility] [address]     Phone:  [phone]     mirtazapine 45 MG tablet    ranitidine 150 MG tablet          Primary Care Provider Office Phone # Fax #    Reuben Mixon [phone]       [address] [facility] [address]  [facility] [address]        Equal Access to Services     GITA WEBB : Scarlet belloo Soshawn, waaxda luqadaha, qaybta kaalmada adeegyaollie, austin hercules. So [facility] [phone].    ATENCIÓN: Si habla español, tiene a causey disposición servicios gratuitos de asistencia lingüística. Llame al [phone].    We comply with applicable federal civil rights laws and [place] laws. We do not discriminate on the basis of race, color, national origin, age, disability, sex, sexual orientation, or gender identity.            Thank you!     Thank you for choosing [facility]  for your care. Our goal is always to provide you with excellent care. Hearing back from our patients is one way we can continue to improve our services. Please take a few minutes to complete the written survey that you may receive in the mail after your visit with us. Thank you!             Your Updated Medication List - Protect others around you: Learn how to safely use, store and throw away your medicines at www.disposemymeds.org.          This list is accurate as of 4/23/18 10:57 AM.  Always use your most recent med list.                   Brand Name Dispense Instructions for use Diagnosis    Calcium Carbonate Antacid 1177 MG Chew     84 tablet    Take 1 chew tab by mouth 2 times daily as needed     Gastroesophageal reflux disease, esophagitis presence not specified       fluticasone 50 MCG/ACT spray    FLONASE    16 g    Spray 1-2 sprays into both nostrils daily    Chronic allergic rhinitis due to pollen, unspecified seasonality       ibuprofen 400 MG tablet    ADVIL/MOTRIN    120 tablet    Take 1 tablet (400 mg) by mouth every 6 hours as needed for moderate pain    Rib pain on left side       loratadine 10 MG tablet    CLARITIN    90 tablet    Take 1 tablet (10 mg) by mouth daily    Chronic rhinitis, unspecified type       mirtazapine 45 MG tablet    REMERON    31 tablet    Take 1 tablet (45 mg) by mouth At Bedtime    Adjustment disorder with depressed mood       multivitamin, therapeutic with minerals Tabs tablet     100 tablet    Take 1 tablet by mouth daily    Cachexia (H)       PEDIASURE Liqd     90 each    Take 1 Can by mouth 3 times daily    Cachexia (H)       polyethylene glycol powder    MIRALAX    510 g    Take 17 g (1 capful) by mouth daily    Slow transit constipation       ranitidine 150 MG tablet    ZANTAC    60 tablet    Take 1 tablet (150 mg) by mouth 2 times daily    Gastroesophageal reflux disease, esophagitis presence not specified

## 2018-04-23 NOTE — PROGRESS NOTES
HPI       Rupinder Spaulding is a 23 year old  who presents for   Chief Complaint   Patient presents with     RECHECK     Weight. Mother also stated that pt's appetite has gotten better since previous visit     Cachexia  Is feeling better, more positive -excited about going to [place] , is not in a group-waiting to hear back.      Adjustment disorder   Feeling much better mood wise, enjoying food -proud she has gained 10 lbs, Is continuing to to take mirtazapine. Not doing much exercise though is plkanning on starting now that weather is improved.    A [employer]  was used for  this visit.      Problem, Medication and Allergy Lists were reviewed and updated if needed..    Patient is an established patient of this clinic..         Review of Systems:   Review of Systems   Constitutional: Negative for fatigue and fever.   HENT: Negative.    Eyes: Negative.  Negative for visual disturbance.   Respiratory: Negative for cough, chest tightness and shortness of breath.    Cardiovascular: Negative for chest pain and palpitations.   Gastrointestinal: Negative for abdominal distention, abdominal pain, blood in stool, constipation, diarrhea and vomiting.   Endocrine: Negative for polydipsia and polyuria.   Genitourinary: Negative for difficulty urinating and dysuria.   Musculoskeletal: Negative for arthralgias and myalgias.   Skin: Negative for color change and rash.   Neurological: Negative for numbness.   Psychiatric/Behavioral: Negative for dysphoric mood and sleep disturbance. The patient is not nervous/anxious.             Physical Exam:     Vitals:    04/23/18 1028   BP: 99/66   Pulse: 102   Temp: 98  F (36.7  C)   TempSrc: Oral   SpO2: 99%   Weight: 95 lb 6.4 oz (43.3 kg)     Body mass index is 18.73 kg/(m^2).  Vitals were reviewed and were normal     Physical Exam   Constitutional: She is oriented to person, place, and time. She appears well-developed. No distress.   HENT:   Head: Normocephalic.   Eyes:  Conjunctivae are normal. No scleral icterus.   Neck: Normal range of motion. No thyromegaly present.   Cardiovascular: Normal rate, regular rhythm, normal heart sounds and intact distal pulses.    No murmur heard.  Pulmonary/Chest: Effort normal and breath sounds normal. No respiratory distress. She has no wheezes.   Abdominal: Soft. Bowel sounds are normal. She exhibits no distension. There is no splenomegaly or hepatomegaly. There is no tenderness.   Musculoskeletal: She exhibits no edema.   Lymphadenopathy:     She has no cervical adenopathy.   Neurological: She is alert and oriented to person, place, and time.   Skin: Skin is warm and dry. She is not diaphoretic.   Psychiatric: She has a normal mood and affect. Her behavior is normal. Judgment and thought content normal.   Vitals reviewed.        Results:     No testing ordered today    Assessment and Plan        1. Adjustment disorder with depressed mood  Continue daily, appears to be well controlled now.   - mirtazapine (REMERON) 45 MG tablet; Take 1 tablet (45 mg) by mouth At Bedtime  Dispense: 31 tablet; Refill: 3    2. Gastroesophageal reflux disease, esophagitis presence not specified  Controlled Continue as needed.  - ranitidine (ZANTAC) 150 MG tablet; Take 1 tablet (150 mg) by mouth 2 times daily  Dispense: 60 tablet; Refill: 3  Walking everyday.     Please call or return to clinic if your symptoms don't go away.    Follow up plan  Please make a clinic appointment for follow up with me (REUBEN MIXON) in 1-2 months .       There are no discontinued medications.    Options for treatment and follow-up care were reviewed with the patient. Rupinder Spaulding  engaged in the decision making process and verbalized understanding of the options discussed and agreed with the final plan.    Reuben Mixon MD

## 2018-04-23 NOTE — PATIENT INSTRUCTIONS
Here is the plan from today's visit    1. Adjustment disorder with depressed mood  Continue daily  - mirtazapine (REMERON) 45 MG tablet; Take 1 tablet (45 mg) by mouth At Bedtime  Dispense: 31 tablet; Refill: 3    2. Gastroesophageal reflux disease, esophagitis presence not specified  Continue as needed.  - ranitidine (ZANTAC) 150 MG tablet; Take 1 tablet (150 mg) by mouth 2 times daily  Dispense: 60 tablet; Refill: 3  Walking everyday.     Please call or return to clinic if your symptoms don't go away.    Follow up plan  Please make a clinic appointment for follow up with me (JOES STAHL) in 1-2 months .    Thank you for coming to [facility] today.  Lab Testing:  **If you had lab testing today and your results are reassuring or normal they will be mailed to you or sent through [employer] within 7 days.   **If the lab tests need quick action we will call you with the results.  The phone number we will call with results is # [phone] (home) . If this is not the best number please call our clinic and change the number.  Medication Refills:  If you need any refills please call your pharmacy and they will contact us.   If you need to  your refill at a new pharmacy, please contact the new pharmacy directly. The new pharmacy will help you get your medications transferred faster.   Scheduling:  If you have any concerns about today's visit or wish to schedule another appointment please call our office during normal business hours [phone] (8-5:00 M-F)  If a referral was made to a [facility] and you don't get a call from central scheduling please call [phone].  If a Mammogram was ordered for you at [facility] call [phone] to schedule or change your appointment.  If you had an XRay/CT/Ultrasound/MRI ordered the number is [phone] to schedule or change your radiology appointment.   Medical Concerns:  If you have urgent medical concerns please call [phone] at  any time of the day.    Reuben Mixon MD

## 2018-04-26 ENCOUNTER — OFFICE VISIT (OUTPATIENT)
Dept: FAMILY MEDICINE | Facility: CLINIC | Age: 23
End: 2018-04-26
Payer: COMMERCIAL

## 2018-04-26 VITALS
HEART RATE: 98 BPM | TEMPERATURE: 98.2 F | WEIGHT: 91.6 LBS | SYSTOLIC BLOOD PRESSURE: 105 MMHG | DIASTOLIC BLOOD PRESSURE: 73 MMHG | BODY MASS INDEX: 17.98 KG/M2 | OXYGEN SATURATION: 98 %

## 2018-04-26 DIAGNOSIS — Z32.01 PREGNANCY TEST POSITIVE: Primary | ICD-10-CM

## 2018-04-26 DIAGNOSIS — Z32.00 PREGNANCY EXAMINATION OR TEST, PREGNANCY UNCONFIRMED: ICD-10-CM

## 2018-04-26 LAB — HCG UR QL: POSITIVE

## 2018-04-26 NOTE — PATIENT INSTRUCTIONS
[facility] Offering  Services   Clinic     Gestation  Days Open   [facility]   4-23 weeks  Mon-Sat   [address] [place] [address]   [phone]       - Some insurance plans cover the cost of  services   - Fees vary with each clinic and the stage of pregnancy.   - At each of the above clinics, you will visit with a nurse or counselor before seeing the physician for the  procedure. They will review your health history, explain the procedure, aftercare, and the  consent form, and discuss any questions or concerns you may have about your decision, the procedure, and future contraception.   - A loan fund for women in need of financial services is available through [employer]. If you are eligible to receive money you will be expected to repay the loan within a reasonable amount of time. Financial Assistance Phone: [phone]     Pregnancy can be accompanied by a range of feelings for a woman as well as her partner. There are community organizations where a woman or couple can talk about this and get information, counseling, and support. It usually involves one or more sessions with a trained counselor who recognizes the complexity of a situation and encourages your own decisions.     For questions about community resources in terms of your pregnancy you may call [facility] for a pregnancy counseling referral [phone]     Pregnancy and adoption information can be found at the following:   [employer]   [employer]    [facility]   [address]   [address]   [address]  Phone: [phone]   Phone: [phone]   Phone: [phone]

## 2018-04-26 NOTE — PROGRESS NOTES
"      HPI:       Rupinder Spaulding is a 23 year old who presents accompanied by her mother for the following  Patient presents with:  Pregnancy Test: Complaining of abdominal discomfort x2 mths and missed menses. Pt would like a UPT done today.    Missed menses  Patient only interview:  When asked if she has had sex, patient replies \"yes\". When asked additional questions (when, was it consentual, is she safe) she does not answer questions. When asked if she bled in January, patient says \"no\". When asked if she bled in December, pt does not respond.     Per mother:  her LMP was January according to the sister who helps clean her. Has had intermittent menses in the past (q 1-3 months). Mother was worried that she could be pregnancy because of suspicious calls. Mother answered her phone once and it was a man named Cooper looking for Rupinder. There is a period of time when mother is not with Rupinder during the day and she is left alone with her older and younger sisters. At night she sleeps in a bedroom with her sisters.     Abdominal pain  Per mother:  \"burning\" sensation and nausea fort he past 6 weeks. More burning than the normal gastric reflux pain. Appetite has increased. Increased fatigue. Denies fevers or weight loss    A [employer]  was used for  this visit.  Mother answers questions. Patient also interviewed separately from mother, patient ok with medical information being discussed with mother.     Problem, Medication and Allergy Lists were reviewed and are current.  Patient is an established patient of this clinic.         Review of Systems:   Review of Systems          Physical Exam:   Patient Vitals for the past 24 hrs:   BP Temp Temp src Pulse SpO2 Weight   04/26/18 1300 105/73 98.2  F (36.8  C) Oral 98 98 % 91 lb 9.6 oz (41.5 kg)     Body mass index is 17.98 kg/(m^2).     Physical Exam   Constitutional: She is active.   underweight   Abdominal:   Fundal height is ~3cm above the umbilicus and measures 19cm " "from the pubic bone.    Neurological: She is alert.   Bedside ultrasound: fetal movement and cardiac activity. Approximate BPD is 5.24cm      Results:      Results from the last 24 hours  Results for orders placed or performed in visit on 04/26/18 (from the past 24 hour(s))   HCG Qualitative Urine (UPT) ([facility])   Result Value Ref Range    HCG Qual Urine POSITIVE Negative     Assessment and Plan     22yo F with diagnoses of mental deficiency and autism presents with her mother and a professional [facility] interpretor due to concern for pregnancy. I told Rupinder her positive pregnancy test results with only the [facility] interpretor present. She replied right away that she wanted \"it out\". I explained her options of continuing the pregnancy and parenting, continuing the pregnancy and not parenting, and ending the pregnancy now.  She told me she wanted to end the pregnancy now.  Not getting a consistent story for LMP, mother says January, patient says before January but will not confirm further, if LMP was January then GA would be 16 weeks. Based on her abdominal exam with fundal height several centimeters above the umbilicus I was a was concerned about her being at too high a gestation to get a D&E.  Bedside ultrasound confirmed cardiac activity, fetal movement, and BPD correlated with a gestational age of 21 weeks and 3 days. Patient expressed that she was okay with me sharing details with her mother. Patient's mother was brought back into the room, the plan was discussed with the mother who is in support of the plan to proceed with termination of pregnancy. The patient and family were given contact information and referral to [facility] in [place].  [facility] clinic  [facility] was contacted to help expedite the process given her potential high gestational age    Options for treatment and follow-up care were reviewed with the patient. Rupinder Spaulding  engaged in the decision making process and " verbalized understanding of the options discussed and agreed with the final plan.    I spent 25 min face to face with the patient and >50% was spent counselling the patient about the above medical conditions, educating patient and discussing the recommendations and followup .    Cha Hussein MD   of [facility], [facility]

## 2018-04-26 NOTE — MR AVS SNAPSHOT
After Visit Summary   2018    Rupinder Spaulding    MRN: [id number]           Patient Information     Date Of Birth          1995        Visit Information        Provider Department      2018 1:00 PM Cha Hussein MD [facility]        Today's Diagnoses     Pregnancy examination or test, pregnancy unconfirmed    -  1      Care Instructions     [facility] Offering  Services   Clinic     Gestation  Days Open   [facility]   4-23 weeks  Mon-Sat   [address] [place] [address]   [phone]       - Some insurance plans cover the cost of  services   - Fees vary with each clinic and the stage of pregnancy.   - At each of the above clinics, you will visit with a nurse or counselor before seeing the physician for the  procedure. They will review your health history, explain the procedure, aftercare, and the  consent form, and discuss any questions or concerns you may have about your decision, the procedure, and future contraception.   - A loan fund for women in need of financial services is available through [employer]. If you are eligible to receive money you will be expected to repay the loan within a reasonable amount of time. Financial Assistance Phone: [phone]     Pregnancy can be accompanied by a range of feelings for a woman as well as her partner. There are community organizations where a woman or couple can talk about this and get information, counseling, and support. It usually involves one or more sessions with a trained counselor who recognizes the complexity of a situation and encourages your own decisions.     For questions about community resources in terms of your pregnancy you may call [facility] for a pregnancy counseling referral [phone]     Pregnancy and adoption information can be found at the following:   [facility]   [facility]     [facility]   [address] [place]   [address] [facility]   [address] [facility]  Phone: [phone]   Phone: [phone]   Phone: [phone]             Follow-ups after your visit        Who to contact     Please call your clinic at [phone] to:    Ask questions about your health    Make or cancel appointments    Discuss your medicines    Learn about your test results    Speak to your doctor            Additional Information About Your Visit        [employer] Information     muzu tv is an electronic gateway that provides easy, online access to your medical records. With muzu tv, you can request a clinic appointment, read your test results, renew a prescription or communicate with your care team.     To sign up for muzu tv visit the website at [email or web address]   You will be asked to enter the access code listed below, as well as some personal information. Please follow the directions to create your username and password.     Your access code is: [email or web address]  Expires: 2018  8:46 AM     Your access code will  in 90 days. If you need help or a new code, please contact your [facility] or call [phone] for assistance.        Care EveryWhere ID     This is your Care EveryWhere ID. This could be used by other organizations to access your [place] medical records  [id number]        Your Vitals Were     Pulse Temperature Last Period Pulse Oximetry BMI (Body Mass Index)       98 98.2  F (36.8  C) (Oral) 2018 98% 17.98 kg/m2        Blood Pressure from Last 3 Encounters:   18 105/73   18 99/66   18 95/62    Weight from Last 3 Encounters:   18 91 lb 9.6 oz (41.5 kg)   18 95 lb 6.4 oz (43.3 kg)   18 88 lb 6.4 oz (40.1 kg)              We Performed the Following     HCG Qualitative Urine (UPT) ([facility])        Primary Care Provider Office Phone # Fax #    Reuben Mixon MD  [phone] [address]  [facility] [address]        Equal Access to Services     GITA WEBB : Hadii aad ku hadaida Moody, patrica freddyjewel, cabrera swift, austin denton laKendradariana delisa. [facility] [phone].    ATENCIÓN: Si habla español, tiene a causey disposición servicios gratuitos de asistencia lingüística. Jorge al [phone].    We comply with applicable federal civil rights laws and [place] laws. We do not discriminate on the basis of race, color, national origin, age, disability, sex, sexual orientation, or gender identity.            Thank you!     Thank you for choosing [facility]  for your care. Our goal is always to provide you with excellent care. Hearing back from our patients is one way we can continue to improve our services. Please take a few minutes to complete the written survey that you may receive in the mail after your visit with us. Thank you!             Your Updated Medication List - Protect others around you: Learn how to safely use, store and throw away your medicines at www.disposemymeds.org.          This list is accurate as of 4/26/18  1:46 PM.  Always use your most recent med list.                   Brand Name Dispense Instructions for use Diagnosis    Calcium Carbonate Antacid 1177 MG Chew     84 tablet    Take 1 chew tab by mouth 2 times daily as needed    Gastroesophageal reflux disease, esophagitis presence not specified       fluticasone 50 MCG/ACT spray    FLONASE    16 g    Spray 1-2 sprays into both nostrils daily    Chronic allergic rhinitis due to pollen, unspecified seasonality       ibuprofen 400 MG tablet    ADVIL/MOTRIN    120 tablet    Take 1 tablet (400 mg) by mouth every 6 hours as needed for moderate pain    Rib pain on left side       loratadine 10 MG tablet    CLARITIN    90 tablet    Take 1 tablet (10 mg) by mouth daily    Chronic rhinitis, unspecified type       mirtazapine 45 MG  tablet    REMERON    31 tablet    Take 1 tablet (45 mg) by mouth At Bedtime    Adjustment disorder with depressed mood       multivitamin, therapeutic with minerals Tabs tablet     100 tablet    Take 1 tablet by mouth daily    Cachexia (H)       PEDIASURE Liqd     90 each    Take 1 Can by mouth 3 times daily    Cachexia (H)       polyethylene glycol powder    MIRALAX    510 g    Take 17 g (1 capful) by mouth daily    Slow transit constipation       ranitidine 150 MG tablet    ZANTAC    60 tablet    Take 1 tablet (150 mg) by mouth 2 times daily    Gastroesophageal reflux disease, esophagitis presence not specified

## 2018-06-04 ENCOUNTER — CARE COORDINATION (OUTPATIENT)
Dept: PSYCHOLOGY | Facility: CLINIC | Age: 23
End: 2018-06-04
Payer: COMMERCIAL

## 2018-06-04 ENCOUNTER — OFFICE VISIT (OUTPATIENT)
Dept: FAMILY MEDICINE | Facility: CLINIC | Age: 23
End: 2018-06-04
Payer: COMMERCIAL

## 2018-06-04 VITALS
BODY MASS INDEX: 16.45 KG/M2 | SYSTOLIC BLOOD PRESSURE: 106 MMHG | WEIGHT: 83.8 LBS | HEART RATE: 81 BPM | TEMPERATURE: 97.7 F | DIASTOLIC BLOOD PRESSURE: 76 MMHG | OXYGEN SATURATION: 100 %

## 2018-06-04 DIAGNOSIS — R53.83 FATIGUE, UNSPECIFIED TYPE: Primary | ICD-10-CM

## 2018-06-04 LAB
HCT VFR BLD AUTO: 46.4 % (ref 35–47)
HEMOGLOBIN: 14.6 G/DL (ref 11.7–15.7)
MCH RBC QN AUTO: 29.7 PG (ref 26.5–35)
MCHC RBC AUTO-ENTMCNC: 31.5 G/DL (ref 32–36)
MCV RBC AUTO: 94.6 FL (ref 78–100)
PLATELET # BLD AUTO: 303 K/UL (ref 150–450)
RBC # BLD AUTO: 4.91 M/UL (ref 3.8–5.2)
WBC # BLD AUTO: 4.7 K/UL (ref 4–11)

## 2018-06-04 NOTE — LETTER
June 7, 2018      Rupinder Spaulding  [address]  [facility] [address]        Dear Rupinder,    Thank you for getting your care at [facility]. Please see below for your test results.    Resulted Orders   CBC with Plt ([facility])   Result Value Ref Range    WBC 4.7 4.0 - 11.0 K/uL    RBC 4.91 3.80 - 5.20 M/uL    Hemoglobin 14.6 11.7 - 15.7 g/dL    Hematocrit 46.4 35.0 - 47.0 %    MCV 94.6 78.0 - 100.0 fL    MCH 29.7 26.5 - 35.0 pg    MCHC 31.5 (L) 32.0 - 36.0 g/dL    Platelets 303.0 150.0 - 450.0 K/uL       Your results are reassuring.If you have questions please contact the clinic to make an appointment with  me or your primary doctor to discuss the results. Central African translation:segundo mars gu kameron jawaabtaadii brent goodwinqtamelissa fritz  braxtono aide rose jawaabtaada.          Sincerely,    Cha Hussein MD

## 2018-06-04 NOTE — NURSING NOTE
Due to patient being non-English speaking/uses sign language, an  was used for this visit. Only for face-to-face interpretation by an external agency, date and length of interpretation can be found on the scanned worksheet.     name: Abdifatah Ocampo  Agency: [employer]  Language: Kittitian   Telephone number: [phone]  Type of interpretation: Face-to-face, spoken

## 2018-06-04 NOTE — MR AVS SNAPSHOT
After Visit Summary   6/4/2018    Rupinder Spaulding    MRN: [id number]           Patient Information     Date Of Birth          1995        Visit Information        Provider Department      6/4/2018 11:00 AM Cha Hussein MD [facility]        Today's Diagnoses     Fatigue, unspecified type    -  1      Care Instructions    Follow up with Dr. Hussein or Dr. Mixon in 1 week to discuss contraception and fatigue and mood            Follow-ups after your visit        Who to contact     Please call your clinic at [phone] to:    Ask questions about your health    Make or cancel appointments    Discuss your medicines    Learn about your test results    Speak to your doctor            Additional Information About Your Visit        Care EveryWhere ID     This is your Care EveryWhere ID. This could be used by other organizations to access your [place] medical records  [id number]        Your Vitals Were     Pulse Temperature Pulse Oximetry BMI (Body Mass Index)          81 97.7  F (36.5  C) (Oral) 100% 16.45 kg/m2         Blood Pressure from Last 3 Encounters:   06/04/18 106/76   04/26/18 105/73   04/23/18 99/66    Weight from Last 3 Encounters:   06/04/18 83 lb 12.8 oz (38 kg)   04/26/18 91 lb 9.6 oz (41.5 kg)   04/23/18 95 lb 6.4 oz (43.3 kg)              We Performed the Following     CBC with Plt ([facility])        Primary Care Provider Office Phone # Fax #    Reuben Mixon [phone]       [address] [address]        Equal Access to Services     [facility] AH: Hadii aad ku hadasho Soomaali, waaxda luqadaha, qaybta kaalmada adeegyada, waxay lupe haydariana sorto . So [facility] [phone].    ATENCIÓN: Si habla español, tiene a causey disposición servicios gratuitos de asistencia lingüística. Llfranco al [phone].    We comply with applicable federal civil rights laws and [place] laws. We do not discriminate on the basis  of race, color, national origin, age, disability, sex, sexual orientation, or gender identity.            Thank you!     Thank you for choosing [facility]  for your care. Our goal is always to provide you with excellent care. Hearing back from our patients is one way we can continue to improve our services. Please take a few minutes to complete the written survey that you may receive in the mail after your visit with us. Thank you!             Your Updated Medication List - Protect others around you: Learn how to safely use, store and throw away your medicines at www.disposemymeds.org.          This list is accurate as of 6/4/18 12:16 PM.  Always use your most recent med list.                   Brand Name Dispense Instructions for use Diagnosis    Calcium Carbonate Antacid 1177 MG Chew     84 tablet    Take 1 chew tab by mouth 2 times daily as needed    Gastroesophageal reflux disease, esophagitis presence not specified       fluticasone 50 MCG/ACT spray    FLONASE    16 g    Spray 1-2 sprays into both nostrils daily    Chronic allergic rhinitis due to pollen, unspecified seasonality       ibuprofen 400 MG tablet    ADVIL/MOTRIN    120 tablet    Take 1 tablet (400 mg) by mouth every 6 hours as needed for moderate pain    Rib pain on left side       loratadine 10 MG tablet    CLARITIN    90 tablet    Take 1 tablet (10 mg) by mouth daily    Chronic rhinitis, unspecified type       mirtazapine 45 MG tablet    REMERON    31 tablet    Take 1 tablet (45 mg) by mouth At Bedtime    Adjustment disorder with depressed mood       multivitamin, therapeutic with minerals Tabs tablet     100 tablet    Take 1 tablet by mouth daily    Cachexia (H)       PEDIASURE Liqd     90 each    Take 1 Can by mouth 3 times daily    Cachexia (H)       polyethylene glycol powder    MIRALAX    510 g    Take 17 g (1 capful) by mouth daily    Slow transit constipation       ranitidine 150 MG tablet    ZANTAC    60 tablet    Take  1 tablet (150 mg) by mouth 2 times daily    Gastroesophageal reflux disease, esophagitis presence not specified

## 2018-06-04 NOTE — PROGRESS NOTES
"      HPI:       Rupinder Spaulding is a 23 year old who presents for the following  Patient presents with:  RECHECK: poor appetite, losing weight, feeling weak x1 month    26 week  in [place] on 5/15/2018. Went smoothly, stayed for 4 days in town. Since  she is not eating, more depressed, doesn't want to get out of bed. Similar issue before pregnancy, but not this bad.  at [place] \"not doing what they're supposed to do\". Mother is very stressed because she has been missing work and school to take care of Rupinder. She needs help. Rupidner does not want to answer any questions, declines any exam.     Patient has severe autism and come cognitive delay. History obtained entirely from her mother.     A Intent  was used for  this visit.    Problem, Medication and Allergy Lists were reviewed and are current.  Patient is an established patient of this clinic.         Review of Systems:   Review of Systems          Physical Exam:   Patient Vitals for the past 24 hrs:   BP Temp Temp src Pulse SpO2 Weight   18 1110 106/76 97.7  F (36.5  C) Oral 81 100 % 83 lb 12.8 oz (38 kg)     Wt Readings from Last 5 Encounters:   18 83 lb 12.8 oz (38 kg)   18 91 lb 9.6 oz (41.5 kg)   18 95 lb 6.4 oz (43.3 kg)   18 88 lb 6.4 oz (40.1 kg)   18 89 lb (40.4 kg)     Body mass index is 16.45 kg/(m^2).     Physical Exam   Constitutional: She appears cachectic.   HENT:   Head: Normocephalic and atraumatic.   Eyes: Conjunctivae are normal.   Cardiovascular: Normal rate.    Pulmonary/Chest: Effort normal. No respiratory distress.   Skin: Skin is warm and dry. No rash noted. No erythema. No pallor.   Psychiatric: She is withdrawn.   Vitals reviewed.      Results:     Results for orders placed or performed in visit on 18   CBC with Plt ([facility])   Result Value Ref Range    WBC 4.7 4.0 - 11.0 K/uL    RBC 4.91 3.80 - 5.20 M/uL    Hemoglobin 14.6 11.7 - 15.7 g/dL    Hematocrit 46.4 " 35.0 - 47.0 %    MCV 94.6 78.0 - 100.0 fL    MCH 29.7 26.5 - 35.0 pg    MCHC 31.5 (L) 32.0 - 36.0 g/dL    Platelets 303.0 150.0 - 450.0 K/uL       Assessment and Plan     24yo woman with ASD and cognitive delay presenting 2 weeks after terminating a 26 week pregnancy with low activity levels and low appetite. CBC today indicates no anemia. Of note, she has struggled with this before, but mom says this is worse. We discussed that it is normal to have a period to grieve her pregnancy loss, even if it was the right decision. Rupinder and her mother would benefit from family therapy to create a safe space where they can process their emotions. We also discussed contraception. I reviewed all contraception options. Mother is skeptical of methods that induce amenorrhea, although in her case LARC would probably be the best option. Details of how she got pregnant are still unclear, consent is unclear, Rupinder does not provide answers when asked these questions. Mother states that she will talk with Rupinder about the therapy and contraception options and follow up in 1 week with myself or Dr. Mixon.     There are no discontinued medications.  Options for treatment and follow-up care were reviewed with the patient. Rupinder Spaulding  engaged in the decision making process and verbalized understanding of the options discussed and agreed with the final plan.    I spent 40 min face to face with the patient and >50% was spent counselling the patient about the above medical conditions, educating patient and discussing the recommendations and followup .    Cha Hussein MD  [facility], \[facility]\""

## 2018-06-04 NOTE — PROGRESS NOTES
"Due to patient being non-English speaking/uses sign language, an  was used for this visit. Only for face-to-face interpretation by an external agency, date and length of interpretation can be found on the scanned worksheet.     name: Abdifatah   Agency: [employer]  Language: Mosotho   Telephone number: [phone]  Type of interpretation: Face-to-face, spoken     Request from PCP to meet with patient, her mother and .  Patient and her mother were tearful upon arrival.  Mother, Brynn verbalized frustration with \"[facility]\", she stated that Rupinder has a  at [facility], however she had come out to see patient once and they have not heard back from them.  She stated she does not know what the  name is or her phone number.  She stated, \"They are not helping me.\"  She stated that patient requires 24/7 day care and that she is feeling exhausted.  She stated that she was promised \"a program...an apprenticeship\" and has not heard back.  She stated that she attempted to call the [facility] and was informed that their  is not longer employed with them and she was not given a replacement  name/number.  This writer offered to call [facility] and attempted to reach the case management, however was informed that their hours are 9-4 M-F.  I will attempt to reach them again tomorrow.  Informed patient and mother, that a referral was made for [facility] Beatriz Soto for ongoing follow up.  Silvana Carranza,[facility]        "

## 2018-06-05 ENCOUNTER — TELEPHONE (OUTPATIENT)
Dept: FAMILY MEDICINE | Facility: CLINIC | Age: 23
End: 2018-06-05

## 2018-06-05 ASSESSMENT — PATIENT HEALTH QUESTIONNAIRE - PHQ9: SUM OF ALL RESPONSES TO PHQ QUESTIONS 1-9: 12

## 2018-06-05 NOTE — TELEPHONE ENCOUNTER
Referral made with Beatriz Gamboa via Epic.  Phone call made to [facility], left a voice mail message with Krystal Guidry with request to follow up with patient regarding case management services.  ELIZABETH Xiong [facility]

## 2018-07-03 ENCOUNTER — OFFICE VISIT (OUTPATIENT)
Dept: FAMILY MEDICINE | Facility: CLINIC | Age: 23
End: 2018-07-03
Payer: COMMERCIAL

## 2018-07-03 VITALS
WEIGHT: 83.4 LBS | HEART RATE: 69 BPM | OXYGEN SATURATION: 99 % | DIASTOLIC BLOOD PRESSURE: 75 MMHG | RESPIRATION RATE: 16 BRPM | SYSTOLIC BLOOD PRESSURE: 108 MMHG | BODY MASS INDEX: 16.37 KG/M2 | TEMPERATURE: 97.8 F

## 2018-07-03 DIAGNOSIS — K21.9 GASTROESOPHAGEAL REFLUX DISEASE, ESOPHAGITIS PRESENCE NOT SPECIFIED: ICD-10-CM

## 2018-07-03 DIAGNOSIS — J30.1 CHRONIC ALLERGIC RHINITIS DUE TO POLLEN, UNSPECIFIED SEASONALITY: ICD-10-CM

## 2018-07-03 DIAGNOSIS — R64 CACHEXIA (H): ICD-10-CM

## 2018-07-03 DIAGNOSIS — Z00.00 HEALTHCARE MAINTENANCE: Primary | ICD-10-CM

## 2018-07-03 DIAGNOSIS — F43.21 ADJUSTMENT DISORDER WITH DEPRESSED MOOD: ICD-10-CM

## 2018-07-03 DIAGNOSIS — K59.01 SLOW TRANSIT CONSTIPATION: ICD-10-CM

## 2018-07-03 LAB
HBV SURFACE AB SERPL IA-ACNC: 381.53 M[IU]/ML
HBV SURFACE AG SERPL QL IA: NONREACTIVE
HCV AB SERPL QL IA: NONREACTIVE
HIV 1+2 AB+HIV1 P24 AG SERPL QL IA: NONREACTIVE
T PALLIDUM AB SER QL: NONREACTIVE

## 2018-07-03 RX ORDER — MIRTAZAPINE 45 MG/1
45 TABLET, FILM COATED ORAL AT BEDTIME
Qty: 31 TABLET | Refills: 11 | Status: SHIPPED | OUTPATIENT
Start: 2018-07-03 | End: 2018-08-03

## 2018-07-03 RX ORDER — POLYETHYLENE GLYCOL 3350 17 G/17G
1 POWDER, FOR SOLUTION ORAL DAILY
Qty: 510 G | Refills: 11 | Status: SHIPPED | OUTPATIENT
Start: 2018-07-03 | End: 2018-12-18

## 2018-07-03 RX ORDER — FLUTICASONE PROPIONATE 50 MCG
1-2 SPRAY, SUSPENSION (ML) NASAL DAILY
Qty: 16 G | Refills: 3 | Status: SHIPPED | OUTPATIENT
Start: 2018-07-03 | End: 2018-12-18

## 2018-07-03 RX ORDER — IBUPROFEN 400 MG/1
400 TABLET, FILM COATED ORAL EVERY 6 HOURS PRN
Qty: 120 TABLET | Refills: 3 | Status: SHIPPED | OUTPATIENT
Start: 2018-07-03 | End: 2018-08-03

## 2018-07-03 RX ORDER — MULTIPLE VITAMINS W/ MINERALS TAB 9MG-400MCG
1 TAB ORAL DAILY
Qty: 100 TABLET | Refills: 3 | Status: SHIPPED | OUTPATIENT
Start: 2018-07-03 | End: 2018-08-03

## 2018-07-03 NOTE — PROGRESS NOTES
"      HPI:       Rupinder Spaulding is a 23 year old who presents for the following  Patient presents with:  Abdominal Pain: constipation because patient isn't eating much, needs to be checked after her  on 5/15/18  Fever    Per mother:   Patient is in high spirits today, asked for the appointment.   Appetite is improved. No weight loss.   Mom wants her to be \"checked out after the \"  Regarding home supports, [employer] SW connected with mom with people from the county, made 2 appointments but the other people did not show up. Needs all meds refilled.     Patient interviewed independently/privately:  She is currently menstruating, her periods have returned to normal.   She declines a pelvic exam today to obtain a pap smear  She declines the HPV shot  She does not desire to start any form of contraception. She states she will \"be careful.\" She reports knowing the man who she became pregnant with, he did not know she was pregnant, he did not force her to have sex with him. She has not talked to him since the  and does not plan to be in contact with him again.   She reports \"private part\" pain since the  which is improving.   She has no concerns today    A exozet  was used for  this visit.      Problem, Medication and Allergy Lists were reviewed and are current.  Patient is an established patient of this clinic.         Review of Systems:   Review of Systems          Physical Exam:   Patient Vitals for the past 24 hrs:   BP Temp Temp src Pulse Resp SpO2 Weight   18 0853 108/75 97.8  F (36.6  C) Oral 69 16 99 % 83 lb 6.4 oz (37.8 kg)     Body mass index is 16.37 kg/(m^2).     Physical Exam   Constitutional: She appears well-developed and well-nourished. No distress.   HENT:   Head: Normocephalic and atraumatic.   Eyes: Conjunctivae are normal.   Cardiovascular: Normal rate, regular rhythm and normal heart sounds.    No murmur heard.  Pulmonary/Chest: Effort normal and breath sounds " "normal. No respiratory distress. She has no wheezes.   Abdominal: Soft. Bowel sounds are normal. She exhibits no distension. There is no tenderness.   Musculoskeletal: Normal range of motion.   Neurological: She is alert.   Skin: Skin is warm and dry. No rash noted. She is not diaphoretic. No erythema. No pallor.   Psychiatric:   Patient smiling spontaneously and answers all questions when mother is not present. Intermittent periods where she is more withdrawn, only giving head movement yes/no answers, but able to re-engage   Vitals reviewed.      Results:      Results from the last 24 hours  Results for orders placed or performed in visit on 07/03/18 (from the past 24 hour(s))   Treponema Abs w Reflex to RPR and Titer   Result Value Ref Range    Treponema Antibodies Nonreactive NR^Nonreactive   HIV Antigen Antibody Combo   Result Value Ref Range    HIV Antigen Antibody Combo Nonreactive NR^Nonreactive       Hepatitis B surface antigen   Result Value Ref Range    Hep B Surface Agn Nonreactive NR^Nonreactive   Hepatitis B Surface Antibody   Result Value Ref Range    Hepatitis B Surface Antibody 381.53 (H) <8.00 m[IU]/mL   Hepatitis C antibody   Result Value Ref Range    Hepatitis C Antibody Nonreactive NR^Nonreactive     Assessment and Plan     24yo Vietnamese woman with severe autism and mild cognitive delay    1. STD testing / contraception  STI screen from unprotected intercourse several months ago. Patient able to report today that the sex that led to her pregnancy was \"not forced\". Patient reports she is not currently sexually active. Long discussion today regarding possible future sexual activity and recommendation for initiation of contraception prior to any future sex.   - Treponema Abs w Reflex to RPR and Titer  - HIV Antigen Antibody Combo  - Hepatitis B surface antigen  - Hepatitis B Surface Antibody  - Hepatitis C antibody    Health Maintenance  - due for pap, declined pelvic today due to menses  - deferred " HPV shot to next visit    2. Slow transit constipation  - polyethylene glycol (MIRALAX) powder; Take 17 g (1 capful) by mouth daily  Dispense: 510 g; Refill: 11    3. Adjustment disorder with depressed mood  Mood is markedly improved from last 2 visits.   - mirtazapine (REMERON) 45 MG tablet; Take 1 tablet (45 mg) by mouth At Bedtime  Dispense: 31 tablet; Refill: 11    4. Cachexia (H)  Body mass index is 16.37 kg/(m^2). , weight has remained stable, will monitor as appetite continues to improve.   - multivitamin, therapeutic with minerals (MULTI-VITAMIN) TABS tablet; Take 1 tablet by mouth daily  Dispense: 100 tablet; Refill: 3    5. Gastroesophageal reflux disease, esophagitis presence not specified  - ranitidine (ZANTAC) 150 MG tablet; Take 1 tablet (150 mg) by mouth 2 times daily  Dispense: 60 tablet; Refill: 11    6. Chronic allergic rhinitis due to pollen, unspecified seasonality  - fluticasone (FLONASE) 50 MCG/ACT spray; Spray 1-2 sprays into both nostrils daily  Dispense: 16 g; Refill: 3    If lower stomach pain returns, please return to clinic    Follow up plan  Please make a clinic appointment for follow up with me (CHA WHEELER) in 1-2 months for follow up abdominal pain.   - letter if results normal  - phone call if abnormal labs today    There are no discontinued medications.  Options for treatment and follow-up care were reviewed with the patient. Rupinder Spaulding  engaged in the decision making process and verbalized understanding of the options discussed and agreed with the final plan.    I spent 25 min face to face with the patient and >50% was spent counselling the patient about the above medical conditions, educating patient and discussing the recommendations and followup .    Cha Wheeler MD  [facility], [facility]

## 2018-07-03 NOTE — LETTER
July 4, 2018      Rupinder Spaulding  [address]  [facility] [address]    Dear Rupinder,    Thank you for getting your care at [facility]. Please see below for your test results.    All of your lab tests were normal and negative. This is reassuring.     Resulted Orders   Treponema Abs w Reflex to RPR and Titer   Result Value Ref Range    Treponema Antibodies Nonreactive NR^Nonreactive   HIV Antigen Antibody Combo   Result Value Ref Range    HIV Antigen Antibody Combo Nonreactive NR^Nonreactive          Comment:      HIV-1 p24 Ag & HIV-1/HIV-2 Ab Not Detected   Hepatitis B surface antigen   Result Value Ref Range    Hep B Surface Agn Nonreactive NR^Nonreactive   Hepatitis B Surface Antibody   Result Value Ref Range    Hepatitis B Surface Antibody 381.53 (H) <8.00 m[IU]/mL      Comment:      Reactive, Patient is considered to be immune to infection with hepatitis B   when the value is greater than or equal to 12.0 m[IU]/mL.     Hepatitis C antibody   Result Value Ref Range    Hepatitis C Antibody Nonreactive NR^Nonreactive      Comment:      Assay performance characteristics have not been established for newborns,   infants, and children       Your results are reassuring.If you have questions please contact the clinic to make an appointment with  me or your primary doctor to discuss the results. Montserratian translation:segundo mars gu kameron salasaabtaadii brent browning qaedwaro francisco negreteo renate dhaqtamelissa lazou kamalaaska delisa limono aide rose jawaabtaada.    Sincerely,    Cha Hussein MD

## 2018-07-03 NOTE — PATIENT INSTRUCTIONS
Here is the plan from today's visit    1. Healthcare maintenance    - Treponema Abs w Reflex to RPR and Titer  - HIV Antigen Antibody Combo  - Hepatitis B surface antigen  - Hepatitis B Surface Antibody  - Hepatitis C antibody    2. Slow transit constipation    - polyethylene glycol (MIRALAX) powder; Take 17 g (1 capful) by mouth daily  Dispense: 510 g; Refill: 11    3. Adjustment disorder with depressed mood    - mirtazapine (REMERON) 45 MG tablet; Take 1 tablet (45 mg) by mouth At Bedtime  Dispense: 31 tablet; Refill: 11    4. Cachexia (H)    - multivitamin, therapeutic with minerals (MULTI-VITAMIN) TABS tablet; Take 1 tablet by mouth daily  Dispense: 100 tablet; Refill: 3    5. Gastroesophageal reflux disease, esophagitis presence not specified    - ranitidine (ZANTAC) 150 MG tablet; Take 1 tablet (150 mg) by mouth 2 times daily  Dispense: 60 tablet; Refill: 11    6. Chronic allergic rhinitis due to pollen, unspecified seasonality    - fluticasone (FLONASE) 50 MCG/ACT spray; Spray 1-2 sprays into both nostrils daily  Dispense: 16 g; Refill: 3    If lower stomach pain returns, please return to clinic    Follow up plan  Please make a clinic appointment for follow up with me (CHERRI WHEELER) in 1-2 months for follow up abdominal pain.   - letter if results normal  - phone call if abnormal labs today    Thank you for coming to [facility] today.  Lab Testing:  **If you had lab testing today and your results are reassuring or normal they will be mailed to you or sent through MtoV within 7 days.   **If the lab tests need quick action we will call you with the results.  The phone number we will call with results is # [phone] (home) . If this is not the best number please call our clinic and change the number.  Medication Refills:  If you need any refills please call your pharmacy and they will contact us.   If you need to  your refill at a new pharmacy, please contact the new pharmacy directly.  The new pharmacy will help you get your medications transferred faster.   Scheduling:  If you have any concerns about today's visit or wish to schedule another appointment please call our office during normal business hours [phone] (8-5:00 M-F)  If a referral was made to a [facility] and you don't get a call from central scheduling please call [phone].  If a Mammogram was ordered for you at [facility] call [phone] to schedule or change your appointment.  If you had an XRay/CT/Ultrasound/MRI ordered the number is [phone] to schedule or change your radiology appointment.   Medical Concerns:  If you have urgent medical concerns please call [phone] at any time of the day.    Cha Hussein MD

## 2018-07-03 NOTE — MR AVS SNAPSHOT
After Visit Summary   7/3/2018    Rupinder Spaulding    MRN: [id number]           Patient Information     Date Of Birth          1995        Visit Information        Provider Department      7/3/2018 9:00 AM Cha Wheeler MD [facility]        Today's Diagnoses     Healthcare maintenance    -  1    Slow transit constipation        Adjustment disorder with depressed mood        Cachexia (H)        Gastroesophageal reflux disease, esophagitis presence not specified        Chronic allergic rhinitis due to pollen, unspecified seasonality          Care Instructions    Here is the plan from today's visit    1. Healthcare maintenance    - Treponema Abs w Reflex to RPR and Titer  - HIV Antigen Antibody Combo  - Hepatitis B surface antigen  - Hepatitis B Surface Antibody  - Hepatitis C antibody    2. Slow transit constipation    - polyethylene glycol (MIRALAX) powder; Take 17 g (1 capful) by mouth daily  Dispense: 510 g; Refill: 11    3. Adjustment disorder with depressed mood    - mirtazapine (REMERON) 45 MG tablet; Take 1 tablet (45 mg) by mouth At Bedtime  Dispense: 31 tablet; Refill: 11    4. Cachexia (H)    - multivitamin, therapeutic with minerals (MULTI-VITAMIN) TABS tablet; Take 1 tablet by mouth daily  Dispense: 100 tablet; Refill: 3    5. Gastroesophageal reflux disease, esophagitis presence not specified    - ranitidine (ZANTAC) 150 MG tablet; Take 1 tablet (150 mg) by mouth 2 times daily  Dispense: 60 tablet; Refill: 11    6. Chronic allergic rhinitis due to pollen, unspecified seasonality    - fluticasone (FLONASE) 50 MCG/ACT spray; Spray 1-2 sprays into both nostrils daily  Dispense: 16 g; Refill: 3    If lower stomach pain returns, please return to clinic    Follow up plan  Please make a clinic appointment for follow up with me (CHA WHEELER) in 1-2 months for follow up abdominal pain.   - letter if results normal  - phone call if abnormal labs today    Thank you for  coming to [facility] today.  Lab Testing:  **If you had lab testing today and your results are reassuring or normal they will be mailed to you or sent through Retrace within 7 days.   **If the lab tests need quick action we will call you with the results.  The phone number we will call with results is # [phone] (home) . If this is not the best number please call our clinic and change the number.  Medication Refills:  If you need any refills please call your pharmacy and they will contact us.   If you need to  your refill at a new pharmacy, please contact the new pharmacy directly. The new pharmacy will help you get your medications transferred faster.   Scheduling:  If you have any concerns about today's visit or wish to schedule another appointment please call our office during normal business hours [phone] (8-5:00 M-F)  If a referral was made to a [facility] and you don't get a call from central scheduling please call [phone].  If a Mammogram was ordered for you at [facility] call [phone] to schedule or change your appointment.  If you had an XRay/CT/Ultrasound/MRI ordered the number is [phone] to schedule or change your radiology appointment.   Medical Concerns:  If you have urgent medical concerns please call [phone] at any time of the day.    Cha Hussein MD            Follow-ups after your visit        Who to contact     Please call your clinic at [phone] to:    Ask questions about your health    Make or cancel appointments    Discuss your medicines    Learn about your test results    Speak to your doctor            Additional Information About Your Visit        Care EveryWhere ID     This is your Care EveryWhere ID. This could be used by other organizations to access your [place] medical records  [id number]        Your Vitals Were     Pulse Temperature Respirations Last Period Pulse Oximetry Breastfeeding?    69 97.8  F  (36.6  C) (Oral) 16 07/01/2018 99% No    BMI (Body Mass Index)                   16.37 kg/m2            Blood Pressure from Last 3 Encounters:   07/03/18 108/75   06/04/18 106/76   04/26/18 105/73    Weight from Last 3 Encounters:   07/03/18 83 lb 6.4 oz (37.8 kg)   06/04/18 83 lb 12.8 oz (38 kg)   04/26/18 91 lb 9.6 oz (41.5 kg)              We Performed the Following     Hepatitis B Surface Antibody     Hepatitis B surface antigen     Hepatitis C antibody     HIV Antigen Antibody Combo     Treponema Abs w Reflex to RPR and Titer          Where to get your medicines      These medications were sent to [facility] - [place], [place] - [address]  [address] [address]     Phone:  [phone]     fluticasone 50 MCG/ACT spray    ibuprofen 400 MG tablet    mirtazapine 45 MG tablet    multivitamin, therapeutic with minerals Tabs tablet    polyethylene glycol powder    ranitidine 150 MG tablet          Primary Care Provider Office Phone # Fax #    Reuben Mixon [phone]       [address] [address]        Equal Access to Services     GITA WEBB AH: Hadii ranjith belloo Soshawn, waaxda luqadaha, qaybta kaalmada adeegyada, austin hercules. [facility] [phone].    ATENCIÓN: Si habla español, tiene a causey disposición servicios gratuitos de asistencia lingüística. [facility] [phone].    We comply with applicable federal civil rights laws and [place] laws. We do not discriminate on the basis of race, color, national origin, age, disability, sex, sexual orientation, or gender identity.            Thank you!     Thank you for choosing [facility]  for your care. Our goal is always to provide you with excellent care. Hearing back from our patients is one way we can continue to improve our services. Please take a few minutes to complete the written survey that you may receive in the mail after  your visit with us. Thank you!             Your Updated Medication List - Protect others around you: Learn how to safely use, store and throw away your medicines at www.disposemymeds.org.          This list is accurate as of 7/3/18  9:24 AM.  Always use your most recent med list.                   Brand Name Dispense Instructions for use Diagnosis    Calcium Carbonate Antacid 1177 MG Chew     84 tablet    Take 1 chew tab by mouth 2 times daily as needed    Gastroesophageal reflux disease, esophagitis presence not specified       fluticasone 50 MCG/ACT spray    FLONASE    16 g    Spray 1-2 sprays into both nostrils daily    Chronic allergic rhinitis due to pollen, unspecified seasonality       ibuprofen 400 MG tablet    ADVIL/MOTRIN    120 tablet    Take 1 tablet (400 mg) by mouth every 6 hours as needed for moderate pain        loratadine 10 MG tablet    CLARITIN    90 tablet    Take 1 tablet (10 mg) by mouth daily    Chronic rhinitis, unspecified type       mirtazapine 45 MG tablet    REMERON    31 tablet    Take 1 tablet (45 mg) by mouth At Bedtime    Adjustment disorder with depressed mood       multivitamin, therapeutic with minerals Tabs tablet     100 tablet    Take 1 tablet by mouth daily    Cachexia (H)       PEDIASURE Liqd     90 each    Take 1 Can by mouth 3 times daily    Cachexia (H)       polyethylene glycol powder    MIRALAX    510 g    Take 17 g (1 capful) by mouth daily    Slow transit constipation       ranitidine 150 MG tablet    ZANTAC    60 tablet    Take 1 tablet (150 mg) by mouth 2 times daily    Gastroesophageal reflux disease, esophagitis presence not specified

## 2018-07-03 NOTE — NURSING NOTE
Due to patient being non-English speaking/uses sign language, an  was used for this visit. Only for face-to-face interpretation by an external agency, date and length of interpretation can be found on the scanned worksheet.     name: Rebecca  Agency: [employer]  Language: Northern Irish   Telephone number: [phone]  Type of interpretation: Face-to-face, spoken   SUZAN Murrell 8:52 AM July 3, 2018

## 2018-08-03 ENCOUNTER — OFFICE VISIT (OUTPATIENT)
Dept: FAMILY MEDICINE | Facility: CLINIC | Age: 23
End: 2018-08-03
Payer: COMMERCIAL

## 2018-08-03 VITALS
WEIGHT: 80.2 LBS | BODY MASS INDEX: 15.75 KG/M2 | SYSTOLIC BLOOD PRESSURE: 109 MMHG | DIASTOLIC BLOOD PRESSURE: 76 MMHG | TEMPERATURE: 98.2 F | OXYGEN SATURATION: 100 % | HEART RATE: 87 BPM

## 2018-08-03 DIAGNOSIS — N89.8 VAGINAL DISCHARGE: ICD-10-CM

## 2018-08-03 DIAGNOSIS — R64 CACHEXIA (H): ICD-10-CM

## 2018-08-03 DIAGNOSIS — F43.21 ADJUSTMENT DISORDER WITH DEPRESSED MOOD: ICD-10-CM

## 2018-08-03 DIAGNOSIS — R30.0 DYSURIA: Primary | ICD-10-CM

## 2018-08-03 DIAGNOSIS — R63.4 WEIGHT LOSS: ICD-10-CM

## 2018-08-03 DIAGNOSIS — Z32.00 PREGNANCY EXAMINATION OR TEST, PREGNANCY UNCONFIRMED: ICD-10-CM

## 2018-08-03 DIAGNOSIS — T74.21XD SEXUAL ASSAULT OF ADULT, SUBSEQUENT ENCOUNTER: ICD-10-CM

## 2018-08-03 LAB
BILIRUBIN UR: NEGATIVE
BLOOD UR: NEGATIVE
GLUCOSE URINE: NEGATIVE
HCG UR QL: NEGATIVE
KETONES UR QL: NEGATIVE
LEUKOCYTE ESTERASE UR: NEGATIVE
NITRITE UR QL STRIP: NEGATIVE
PH UR STRIP: 5.5 [PH] (ref 5–7)
PROTEIN UR: NEGATIVE
SP GR UR STRIP: >=1.03
UROBILINOGEN UR STRIP-ACNC: NORMAL

## 2018-08-03 RX ORDER — MIRTAZAPINE 45 MG/1
45 TABLET, FILM COATED ORAL AT BEDTIME
Qty: 31 TABLET | Refills: 11 | Status: SHIPPED | OUTPATIENT
Start: 2018-08-03 | End: 2018-12-18

## 2018-08-03 RX ORDER — FLUCONAZOLE 150 MG/1
150 TABLET ORAL ONCE
Qty: 1 TABLET | Refills: 0 | Status: SHIPPED | OUTPATIENT
Start: 2018-08-03 | End: 2018-08-03

## 2018-08-03 RX ORDER — IBUPROFEN 400 MG/1
400 TABLET, FILM COATED ORAL EVERY 6 HOURS PRN
Qty: 120 TABLET | Refills: 3 | Status: SHIPPED | OUTPATIENT
Start: 2018-08-03 | End: 2018-12-18

## 2018-08-03 RX ORDER — MULTIPLE VITAMINS W/ MINERALS TAB 9MG-400MCG
1 TAB ORAL DAILY
Qty: 100 TABLET | Refills: 3 | Status: SHIPPED | OUTPATIENT
Start: 2018-08-03 | End: 2018-12-18

## 2018-08-03 RX ORDER — QUETIAPINE FUMARATE 25 MG/1
25 TABLET, FILM COATED ORAL AT BEDTIME
Qty: 30 TABLET | Refills: 1 | Status: SHIPPED | OUTPATIENT
Start: 2018-08-03 | End: 2018-10-10

## 2018-08-03 NOTE — PATIENT INSTRUCTIONS
Here is the plan from today's visit    1. Cachexia (H)  Increase to 4 cans   - multivitamin, therapeutic with minerals (MULTI-VITAMIN) TABS tablet; Take 1 tablet by mouth daily  Dispense: 100 tablet; Refill: 3    2. Adjustment disorder with depressed mood    - mirtazapine (REMERON) 45 MG tablet; Take 1 tablet (45 mg) by mouth At Bedtime  Dispense: 31 tablet; Refill: 11  - QUEtiapine (SEROQUEL) 25 MG tablet; Take 1 tablet (25 mg) by mouth At Bedtime  Dispense: 30 tablet; Refill: 1    3. Dysuria    - ibuprofen (ADVIL/MOTRIN) 400 MG tablet; Take 1 tablet (400 mg) by mouth every 6 hours as needed for moderate pain  Dispense: 120 tablet; Refill: 3  - NEISSERIA GONORRHOEA PCR  - CHLAMYDIA TRACHOMATIS PCR  - UA reflex to Microscopic  - Urine Culture Aerobic Bacterial    4 Vaginal discharge  Diflucan 150 mg one time   4. Weight loss  Increase Ensure to 4   - Comprehensive Metabolic Panel ([facility])      Please call or return to clinic if your symptoms don't go away.    Follow up plan  Please make a clinic appointment for follow up with me (JOSE STAHL) in 2  weeks for recheck of weight. .    Thank you for coming to [facility] today.  Lab Testing:  **If you had lab testing today and your results are reassuring or normal they will be mailed to you or sent through [employer] within 7 days.   **If the lab tests need quick action we will call you with the results.  The phone number we will call with results is # [phone] (home) . If this is not the best number please call our clinic and change the number.  Medication Refills:  If you need any refills please call your pharmacy and they will contact us.   If you need to  your refill at a new pharmacy, please contact the new pharmacy directly. The new pharmacy will help you get your medications transferred faster.   Scheduling:  If you have any concerns about today's visit or wish to schedule another appointment please call our office during normal business hours  [phone] (8-5:00 M-F)  If a referral was made to a [facility] and you don't get a call from central scheduling please call [phone].  If a Mammogram was ordered for you at [facility] call [phone] to schedule or change your appointment.  If you had an XRay/CT/Ultrasound/MRI ordered the number is [phone] to schedule or change your radiology appointment.   Medical Concerns:  If you have urgent medical concerns please call [phone] at any time of the day.    Reuben Mixon MD

## 2018-08-03 NOTE — LETTER
August 6, 2018      Rupinder Spaulding  [address]  [facility] [address]        Dear Rupinder,    Thank you for getting your care at [facility]. Please see below for your test results.    Resulted Orders   NEISSERIA GONORRHOEA PCR   Result Value Ref Range    Specimen Descrip Urine     N Gonorrhea PCR Negative NEG^Negative      Comment:      Negative for N. gonorrhoeae rRNA by transcription mediated amplification.  A negative result by transcription mediated amplification does not preclude   the presence of N. gonorrhoeae infection because results are dependent on   proper and adequate collection, absence of inhibitors, and sufficient rRNA to   be detected.     CHLAMYDIA TRACHOMATIS PCR   Result Value Ref Range    Specimen Description Urine     Chlamydia Trachomatis PCR Negative NEG^Negative      Comment:      Negative for C. trachomatis rRNA by transcription mediated amplification.  A negative result by transcription mediated amplification does not preclude   the presence of C. trachomatis infection because results are dependent on   proper and adequate collection, absence of inhibitors, and sufficient rRNA to   be detected.     Urine Culture Aerobic Bacterial   Result Value Ref Range    Specimen Description Midstream Urine     Special Requests Specimen received in preservative     Culture Micro >100,000 colonies/mL  mixed urogenital kathy      Urinalysis, Micro If (UA) ([facility])   Result Value Ref Range    Specific Gravity Urine >=1.030 1.005 - 1.030    pH Urine 5.5 4.5 - 8.0    Leukocyte Esterase UR Negative NEGATIVE    Nitrite Urine Negative NEGATIVE    Protein UR Negative NEGATIVE    Glucose Urine Negative NEGATIVE    Ketones Urine Negative NEGATIVE    Urobilinogen mg/dL 0.2 E.U./dL 0.2 E.U./dL    Bilirubin UR Negative NEGATIVE    Blood UR Negative NEGATIVE   HCG Qualitative Urine (UPT) ([facility])   Result Value Ref Range    HCG Qual Urine NEGATIVE Negative       Your labs are within normal limits.  Follow up  as you and Dr. Mixon discussed.    Sincerely,    Byron Monge MD  For  Constantino Mixon MD
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
## 2018-08-03 NOTE — MR AVS SNAPSHOT
After Visit Summary   8/3/2018    Rupinder Spaulding    MRN: [id number]           Patient Information     Date Of Birth          1995        Visit Information        Provider Department      8/3/2018 9:20 AM Reuben Mixon MD [facility]        Today's Diagnoses     Dysuria    -  1    Cachexia (H)        Adjustment disorder with depressed mood        Weight loss        Vaginal discharge          Care Instructions    Here is the plan from today's visit    1. Cachexia (H)  Increase to 4 cans   - multivitamin, therapeutic with minerals (MULTI-VITAMIN) TABS tablet; Take 1 tablet by mouth daily  Dispense: 100 tablet; Refill: 3    2. Adjustment disorder with depressed mood    - mirtazapine (REMERON) 45 MG tablet; Take 1 tablet (45 mg) by mouth At Bedtime  Dispense: 31 tablet; Refill: 11  - QUEtiapine (SEROQUEL) 25 MG tablet; Take 1 tablet (25 mg) by mouth At Bedtime  Dispense: 30 tablet; Refill: 1    3. Dysuria    - ibuprofen (ADVIL/MOTRIN) 400 MG tablet; Take 1 tablet (400 mg) by mouth every 6 hours as needed for moderate pain  Dispense: 120 tablet; Refill: 3  - NEISSERIA GONORRHOEA PCR  - CHLAMYDIA TRACHOMATIS PCR  - UA reflex to Microscopic  - Urine Culture Aerobic Bacterial    4 Vaginal discharge  Diflucan 150 mg one time   4. Weight loss  Increase Ensure to 4   - Comprehensive Metabolic Panel ([facility])      Please call or return to clinic if your symptoms don't go away.    Follow up plan  Please make a clinic appointment for follow up with me (REUBEN MIXON) in 2  weeks for recheck of weight. .    Thank you for coming to [facility] today.  Lab Testing:  **If you had lab testing today and your results are reassuring or normal they will be mailed to you or sent through [employer] within 7 days.   **If the lab tests need quick action we will call you with the results.  The phone number we will call with results is # [phone] (home) . If this is not the best number please call our  clinic and change the number.  Medication Refills:  If you need any refills please call your pharmacy and they will contact us.   If you need to  your refill at a new pharmacy, please contact the new pharmacy directly. The new pharmacy will help you get your medications transferred faster.   Scheduling:  If you have any concerns about today's visit or wish to schedule another appointment please call our office during normal business hours [phone] (8-5:00 M-F)  If a referral was made to a [facility] and you don't get a call from central scheduling please call [phone].  If a Mammogram was ordered for you at [facility] call [phone] to schedule or change your appointment.  If you had an XRay/CT/Ultrasound/MRI ordered the number is [phone] to schedule or change your radiology appointment.   Medical Concerns:  If you have urgent medical concerns please call [phone] at any time of the day.    Reuben Mixon MD            Follow-ups after your visit        Who to contact     Please call your clinic at [phone] to:    Ask questions about your health    Make or cancel appointments    Discuss your medicines    Learn about your test results    Speak to your doctor            Additional Information About Your Visit        Care EveryWhere ID     This is your Care EveryWhere ID. This could be used by other organizations to access your [place] medical records  [id number]        Your Vitals Were     Pulse Temperature Last Period Pulse Oximetry BMI (Body Mass Index)       87 98.2  F (36.8  C) (Oral) 07/20/2018 (Exact Date) 100% 15.75 kg/m2        Blood Pressure from Last 3 Encounters:   08/03/18 109/76   07/03/18 108/75   06/04/18 106/76    Weight from Last 3 Encounters:   08/03/18 80 lb 3.2 oz (36.4 kg)   07/03/18 83 lb 6.4 oz (37.8 kg)   06/04/18 83 lb 12.8 oz (38 kg)              We Performed the Following     CHLAMYDIA TRACHOMATIS PCR     Comprehensive  Metabolic Panel ([facility])     NEISSERIA GONORRHOEA PCR     Urinalysis, Micro If (UA) ([facility])     Urine Culture Aerobic Bacterial          Today's Medication Changes          These changes are accurate as of 8/3/18 10:27 AM.  If you have any questions, ask your nurse or doctor.               Start taking these medicines.        Dose/Directions    fluconazole 150 MG tablet   Commonly known as:  DIFLUCAN   Used for:  Vaginal discharge   Started by:  Reuben Mixon MD        Dose:  150 mg   Take 1 tablet (150 mg) by mouth once for 1 dose   Quantity:  1 tablet   Refills:  0       QUEtiapine 25 MG tablet   Commonly known as:  SEROQUEL   Used for:  Adjustment disorder with depressed mood   Started by:  Reuben Mixon MD        Dose:  25 mg   Take 1 tablet (25 mg) by mouth At Bedtime   Quantity:  30 tablet   Refills:  1            Where to get your medicines      These medications were sent to [facility] - [place], [place] - [address] [place] [address]  [address] [facility] [address] [address]     Phone:  [phone]     fluconazole 150 MG tablet    ibuprofen 400 MG tablet    mirtazapine 45 MG tablet    multivitamin, therapeutic with minerals Tabs tablet    QUEtiapine 25 MG tablet                Primary Care Provider Office Phone # Fax #    Reuben Mixon [phone]       [address] [address]        Equal Access to Services     GITA WEBB AH: Hadii aad ku hadasho Soomaali, waaxda luqadaha, qaybta kaalmada adeegyada, austin hercules. So [facility] [phone].    ATENCIÓN: Si habla español, tiene a causey disposición servicios gratuitos de asistencia lingüística. Llame al [phone].    We comply with applicable federal civil rights laws and [place] laws. We do not discriminate on the basis of race, color, national origin, age, disability, sex, sexual orientation, or gender identity.            Thank you!     Thank you for choosing [facility]  for your care. Our goal is always to provide you with excellent care. Hearing back from our patients is one way we can continue to improve our services. Please take a few minutes to complete the written survey that you may receive in the mail after your visit with us. Thank you!             Your Updated Medication List - Protect others around you: Learn how to safely use, store and throw away your medicines at www.disposemymeds.org.          This list is accurate as of 8/3/18 10:27 AM.  Always use your most recent med list.                   Brand Name Dispense Instructions for use Diagnosis    Calcium Carbonate Antacid 1177 MG Chew     84 tablet    Take 1 chew tab by mouth 2 times daily as needed    Gastroesophageal reflux disease, esophagitis presence not specified       fluconazole 150 MG tablet    DIFLUCAN    1 tablet    Take 1 tablet (150 mg) by mouth once for 1 dose    Vaginal discharge       fluticasone 50 MCG/ACT spray    FLONASE    16 g    Spray 1-2 sprays into both nostrils daily    Chronic allergic rhinitis due to pollen, unspecified seasonality       ibuprofen 400 MG tablet    ADVIL/MOTRIN    120 tablet    Take 1 tablet (400 mg) by mouth every 6 hours as needed for moderate pain    Dysuria       loratadine 10 MG tablet    CLARITIN    90 tablet    Take 1 tablet (10 mg) by mouth daily    Chronic rhinitis, unspecified type       mirtazapine 45 MG tablet    REMERON    31 tablet    Take 1 tablet (45 mg) by mouth At Bedtime    Adjustment disorder with depressed mood       multivitamin, therapeutic with minerals Tabs tablet     100 tablet    Take 1 tablet by mouth daily    Cachexia (H)       PEDIASURE Liqd     90 each    Take 1 Can by mouth 3 times daily    Cachexia (H)       polyethylene glycol powder    MIRALAX    510 g    Take 17 g (1 capful) by mouth daily    Slow transit constipation       QUEtiapine 25 MG tablet    SEROQUEL    30 tablet    Take 1 tablet (25 mg) by mouth At Bedtime     Adjustment disorder with depressed mood       ranitidine 150 MG tablet    ZANTAC    60 tablet    Take 1 tablet (150 mg) by mouth 2 times daily    Gastroesophageal reflux disease, esophagitis presence not specified

## 2018-08-03 NOTE — NURSING NOTE
Due to patient being non-English speaking/uses sign language, an  was used for this visit. Only for face-to-face interpretation by an external agency, date and length of interpretation can be found on the scanned worksheet.     name: Abdifatah Ocampo  Agency: [employer]  Language: Singaporean   Telephone number: [phone]  Type of interpretation: Face-to-face, spoken

## 2018-08-04 LAB
BACTERIA SPEC CULT: NORMAL
Lab: NORMAL
SPECIMEN SOURCE: NORMAL

## 2018-08-04 ASSESSMENT — ENCOUNTER SYMPTOMS
TROUBLE SWALLOWING: 0
FEVER: 0
WHEEZING: 0
ABDOMINAL PAIN: 0
EYE PAIN: 0
DYSURIA: 0
COUGH: 0

## 2018-08-05 LAB
C TRACH DNA SPEC QL NAA+PROBE: NEGATIVE
N GONORRHOEA DNA SPEC QL NAA+PROBE: NEGATIVE
SPECIMEN SOURCE: NORMAL
SPECIMEN SOURCE: NORMAL

## 2018-09-25 ENCOUNTER — OFFICE VISIT (OUTPATIENT)
Dept: FAMILY MEDICINE | Facility: CLINIC | Age: 23
End: 2018-09-25
Payer: COMMERCIAL

## 2018-09-25 VITALS
TEMPERATURE: 97.3 F | RESPIRATION RATE: 20 BRPM | DIASTOLIC BLOOD PRESSURE: 79 MMHG | HEART RATE: 84 BPM | WEIGHT: 80.4 LBS | BODY MASS INDEX: 15.78 KG/M2 | OXYGEN SATURATION: 100 % | SYSTOLIC BLOOD PRESSURE: 114 MMHG

## 2018-09-25 DIAGNOSIS — J02.0 STREPTOCOCCAL SORE THROAT: Primary | ICD-10-CM

## 2018-09-25 ASSESSMENT — ENCOUNTER SYMPTOMS
WEAKNESS: 0
UNEXPECTED WEIGHT CHANGE: 0
SORE THROAT: 1
TROUBLE SWALLOWING: 0
SHORTNESS OF BREATH: 0
VOICE CHANGE: 0
CHEST TIGHTNESS: 0
CONSTIPATION: 0
DIAPHORESIS: 0
PALPITATIONS: 0
ENDOCRINE NEGATIVE: 1
STRIDOR: 0
SPEECH DIFFICULTY: 0
NERVOUS/ANXIOUS: 0
HEADACHES: 0
MYALGIAS: 0
CHILLS: 0
ARTHRALGIAS: 0
ACTIVITY CHANGE: 0
DYSURIA: 0
VOMITING: 0
DIFFICULTY URINATING: 0
NAUSEA: 0
FACIAL ASYMMETRY: 0
SEIZURES: 0
HEMATOLOGIC/LYMPHATIC NEGATIVE: 1
ABDOMINAL PAIN: 0
COLOR CHANGE: 0
DIARRHEA: 0
DYSPHORIC MOOD: 1
ALLERGIC/IMMUNOLOGIC NEGATIVE: 1
APPETITE CHANGE: 0
NUMBNESS: 0
FEVER: 1
FATIGUE: 0
EYES NEGATIVE: 1
WHEEZING: 0

## 2018-09-25 NOTE — NURSING NOTE
Due to patient being non-English speaking/uses sign language, an  was used for this visit. Only for face-to-face interpretation by an external agency, date and length of interpretation can be found on the scanned worksheet.     name: Abdifatah  Agency: [employer]  Language: Austrian   Telephone number: [phone]  Type of interpretation: Face-to-face, spoken     SUZAN Murrell 2:31 PM September 25, 2018

## 2018-09-25 NOTE — PROGRESS NOTES
HPI       Rupinder Spaulding is a 23 year old  who presents for   Chief Complaint   Patient presents with     Fever     and sore throat for about a week     Pharyngitis     Acute Illness   Concerns:   When did it start? 1 week  Is it getting better, worse or staying the same? better    Fatigue/Achiness?:No     Fever?:  YES subjective, not in clinic.    Chills/Sweats?: No     Headache (location?)No     Sinus Pressure?:No     Eye redness/Discharge?: No     Ear Pain?: No     Runny nose?: No     Congestion?: No     Sore Throat?:  YES   Respiratory    Cough?: no     Wheeze?: No   GI/    Decreased Appetite?: No     Nausea?:No     Vomiting?: No     Diarrhea?:  No     Dysuria/Frequency?.:No       Any Illness Exposure?: No     Any foreign travel or contact with anyone ill who travelled abroad? No     Therapies Tried and outcome: Nothing    A [employer]  was used for  this visit.      Problem, Medication and Allergy Lists were reviewed and updated if needed.  Patient is an established patient of this clinic.       Review of Systems:   Review of Systems   Constitutional: Positive for fever. Negative for activity change, appetite change, chills, diaphoresis, fatigue and unexpected weight change.   HENT: Positive for sore throat. Negative for ear discharge, ear pain, hearing loss, nosebleeds, trouble swallowing and voice change.    Eyes: Negative.    Respiratory: Negative for chest tightness, shortness of breath, wheezing and stridor.    Cardiovascular: Negative for chest pain, palpitations and leg swelling.   Gastrointestinal: Negative for abdominal pain, constipation, diarrhea, nausea and vomiting.   Endocrine: Negative.    Genitourinary: Negative.  Negative for difficulty urinating and dysuria.   Musculoskeletal: Negative for arthralgias and myalgias.   Skin: Negative.  Negative for color change.   Allergic/Immunologic: Negative.    Neurological: Negative for seizures, syncope, facial asymmetry, speech difficulty,  weakness, numbness and headaches.   Hematological: Negative.    Psychiatric/Behavioral: Positive for dysphoric mood (irritability). The patient is not nervous/anxious.    All other systems reviewed and are negative.         Physical Exam:     Vitals:    09/25/18 1429   BP: 114/79   BP Location: Left arm   Patient Position: Sitting   Cuff Size: Adult Regular   Pulse: 84   Resp: 20   Temp: 97.3  F (36.3  C)   TempSrc: Oral   SpO2: 100%   Weight: 80 lb 6.4 oz (36.5 kg)     Body mass index is 15.78 kg/(m^2).  Vitals were reviewed and were normal     Physical Exam   Constitutional: She appears well-developed and well-nourished. No distress.   HENT:   Head: Normocephalic and atraumatic.   Right Ear: External ear normal.   Left Ear: External ear normal.   Nose: Nose normal.   Mouth/Throat: Oropharynx is clear and moist. No oropharyngeal exudate.   Eyes: EOM are normal. Pupils are equal, round, and reactive to light.   Neck: Neck supple. No tracheal deviation present. No thyromegaly present.   Cardiovascular: Normal rate.    Pulmonary/Chest: Effort normal and breath sounds normal. No respiratory distress. She has no wheezes.   Abdominal: Soft. Bowel sounds are normal. She exhibits no distension. There is no tenderness.   Musculoskeletal: She exhibits no deformity.   Lymphadenopathy:     She has no cervical adenopathy.   Neurological: She is alert.   Skin: Skin is warm and dry. She is not diaphoretic.   Psychiatric: She has a normal mood and affect.   Vitals reviewed.      Results:    Results from this visit  Results for orders placed or performed in visit on 09/25/18   Strep Culture (GABS)   Result Value Ref Range    Specimen Description Throat     Culture Micro       Negative after 24 hours, await final report at 48 hours.     Assessment and Plan      Rupinder was seen today with her mother for fever and pharyngitis. Rupinder is a 23-year-old female with a past medical history of congenital mental disability.  She lives with her  mother.  All history was reported by her mother regarding her recent behavior.  She is able to communicate verbally, but preferred speaking to her mother over speaking to me.    Initially she refused to permit [facility] to perform a throat swab, however after establishing rapport over the course of our visit, she said that she would do a throat swab with me, only that very moment.  I performed a strep culture swab, which she found very unpleasant.    If the strep culture comes back positive after 48 hours, I will communicate with her mother via telephone to ensure that she receives her antibiotics from the pharmacy.    Diagnoses and all orders for this visit:    Streptococcal sore throat  -     Cancel: Strep Screen Rapid (Group) ([facility])  -     Strep Culture (GABS)     There are no discontinued medications.    Options for treatment and follow-up care were reviewed with the patient. Rupinder Spaulding  engaged in the decision making process and verbalized understanding of the options discussed and agreed with the final plan.    DO [facility]  [phone]  [facility]

## 2018-09-25 NOTE — PROGRESS NOTES
Preceptor Attestation:   Patient seen, evaluated and discussed with the resident. I have verified the content of the note, which accurately reflects my assessment of the patient and the plan of care.   Supervising Physician:  Marge Harper MD

## 2018-09-25 NOTE — MR AVS SNAPSHOT
After Visit Summary   9/25/2018    Rupinder Spaulding    MRN: [id number]           Patient Information     Date Of Birth          1995        Visit Information        Provider Department      9/25/2018 3:00 PM Suly Smith,  [facility]        Today's Diagnoses     Streptococcal sore throat    -  1      Care Instructions    Here is the plan from today's visit    1. Streptococcal sore throat  - Strep Screen Rapid (Group) ([facility])    Please call or return to clinic if your symptoms don't go away.    Follow up plan  Please make a clinic appointment for follow up with me (SULY SMITH) as needed for follow up.    Thank you for coming to [facility] today.  Lab Testing:  **If you had lab testing today and your results are reassuring or normal they will be mailed to you or sent through [employer] within 7 days.   **If the lab tests need quick action we will call you with the results.  The phone number we will call with results is # [phone] (home) . If this is not the best number please call our clinic and change the number.  Medication Refills:  If you need any refills please call your pharmacy and they will contact us.   If you need to  your refill at a new pharmacy, please contact the new pharmacy directly. The new pharmacy will help you get your medications transferred faster.   Scheduling:  If you have any concerns about today's visit or wish to schedule another appointment please call our office during normal business hours [phone] (8-5:00 M-F)  If a referral was made to a [facility] and you don't get a call from central scheduling please call [phone].  If a Mammogram was ordered for you at [facility] call [phone] to schedule or change your appointment.  If you had an XRay/CT/Ultrasound/MRI ordered the number is [phone] to schedule or change your radiology appointment.   Medical Concerns:  If you have urgent  medical concerns please call [phone] at any time of the day.    Suly Smith, DO            Follow-ups after your visit        Your next 10 appointments already scheduled     Oct 03, 2018 10:20 AM CDT   Return Visit with MD [facility])    [address] [place],  [address]  [place] [id number]   [phone]              Who to contact     Please call your clinic at [phone] to:    Ask questions about your health    Make or cancel appointments    Discuss your medicines    Learn about your test results    Speak to your doctor            Additional Information About Your Visit        MyChart Information     uiut is an electronic gateway that provides easy, online access to your medical records. With Active-[employer]i, you can request a clinic appointment, read your test results, renew a prescription or communicate with your care team.     To sign up for uiut visit the website at [email or web address]   You will be asked to enter the access code listed below, as well as some personal information. Please follow the directions to create your username and password.     Your access code is: [email or web address]  Expires: 2018  3:05 PM     Your access code will  in 90 days. If you need help or a new code, please contact your [facility] or call [phone] for assistance.        Care EveryWhere ID     This is your Care EveryWhere ID. This could be used by other organizations to access your [place] medical records  [id number]        Your Vitals Were     Pulse Temperature Respirations Last Period Pulse Oximetry BMI (Body Mass Index)    84 97.3  F (36.3  C) (Oral) 20 2018 100% 15.78 kg/m2       Blood Pressure from Last 3 Encounters:   18 114/79   18 109/76   18 108/75    Weight from Last 3 Encounters:   18 80 lb 6.4 oz (36.5 kg)   18 80 lb 3.2 oz (36.4 kg)   18 83 lb 6.4 oz  (37.8 kg)              We Performed the Following     Strep Screen Rapid (Group) ([facility])        Primary Care Provider Office Phone # Fax #    Reuben Mixon [phone]       [address] [address]        Equal Access to Services     GITA WEBB : Hadii rajnith ku hadasho Soomaali, waaxda luqadaha, qaybta kaalmada adeegyada, waxbrandon andrade haytusharn areli trivediflyayaz sorto . So [facility] [phone].    ATENCIÓN: Si habla español, tiene a causey disposición servicios gratuitos de asistencia lingüística. Jorge al [phone].    We comply with applicable federal civil rights laws and [place] laws. We do not discriminate on the basis of race, color, national origin, age, disability, sex, sexual orientation, or gender identity.            Thank you!     Thank you for choosing [facility]  for your care. Our goal is always to provide you with excellent care. Hearing back from our patients is one way we can continue to improve our services. Please take a few minutes to complete the written survey that you may receive in the mail after your visit with us. Thank you!             Your Updated Medication List - Protect others around you: Learn how to safely use, store and throw away your medicines at www.disposemymeds.org.          This list is accurate as of 9/25/18  3:06 PM.  Always use your most recent med list.                   Brand Name Dispense Instructions for use Diagnosis    Calcium Carbonate Antacid 1177 MG Chew     84 tablet    Take 1 chew tab by mouth 2 times daily as needed    Gastroesophageal reflux disease, esophagitis presence not specified       fluticasone 50 MCG/ACT spray    FLONASE    16 g    Spray 1-2 sprays into both nostrils daily    Chronic allergic rhinitis due to pollen, unspecified seasonality       ibuprofen 400 MG tablet    ADVIL/MOTRIN    120 tablet    Take 1 tablet (400 mg) by mouth every 6 hours as needed for moderate pain    Dysuria        loratadine 10 MG tablet    CLARITIN    90 tablet    Take 1 tablet (10 mg) by mouth daily    Chronic rhinitis, unspecified type       mirtazapine 45 MG tablet    REMERON    31 tablet    Take 1 tablet (45 mg) by mouth At Bedtime    Adjustment disorder with depressed mood       multivitamin, therapeutic with minerals Tabs tablet     100 tablet    Take 1 tablet by mouth daily    Cachexia (H)       PEDIASURE Liqd     90 each    Take 1 Can by mouth 3 times daily    Cachexia (H)       polyethylene glycol powder    MIRALAX    510 g    Take 17 g (1 capful) by mouth daily    Slow transit constipation       QUEtiapine 25 MG tablet    SEROQUEL    30 tablet    Take 1 tablet (25 mg) by mouth At Bedtime    Adjustment disorder with depressed mood       ranitidine 150 MG tablet    ZANTAC    60 tablet    Take 1 tablet (150 mg) by mouth 2 times daily    Gastroesophageal reflux disease, esophagitis presence not specified

## 2018-09-25 NOTE — LETTER
September 28, 2018      Rupinder Spaulding  [address]  [facility] [address]        Dear Rupinder,    Thank you for getting your care at [facility]. Please see below for your test results. She does not have Strep Throat. She does not need medicine at this time.    Resulted Orders   Strep Culture (GABS)   Result Value Ref Range    Specimen Description Throat     Culture Micro No Beta Streptococcus isolated        If you have any concerns about these results please call and leave a message for me or send a Youjia message to the clinic.    Sincerely,    Evi Smith, DO     [facility]  [phone]  [facility]

## 2018-09-25 NOTE — PATIENT INSTRUCTIONS
Here is the plan from today's visit    1. Streptococcal sore throat  - Strep Screen Rapid (Group) ([facility])    Please call or return to clinic if your symptoms don't go away.    Follow up plan  Please make a clinic appointment for follow up with me (SULY SMITH) as needed for follow up.    Thank you for coming to [facility] today.  Lab Testing:  **If you had lab testing today and your results are reassuring or normal they will be mailed to you or sent through [employer] within 7 days.   **If the lab tests need quick action we will call you with the results.  The phone number we will call with results is # [phone] (home) . If this is not the best number please call our clinic and change the number.  Medication Refills:  If you need any refills please call your pharmacy and they will contact us.   If you need to  your refill at a new pharmacy, please contact the new pharmacy directly. The new pharmacy will help you get your medications transferred faster.   Scheduling:  If you have any concerns about today's visit or wish to schedule another appointment please call our office during normal business hours [phone] (8-5:00 M-F)  If a referral was made to a [facility] and you don't get a call from central scheduling please call [phone].  If a Mammogram was ordered for you at [facility] call [phone] to schedule or change your appointment.  If you had an XRay/CT/Ultrasound/MRI ordered the number is [phone] to schedule or change your radiology appointment.   Medical Concerns:  If you have urgent medical concerns please call [phone] at any time of the day.    Suly Smith, DO

## 2018-09-27 LAB
BACTERIA SPEC CULT: NORMAL
SPECIMEN SOURCE: NORMAL

## 2018-10-10 DIAGNOSIS — F43.21 ADJUSTMENT DISORDER WITH DEPRESSED MOOD: ICD-10-CM

## 2018-10-10 RX ORDER — QUETIAPINE FUMARATE 25 MG/1
25 TABLET, FILM COATED ORAL AT BEDTIME
Qty: 30 TABLET | Refills: 1 | Status: SHIPPED | OUTPATIENT
Start: 2018-10-10 | End: 2018-12-18

## 2018-10-10 NOTE — TELEPHONE ENCOUNTER

## 2018-10-26 ENCOUNTER — OFFICE VISIT (OUTPATIENT)
Dept: FAMILY MEDICINE | Facility: CLINIC | Age: 23
End: 2018-10-26
Payer: COMMERCIAL

## 2018-10-26 VITALS
OXYGEN SATURATION: 99 % | HEART RATE: 96 BPM | DIASTOLIC BLOOD PRESSURE: 75 MMHG | TEMPERATURE: 97.9 F | WEIGHT: 78.8 LBS | SYSTOLIC BLOOD PRESSURE: 110 MMHG | BODY MASS INDEX: 15.47 KG/M2

## 2018-10-26 DIAGNOSIS — E44.0 MODERATE MALNUTRITION (H): Primary | ICD-10-CM

## 2018-10-26 ASSESSMENT — ENCOUNTER SYMPTOMS
COUGH: 0
TROUBLE SWALLOWING: 0
ABDOMINAL PAIN: 0
DYSURIA: 0
WHEEZING: 0
EYE PAIN: 0
FEVER: 0

## 2018-10-26 NOTE — PATIENT INSTRUCTIONS
.Here is the plan from today's visit    1. Moderate malnutrition (H)  Record all food daily    - CBC with Diff Plt ([place])  - Comprehensive Metabolic Panel ([facility])      Please call or return to clinic if your symptoms don't go away.    Follow up plan  Please make a clinic appointment for follow up with me (REUBEN MIXON) in 1  week for 40 min.    Thank you for coming to [facility] today.  Lab Testing:  **If you had lab testing today and your results are reassuring or normal they will be mailed to you or sent through [employer] within 7 days.   **If the lab tests need quick action we will call you with the results.  The phone number we will call with results is # [phone] (home) . If this is not the best number please call our clinic and change the number.  Medication Refills:  If you need any refills please call your pharmacy and they will contact us.   If you need to  your refill at a new pharmacy, please contact the new pharmacy directly. The new pharmacy will help you get your medications transferred faster.   Scheduling:  If you have any concerns about today's visit or wish to schedule another appointment please call our office during normal business hours [phone] (8-5:00 M-F)  If a referral was made to a [facility] and you don't get a call from central scheduling please call [phone].  If a Mammogram was ordered for you at [facility] call [phone] to schedule or change your appointment.  If you had an XRay/CT/Ultrasound/MRI ordered the number is [phone] to schedule or change your radiology appointment.   Medical Concerns:  If you have urgent medical concerns please call [phone] at any time of the day.    Reuben Mixon MD

## 2018-10-26 NOTE — NURSING NOTE
Due to patient being non-English speaking/uses sign language, an  was used for this visit. Only for face-to-face interpretation by an external agency, date and length of interpretation can be found on the scanned worksheet.     name: Abdifatah Ocampo  Agency: [employer]  Language: French   Telephone number: [phone]  Type of interpretation: Face-to-face, spoken
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
## 2018-10-26 NOTE — MR AVS SNAPSHOT
After Visit Summary   10/26/2018    Rupinder Spaulding    MRN: [id number]           Patient Information     Date Of Birth          1995        Visit Information        Provider Department      10/26/2018 8:40 AM Reuben Mixon MD [facility]        Today's Diagnoses     Moderate malnutrition (H)    -  1      Care Instructions    .Here is the plan from today's visit    1. Moderate malnutrition (H)  Record all food daily    - CBC with Diff Plt ([place])  - Comprehensive Metabolic Panel ([facility])      Please call or return to clinic if your symptoms don't go away.    Follow up plan  Please make a clinic appointment for follow up with me (REUBEN MIXON) in 1  week for 40 min.    Thank you for coming to [facility] today.  Lab Testing:  **If you had lab testing today and your results are reassuring or normal they will be mailed to you or sent through [employer] within 7 days.   **If the lab tests need quick action we will call you with the results.  The phone number we will call with results is # [phone] (home) . If this is not the best number please call our clinic and change the number.  Medication Refills:  If you need any refills please call your pharmacy and they will contact us.   If you need to  your refill at a new pharmacy, please contact the new pharmacy directly. The new pharmacy will help you get your medications transferred faster.   Scheduling:  If you have any concerns about today's visit or wish to schedule another appointment please call our office during normal business hours [phone] (8-5:00 M-F)  If a referral was made to a [facility] and you don't get a call from central scheduling please call [phone].  If a Mammogram was ordered for you at [facility] call [phone] to schedule or change your appointment.  If you had an XRay/CT/Ultrasound/MRI ordered the number is [phone] to schedule or change your radiology  appointment.   Medical Concerns:  If you have urgent medical concerns please call [phone] at any time of the day.    Reuben Mixon MD            Follow-ups after your visit        Who to contact     Please call your clinic at [phone] to:    Ask questions about your health    Make or cancel appointments    Discuss your medicines    Learn about your test results    Speak to your doctor            Additional Information About Your Visit        Care EveryWhere ID     This is your Care EveryWhere ID. This could be used by other organizations to access your [place] medical records  [id number]        Your Vitals Were     Pulse Temperature Last Period Pulse Oximetry BMI (Body Mass Index)       96 97.9  F (36.6  C) (Oral) 10/08/2018 (Exact Date) 99% 15.47 kg/m2        Blood Pressure from Last 3 Encounters:   10/26/18 110/75   09/25/18 114/79   08/03/18 109/76    Weight from Last 3 Encounters:   10/26/18 78 lb 12.8 oz (35.7 kg)   09/25/18 80 lb 6.4 oz (36.5 kg)   08/03/18 80 lb 3.2 oz (36.4 kg)              We Performed the Following     CBC with Diff Plt ([place])     Comprehensive Metabolic Panel ([facility])          Today's Medication Changes          These changes are accurate as of 10/26/18  9:57 AM.  If you have any questions, ask your nurse or doctor.               Stop taking these medicines if you haven't already. Please contact your care team if you have questions.     PEDIASURE Liqd   Stopped by:  Reuben Mixon MD                    Primary Care Provider Office Phone # Fax #    Reuben Mixon [phone] [address] [address]        Equal Access to Services     [facility] : Hadrodney Moody, patrica glynn, qaaustin trevizo. So [facility] [phone].    ATENCIÓN: Si habla español, tiene a causey disposición servicios gratuitos de asistencia lingüística. Llame al [phone].    We comply  with applicable federal civil rights laws and [place] laws. We do not discriminate on the basis of race, color, national origin, age, disability, sex, sexual orientation, or gender identity.            Thank you!     Thank you for choosing [facility]  for your care. Our goal is always to provide you with excellent care. Hearing back from our patients is one way we can continue to improve our services. Please take a few minutes to complete the written survey that you may receive in the mail after your visit with us. Thank you!             Your Updated Medication List - Protect others around you: Learn how to safely use, store and throw away your medicines at www.disposemymeds.org.          This list is accurate as of 10/26/18  9:57 AM.  Always use your most recent med list.                   Brand Name Dispense Instructions for use Diagnosis    Calcium Carbonate Antacid 1177 MG Chew     84 tablet    Take 1 chew tab by mouth 2 times daily as needed    Gastroesophageal reflux disease, esophagitis presence not specified       fluticasone 50 MCG/ACT spray    FLONASE    16 g    Spray 1-2 sprays into both nostrils daily    Chronic allergic rhinitis due to pollen, unspecified seasonality       ibuprofen 400 MG tablet    ADVIL/MOTRIN    120 tablet    Take 1 tablet (400 mg) by mouth every 6 hours as needed for moderate pain    Dysuria       loratadine 10 MG tablet    CLARITIN    90 tablet    Take 1 tablet (10 mg) by mouth daily    Chronic rhinitis, unspecified type       mirtazapine 45 MG tablet    REMERON    31 tablet    Take 1 tablet (45 mg) by mouth At Bedtime    Adjustment disorder with depressed mood       multivitamin, therapeutic with minerals Tabs tablet     100 tablet    Take 1 tablet by mouth daily    Cachexia (H)       polyethylene glycol powder    MIRALAX    510 g    Take 17 g (1 capful) by mouth daily    Slow transit constipation       QUEtiapine 25 MG tablet    SEROQUEL    30 tablet     Take 1 tablet (25 mg) by mouth At Bedtime    Adjustment disorder with depressed mood       ranitidine 150 MG tablet    ZANTAC    60 tablet    Take 1 tablet (150 mg) by mouth 2 times daily    Gastroesophageal reflux disease, esophagitis presence not specified

## 2018-11-16 ENCOUNTER — OFFICE VISIT (OUTPATIENT)
Dept: FAMILY MEDICINE | Facility: CLINIC | Age: 23
End: 2018-11-16
Payer: MEDICAID

## 2018-11-16 VITALS
TEMPERATURE: 97.4 F | HEART RATE: 74 BPM | DIASTOLIC BLOOD PRESSURE: 71 MMHG | BODY MASS INDEX: 16.02 KG/M2 | WEIGHT: 81.6 LBS | OXYGEN SATURATION: 100 % | SYSTOLIC BLOOD PRESSURE: 107 MMHG

## 2018-11-16 DIAGNOSIS — E44.0 MODERATE MALNUTRITION (H): Primary | ICD-10-CM

## 2018-11-16 ASSESSMENT — ENCOUNTER SYMPTOMS
FEVER: 0
COUGH: 0
DYSURIA: 0
TROUBLE SWALLOWING: 0
WHEEZING: 0
EYE PAIN: 0
ABDOMINAL PAIN: 0

## 2018-11-16 NOTE — PATIENT INSTRUCTIONS
Here is the plan from today's visit    1. Moderate malnutrition (H)  Continue eating 4-5 meals a day      Please call or return to clinic if your symptoms don't go away.    Follow up plan  Please make a clinic appointment for follow up with me (REUBEN MIXON) in 1-2   weeks for .  Make a 40 min visit for citizen ship testing.   Thank you for coming to [facility] today.  Lab Testing:  **If you had lab testing today and your results are reassuring or normal they will be mailed to you or sent through [employer] within 7 days.   **If the lab tests need quick action we will call you with the results.  The phone number we will call with results is # [phone] (home) . If this is not the best number please call our clinic and change the number.  Medication Refills:  If you need any refills please call your pharmacy and they will contact us.   If you need to  your refill at a new pharmacy, please contact the new pharmacy directly. The new pharmacy will help you get your medications transferred faster.   Scheduling:  If you have any concerns about today's visit or wish to schedule another appointment please call our office during normal business hours [phone] (8-5:00 M-F)  If a referral was made to a [facility] and you don't get a call from central scheduling please call [phone].  If a Mammogram was ordered for you at [facility] call [phone] to schedule or change your appointment.  If you had an XRay/CT/Ultrasound/MRI ordered the number is [phone] to schedule or change your radiology appointment.   Medical Concerns:  If you have urgent medical concerns please call [phone] at any time of the day.    Reuben Mixon MD

## 2018-11-16 NOTE — MR AVS SNAPSHOT
After Visit Summary   11/16/2018    Rupinder Spaulding    MRN: [id number]           Patient Information     Date Of Birth          1995        Visit Information        Provider Department      11/16/2018 3:00 PM Reuben Mixon MD [facility]        Today's Diagnoses     Moderate malnutrition (H)    -  1      Care Instructions    Here is the plan from today's visit    1. Moderate malnutrition (H)  Continue eating 4-5 meals a day      Please call or return to clinic if your symptoms don't go away.    Follow up plan  Please make a clinic appointment for follow up with me (REUBEN MIXON) in 1-2   weeks for .  Make a 40 min visit for citizen ship testing.   Thank you for coming to [facility] today.  Lab Testing:  **If you had lab testing today and your results are reassuring or normal they will be mailed to you or sent through [employer] within 7 days.   **If the lab tests need quick action we will call you with the results.  The phone number we will call with results is # [phone] (home) . If this is not the best number please call our clinic and change the number.  Medication Refills:  If you need any refills please call your pharmacy and they will contact us.   If you need to  your refill at a new pharmacy, please contact the new pharmacy directly. The new pharmacy will help you get your medications transferred faster.   Scheduling:  If you have any concerns about today's visit or wish to schedule another appointment please call our office during normal business hours [phone] (8-5:00 M-F)  If a referral was made to a [facility] and you don't get a call from central scheduling please call [phone].  If a Mammogram was ordered for you at [facility] call [phone] to schedule or change your appointment.  If you had an XRay/CT/Ultrasound/MRI ordered the number is [phone] to schedule or change your radiology appointment.   Medical  Concerns:  If you have urgent medical concerns please call [phone] at any time of the day.    Reuben Mixon MD            Follow-ups after your visit        Follow-up notes from your care team     Return in about 2 weeks (around 11/30/2018) for weight check.      Who to contact     Please call your clinic at [phone] to:    Ask questions about your health    Make or cancel appointments    Discuss your medicines    Learn about your test results    Speak to your doctor            Additional Information About Your Visit        Care EveryWhere ID     This is your Care EveryWhere ID. This could be used by other organizations to access your [place] medical records  [id number]        Your Vitals Were     Pulse Temperature Last Period Pulse Oximetry BMI (Body Mass Index)       74 97.4  F (36.3  C) (Oral) 11/15/2018 100% 16.02 kg/m2        Blood Pressure from Last 3 Encounters:   11/16/18 107/71   10/26/18 110/75   09/25/18 114/79    Weight from Last 3 Encounters:   11/16/18 81 lb 9.6 oz (37 kg)   10/26/18 78 lb 12.8 oz (35.7 kg)   09/25/18 80 lb 6.4 oz (36.5 kg)              Today, you had the following     No orders found for display       Primary Care Provider Office Phone # Fax #    Reuben Mixon [phone] [address] [address]        Equal Access to Services     [facility] : Hadii ranjith Moody, waaxda renard, qaybta kaalaustin malagon . So [facility] [phone].    ATENCIÓN: Si habla español, tiene a causey disposición servicios gratuitos de asistencia lingüística. Jorge al [phone].    We comply with applicable federal civil rights laws and [place] laws. We do not discriminate on the basis of race, color, national origin, age, disability, sex, sexual orientation, or gender identity.            Thank you!     Thank you for choosing \[facility]\"" [facility]  for your care. Our goal is always to  provide you with excellent care. Hearing back from our patients is one way we can continue to improve our services. Please take a few minutes to complete the written survey that you may receive in the mail after your visit with us. Thank you!             Your Updated Medication List - Protect others around you: Learn how to safely use, store and throw away your medicines at www.disposemymeds.org.          This list is accurate as of 11/16/18  4:00 PM.  Always use your most recent med list.                   Brand Name Dispense Instructions for use Diagnosis    Calcium Carbonate Antacid 1177 MG Chew     84 tablet    Take 1 chew tab by mouth 2 times daily as needed    Gastroesophageal reflux disease, esophagitis presence not specified       fluticasone 50 MCG/ACT spray    FLONASE    16 g    Spray 1-2 sprays into both nostrils daily    Chronic allergic rhinitis due to pollen, unspecified seasonality       ibuprofen 400 MG tablet    ADVIL/MOTRIN    120 tablet    Take 1 tablet (400 mg) by mouth every 6 hours as needed for moderate pain    Dysuria       loratadine 10 MG tablet    CLARITIN    90 tablet    Take 1 tablet (10 mg) by mouth daily    Chronic rhinitis, unspecified type       mirtazapine 45 MG tablet    REMERON    31 tablet    Take 1 tablet (45 mg) by mouth At Bedtime    Adjustment disorder with depressed mood       multivitamin, therapeutic with minerals Tabs tablet     100 tablet    Take 1 tablet by mouth daily    Cachexia (H)       polyethylene glycol powder    MIRALAX    510 g    Take 17 g (1 capful) by mouth daily    Slow transit constipation       QUEtiapine 25 MG tablet    SEROQUEL    30 tablet    Take 1 tablet (25 mg) by mouth At Bedtime    Adjustment disorder with depressed mood       ranitidine 150 MG tablet    ZANTAC    60 tablet    Take 1 tablet (150 mg) by mouth 2 times daily    Gastroesophageal reflux disease, esophagitis presence not specified

## 2018-11-16 NOTE — NURSING NOTE
Due to patient being non-English speaking/uses sign language, an  was used for this visit. Only for face-to-face interpretation by an external agency, date and length of interpretation can be found on the scanned worksheet.     name: Abdifatah Ocampo  Agency: [employer]  Language: Uruguayan   Telephone number: [phone]  Type of interpretation: Face-to-face, spoken

## 2018-12-17 NOTE — PROGRESS NOTES
MARTY       Rupinder Spaulding is a 23 year old  who presents for   Chief Complaint   Patient presents with     Weight Check     Weight Follow up      Refill Request     multi vit,remeron, seroquil        Cachexia Follow up  Wt Readings from Last 4 Encounters:   12/18/18 38 kg (83 lb 12.8 oz)   11/16/18 37 kg (81 lb 9.6 oz)   10/26/18 35.7 kg (78 lb 12.8 oz)   09/25/18 36.5 kg (80 lb 6.4 oz)     Has gained weight , feeling happier,  Doing some walking. Less depressed      A [employer]  was used for  this visit.    +++++++  Requests refills    Problem, Medication and Allergy Lists were reviewed and updated if needed..    Patient is an established patient of this clinic..         Review of Systems:   Review of Systems   Constitutional: Negative for fever.   HENT: Negative for trouble swallowing.    Eyes: Negative for pain.   Respiratory: Negative for cough and wheezing.    Cardiovascular: Negative for chest pain.   Gastrointestinal: Negative for abdominal pain.   Genitourinary: Negative for dysuria.            Physical Exam:     Vitals:    12/18/18 0927   BP: 108/74   Pulse: 74   Resp: 20   Temp: 97.5  F (36.4  C)   SpO2: 100%   Weight: 38 kg (83 lb 12.8 oz)     Body mass index is 16.45 kg/m .  Vitals were reviewed and were normal     Physical Exam   Constitutional: She is oriented to person, place, and time. She appears well-developed and well-nourished. No distress.   Cardiovascular: Normal rate.   Neurological: She is alert and oriented to person, place, and time.   Skin: Skin is warm and dry. She is not diaphoretic.   Psychiatric: She has a normal mood and affect. Her behavior is normal. Thought content normal.   Nursing note and vitals reviewed.        Results:   No testing ordered today    Assessment and Plan        1. Cachexia (H)  Improving, continue increased diet -will follow closely  - multivitamin w/minerals (MULTI-VITAMIN) tablet; Take 1 tablet by mouth daily  Dispense: 100 tablet; Refill: 3    2.  Gastroesophageal reflux disease, esophagitis presence not specified    - Calcium Carbonate Antacid 1177 MG CHEW; Take 1 tablet (1,177 mg) by mouth 2 times daily as needed (Reflux)  Dispense: 84 tablet; Refill: 3  - ranitidine (ZANTAC) 150 MG tablet; Take 1 tablet (150 mg) by mouth 2 times daily  Dispense: 60 tablet; Refill: 11    3. Chronic allergic rhinitis due to pollen    - fluticasone (FLONASE) 50 MCG/ACT nasal spray; Spray 1-2 sprays into both nostrils daily  Dispense: 16 g; Refill: 3    4. Chronic rhinitis    - loratadine (CLARITIN) 10 MG tablet; Take 1 tablet (10 mg) by mouth daily  Dispense: 90 tablet; Refill: 1    5. Adjustment disorder with depressed mood    - mirtazapine (REMERON) 45 MG tablet; Take 1 tablet (45 mg) by mouth At Bedtime  Dispense: 31 tablet; Refill: 11  - QUEtiapine (SEROQUEL) 25 MG tablet; Take 1 tablet (25 mg) by mouth At Bedtime  Dispense: 30 tablet; Refill: 1    6. Slow transit constipation    - polyethylene glycol (MIRALAX) powder; Take 17 g (1 capful) by mouth daily  Dispense: 510 g; Refill: 11    7. Tension headache    - ibuprofen (ADVIL/MOTRIN) 400 MG tablet; Take 1 tablet (400 mg) by mouth every 6 hours as needed for moderate pain  Dispense: 120 tablet; Refill: 3    8. Upper back strain, initial encounter  Home exercises  - PHYSICAL THERAPY REFERRAL - EXTERNAL; Future           Medications Discontinued During This Encounter   Medication Reason     Calcium Carbonate Antacid 1177 MG CHEW Reorder     fluticasone (FLONASE) 50 MCG/ACT spray Reorder     ibuprofen (ADVIL/MOTRIN) 400 MG tablet Reorder     loratadine (CLARITIN) 10 MG tablet Reorder     mirtazapine (REMERON) 45 MG tablet Reorder     multivitamin, therapeutic with minerals (MULTI-VITAMIN) TABS tablet Reorder     polyethylene glycol (MIRALAX) powder Reorder     QUEtiapine (SEROQUEL) 25 MG tablet Reorder     ranitidine (ZANTAC) 150 MG tablet Reorder       Options for treatment and follow-up care were reviewed with the patient.  Rupinder Spaulding  engaged in the decision making process and verbalized understanding of the options discussed and agreed with the final plan.    Reuben Mixon MD

## 2018-12-18 ENCOUNTER — OFFICE VISIT (OUTPATIENT)
Dept: FAMILY MEDICINE | Facility: CLINIC | Age: 23
End: 2018-12-18
Payer: MEDICAID

## 2018-12-18 VITALS
DIASTOLIC BLOOD PRESSURE: 74 MMHG | WEIGHT: 83.8 LBS | TEMPERATURE: 97.5 F | BODY MASS INDEX: 16.45 KG/M2 | OXYGEN SATURATION: 100 % | RESPIRATION RATE: 20 BRPM | SYSTOLIC BLOOD PRESSURE: 108 MMHG | HEART RATE: 74 BPM

## 2018-12-18 DIAGNOSIS — K59.01 SLOW TRANSIT CONSTIPATION: ICD-10-CM

## 2018-12-18 DIAGNOSIS — J30.1 CHRONIC ALLERGIC RHINITIS DUE TO POLLEN: ICD-10-CM

## 2018-12-18 DIAGNOSIS — F43.21 ADJUSTMENT DISORDER WITH DEPRESSED MOOD: ICD-10-CM

## 2018-12-18 DIAGNOSIS — K21.9 GASTROESOPHAGEAL REFLUX DISEASE, ESOPHAGITIS PRESENCE NOT SPECIFIED: ICD-10-CM

## 2018-12-18 DIAGNOSIS — S29.012A UPPER BACK STRAIN, INITIAL ENCOUNTER: ICD-10-CM

## 2018-12-18 DIAGNOSIS — G44.209 TENSION HEADACHE: Primary | ICD-10-CM

## 2018-12-18 DIAGNOSIS — R64 CACHEXIA (H): ICD-10-CM

## 2018-12-18 DIAGNOSIS — J31.0 CHRONIC RHINITIS: ICD-10-CM

## 2018-12-18 RX ORDER — POLYETHYLENE GLYCOL 3350 17 G/17G
1 POWDER, FOR SOLUTION ORAL DAILY
Qty: 510 G | Refills: 11 | Status: SHIPPED | OUTPATIENT
Start: 2018-12-18 | End: 2019-03-29

## 2018-12-18 RX ORDER — IBUPROFEN 400 MG/1
400 TABLET, FILM COATED ORAL EVERY 6 HOURS PRN
Qty: 120 TABLET | Refills: 3 | Status: SHIPPED | OUTPATIENT
Start: 2018-12-18 | End: 2019-01-02

## 2018-12-18 RX ORDER — MIRTAZAPINE 45 MG/1
45 TABLET, FILM COATED ORAL AT BEDTIME
Qty: 31 TABLET | Refills: 11 | Status: SHIPPED | OUTPATIENT
Start: 2018-12-18 | End: 2019-03-29

## 2018-12-18 RX ORDER — LORATADINE 10 MG/1
10 TABLET ORAL DAILY
Qty: 90 TABLET | Refills: 1 | Status: SHIPPED | OUTPATIENT
Start: 2018-12-18 | End: 2019-03-29

## 2018-12-18 RX ORDER — QUETIAPINE FUMARATE 25 MG/1
25 TABLET, FILM COATED ORAL AT BEDTIME
Qty: 30 TABLET | Refills: 1 | Status: SHIPPED | OUTPATIENT
Start: 2018-12-18 | End: 2019-01-25

## 2018-12-18 RX ORDER — MULTIPLE VITAMINS W/ MINERALS TAB 9MG-400MCG
1 TAB ORAL DAILY
Qty: 100 TABLET | Refills: 3 | Status: SHIPPED | OUTPATIENT
Start: 2018-12-18 | End: 2019-03-29

## 2018-12-18 RX ORDER — FLUTICASONE PROPIONATE 50 MCG
1-2 SPRAY, SUSPENSION (ML) NASAL DAILY
Qty: 16 G | Refills: 3 | Status: SHIPPED | OUTPATIENT
Start: 2018-12-18 | End: 2019-11-01

## 2018-12-18 ASSESSMENT — ENCOUNTER SYMPTOMS
TROUBLE SWALLOWING: 0
ABDOMINAL PAIN: 0
FEVER: 0
DYSURIA: 0
EYE PAIN: 0
COUGH: 0
WHEEZING: 0

## 2018-12-18 NOTE — PATIENT INSTRUCTIONS
Here is the plan from today's visit    1. Cachexia (H)  Improving, continue increased diet -will follow closely  - multivitamin w/minerals (MULTI-VITAMIN) tablet; Take 1 tablet by mouth daily  Dispense: 100 tablet; Refill: 3    2. Gastroesophageal reflux disease, esophagitis presence not specified    - Calcium Carbonate Antacid 1177 MG CHEW; Take 1 tablet (1,177 mg) by mouth 2 times daily as needed (Reflux)  Dispense: 84 tablet; Refill: 3  - ranitidine (ZANTAC) 150 MG tablet; Take 1 tablet (150 mg) by mouth 2 times daily  Dispense: 60 tablet; Refill: 11    3. Chronic allergic rhinitis due to pollen    - fluticasone (FLONASE) 50 MCG/ACT nasal spray; Spray 1-2 sprays into both nostrils daily  Dispense: 16 g; Refill: 3    4. Chronic rhinitis    - loratadine (CLARITIN) 10 MG tablet; Take 1 tablet (10 mg) by mouth daily  Dispense: 90 tablet; Refill: 1    5. Adjustment disorder with depressed mood    - mirtazapine (REMERON) 45 MG tablet; Take 1 tablet (45 mg) by mouth At Bedtime  Dispense: 31 tablet; Refill: 11  - QUEtiapine (SEROQUEL) 25 MG tablet; Take 1 tablet (25 mg) by mouth At Bedtime  Dispense: 30 tablet; Refill: 1    6. Slow transit constipation    - polyethylene glycol (MIRALAX) powder; Take 17 g (1 capful) by mouth daily  Dispense: 510 g; Refill: 11    7. Tension headache    - ibuprofen (ADVIL/MOTRIN) 400 MG tablet; Take 1 tablet (400 mg) by mouth every 6 hours as needed for moderate pain  Dispense: 120 tablet; Refill: 3    8. Upper back strain, initial encounter  Home exercises  - PHYSICAL THERAPY REFERRAL - EXTERNAL; Future      Please call or return to clinic if your symptoms don't go away.    Follow up plan  Please make a clinic appointment for follow up with me (JOSE STAHL) in 2-4  weeks for weight check.    Thank you for coming to [facility] today.  Lab Testing:  **If you had lab testing today and your results are reassuring or normal they will be mailed to you or sent through [employer] within 7  days.   **If the lab tests need quick action we will call you with the results.  The phone number we will call with results is # [phone] (home) . If this is not the best number please call our clinic and change the number.  Medication Refills:  If you need any refills please call your pharmacy and they will contact us.   If you need to  your refill at a new pharmacy, please contact the new pharmacy directly. The new pharmacy will help you get your medications transferred faster.   Scheduling:  If you have any concerns about today's visit or wish to schedule another appointment please call our office during normal business hours [phone] (8-5:00 M-F)  If a referral was made to a [facility] and you don't get a call from central scheduling please call [phone].  If a Mammogram was ordered for you at [facility] call [phone] to schedule or change your appointment.  If you had an XRay/CT/Ultrasound/MRI ordered the number is [phone] to schedule or change your radiology appointment.   Medical Concerns:  If you have urgent medical concerns please call [phone] at any time of the day.    Reuben Mixon MD

## 2018-12-18 NOTE — NURSING NOTE
Due to patient being non-English speaking/uses sign language, an  was used for this visit. Only for face-to-face interpretation by an external agency, date and length of interpretation can be found on the scanned worksheet.     name:  name: Abdifatah Ocampo    Agency: [employer]  Language: Stateless   Telephone number: [phone]  Type of interpretation: Face-to-face, spoken

## 2019-01-02 ENCOUNTER — OFFICE VISIT (OUTPATIENT)
Dept: FAMILY MEDICINE | Facility: CLINIC | Age: 24
End: 2019-01-02
Payer: MEDICAID

## 2019-01-02 VITALS
TEMPERATURE: 98.2 F | SYSTOLIC BLOOD PRESSURE: 108 MMHG | OXYGEN SATURATION: 100 % | BODY MASS INDEX: 17.08 KG/M2 | DIASTOLIC BLOOD PRESSURE: 78 MMHG | HEART RATE: 82 BPM | WEIGHT: 87 LBS

## 2019-01-02 DIAGNOSIS — T78.3XXA ANGIOEDEMA, INITIAL ENCOUNTER: Primary | ICD-10-CM

## 2019-01-02 LAB
% GRANULOCYTES: 52.4 %G (ref 40–75)
BUN SERPL-MCNC: 9 MG/DL (ref 7–19)
C4 SERPL-MCNC: 23 MG/DL (ref 15–50)
CALCIUM SERPL-MCNC: 9.1 MG/DL (ref 8.5–10.1)
CHLORIDE SERPLBLD-SCNC: 98.5 MMOL/L (ref 98–110)
CO2 SERPL-SCNC: 28.1 MMOL/L (ref 20–32)
CREAT SERPL-MCNC: 0.5 MG/DL (ref 0.5–1)
ERYTHROCYTE [SEDIMENTATION RATE] IN BLOOD: 8 MM/HR (ref 0–20)
GFR SERPL CREATININE-BSD FRML MDRD: >90 ML/MIN/1.7 M2
GLUCOSE SERPL-MCNC: 92.6 MG'DL (ref 70–99)
GRANULOCYTES #: 2.7 K/UL (ref 1.6–8.3)
HCT VFR BLD AUTO: 43.8 % (ref 35–47)
HEMOGLOBIN: 13.1 G/DL (ref 11.7–15.7)
LYMPHOCYTES # BLD AUTO: 2 K/UL (ref 0.8–5.3)
LYMPHOCYTES NFR BLD AUTO: 38.6 %L (ref 20–48)
MCH RBC QN AUTO: 26.5 PG (ref 26.5–35)
MCHC RBC AUTO-ENTMCNC: 29.9 G/DL (ref 32–36)
MCV RBC AUTO: 88.5 FL (ref 78–100)
MID #: 0.5 K/UL (ref 0–2.2)
MID %: 9 %M (ref 0–20)
PLATELET # BLD AUTO: 245 K/UL (ref 150–450)
POTASSIUM SERPL-SCNC: 4.5 MMOL/DL (ref 3.3–4.5)
RBC # BLD AUTO: 4.95 M/UL (ref 3.8–5.2)
SODIUM SERPL-SCNC: 136.6 MMOL/L (ref 132.6–141.4)
WBC # BLD AUTO: 5.2 K/UL (ref 4–11)

## 2019-01-02 RX ORDER — PREDNISONE 20 MG/1
20 TABLET ORAL DAILY
Qty: 5 TABLET | Refills: 0 | Status: SHIPPED | OUTPATIENT
Start: 2019-01-02 | End: 2019-03-29

## 2019-01-02 RX ORDER — MUPIROCIN 20 MG/G
OINTMENT TOPICAL 3 TIMES DAILY
Qty: 1 G | Refills: 0 | Status: SHIPPED | OUTPATIENT
Start: 2019-01-02 | End: 2019-04-03

## 2019-01-02 ASSESSMENT — ENCOUNTER SYMPTOMS
SHORTNESS OF BREATH: 0
ABDOMINAL DISTENTION: 0
CHILLS: 0
TROUBLE SWALLOWING: 0
SORE THROAT: 0
FEVER: 0
WHEEZING: 0
PALPITATIONS: 1

## 2019-01-02 NOTE — NURSING NOTE
Due to patient being non-English speaking/uses sign language, an  was used for this visit. Only for face-to-face interpretation by an external agency, date and length of interpretation can be found on the scanned worksheet.     name:  name: Abdifatah Ocampo    Agency: [employer]  Language: British   Telephone number: [phone]  Type of interpretation: Face-to-face, spoken

## 2019-01-02 NOTE — LETTER
January 2, 2019      Rupinder Spaulding  [address]  [place] [address]        Dear Rupinder,    Thank you for getting your care at [facility]. Please see below for your test results. Results are normal    Resulted Orders   CBC with Diff Plt ([facility])   Result Value Ref Range    WBC 5.2 4.0 - 11.0 K/uL    Lymphocytes # 2.0 0.8 - 5.3 K/uL    % Lymphocytes 38.6 20.0 - 48.0 %L    Mid # 0.5 0.0 - 2.2 K/uL    Mid % 9.0 0.0 - 20.0 %M    GRANULOCYTES # 2.7 1.6 - 8.3 K/uL    % Granulocytes 52.4 40.0 - 75.0 %G    RBC 4.95 3.80 - 5.20 M/uL    Hemoglobin 13.1 11.7 - 15.7 g/dL    Hematocrit 43.8 35.0 - 47.0 %    MCV 88.5 78.0 - 100.0 fL    MCH 26.5 26.5 - 35.0 pg    MCHC 29.9 (L) 32.0 - 36.0 g/dL    Platelets 245.0 150.0 - 450.0 K/uL   Basic Metabolic Panel ([facility])   Result Value Ref Range    Urea Nitrogen 9.0 7.0 - 19.0 mg/dL    Calcium 9.1 8.5 - 10.1 mg/dL    Chloride 98.5 98.0 - 110.0 mmol/L    Carbon Dioxide 28.1 20.0 - 32.0 mmol/L    Creatinine 0.5 0.5 - 1.0 mg/dL    Glucose 92.6 70.0 - 99.0 mg'dL    Potassium 4.5 3.3 - 4.5 mmol/dL    Sodium 136.6 132.6 - 141.4 mmol/L    GFR Estimate >90 >60.0 mL/min/1.7 m2    GFR Estimate If Black >90 >60.0 mL/min/1.7 m2   Erythrocyte Sedimentation Rate ([facility])   Result Value Ref Range    Sed Rate 8 0 - 20 mm/hr   Complement C4   Result Value Ref Range    Complement C4 23 15 - 50 mg/dL       If you have any concerns about these results please call and leave a message for me or send a MyChart message to the clinic.    Sincerely,    Raymundo Rivera, DO

## 2019-01-11 NOTE — PROGRESS NOTES
Preceptor Attestation:   Patient seen, evaluated and discussed with the resident. I have verified the content of the note, which accurately reflects my assessment of the patient and the plan of care.   Supervising Physician:  Oscar Pedroza MD

## 2019-01-25 ENCOUNTER — OFFICE VISIT (OUTPATIENT)
Dept: FAMILY MEDICINE | Facility: CLINIC | Age: 24
End: 2019-01-25
Payer: MEDICAID

## 2019-01-25 VITALS
HEART RATE: 84 BPM | DIASTOLIC BLOOD PRESSURE: 71 MMHG | WEIGHT: 80.2 LBS | OXYGEN SATURATION: 98 % | HEIGHT: 59 IN | BODY MASS INDEX: 16.17 KG/M2 | SYSTOLIC BLOOD PRESSURE: 98 MMHG | TEMPERATURE: 97.9 F

## 2019-01-25 DIAGNOSIS — Z00.00 HEALTHCARE MAINTENANCE: ICD-10-CM

## 2019-01-25 DIAGNOSIS — S00.521A BLISTER OF LIP WITH INFECTION, INITIAL ENCOUNTER: ICD-10-CM

## 2019-01-25 DIAGNOSIS — L08.9 BLISTER OF LIP WITH INFECTION, INITIAL ENCOUNTER: ICD-10-CM

## 2019-01-25 DIAGNOSIS — E44.0 MODERATE MALNUTRITION (H): Primary | ICD-10-CM

## 2019-01-25 DIAGNOSIS — F43.21 ADJUSTMENT DISORDER WITH DEPRESSED MOOD: ICD-10-CM

## 2019-01-25 RX ORDER — QUETIAPINE FUMARATE 25 MG/1
25 TABLET, FILM COATED ORAL AT BEDTIME
Qty: 90 TABLET | Refills: 3 | Status: SHIPPED | OUTPATIENT
Start: 2019-01-25 | End: 2019-03-25

## 2019-01-25 RX ORDER — MUPIROCIN CALCIUM 20 MG/G
CREAM TOPICAL 3 TIMES DAILY
Qty: 15 G | Refills: 1 | Status: SHIPPED | OUTPATIENT
Start: 2019-01-25 | End: 2019-02-04

## 2019-01-25 ASSESSMENT — ENCOUNTER SYMPTOMS
EYE PAIN: 0
WHEEZING: 0
FEVER: 0
ABDOMINAL PAIN: 0
TROUBLE SWALLOWING: 0
DYSURIA: 0
COUGH: 0

## 2019-01-25 ASSESSMENT — MIFFLIN-ST. JEOR: SCORE: 1016.47

## 2019-01-25 NOTE — PROGRESS NOTES
Rupinder is a 24 year old  who presents for   Patient presents with:  Weight Check      Assessment and Plan          1. Moderate malnutrition (H)  please return in 4 weeks  Increase meals to 5X a day    2. Blister of lip with infection, initial encounter  Use as needed on lips  - mupirocin (BACTROBAN) 2 % external cream; Apply topically 3 times daily for 7 days  Dispense: 15 g; Refill: 1    3. Adjustment disorder with depressed mood  Continue for mood.  - QUEtiapine (SEROQUEL) 25 MG tablet; Take 1 tablet (25 mg) by mouth At Bedtime  Dispense: 90 tablet; Refill: 3    Return in about 4 weeks (around 2/22/2019) for Weight Check..  Please call or return to clinic if your symptoms don't go away.           Medications Discontinued During This Encounter   Medication Reason     QUEtiapine (SEROQUEL) 25 MG tablet        Options for treatment and follow-up care were reviewed with the patient. Rupinder Spaulding  engaged in the decision making process and verbalized understanding of the options discussed and agreed with the final plan.    Reuben Mixon MD         HPI       Rupinder is a 24 year old  who presents for   Patient presents with:  Weight Check      Visit [place]  1. Cachexia  Mother reports that she had a bad cold and her appetite has decreased, reviewed medications. Reports 4 meals a day and two are ensure. Eating eggs, injera, salad.       A Tajik  was used for  this visit.    +++++++    Problem, Medication and Allergy Lists were reviewed and updated if needed..    Patient is an established patient of this clinic..    Health Maintenance  Health Maintenance Due   Topic Date Due     HPV IMMUNIZATION (1 - Female 3-dose series) 01/01/2007     DEPRESSION ACTION PLAN  01/01/2014     INFLUENZA VACCINE (1) 09/01/2018     PHQ-9 Q6 MONTHS  12/04/2018            Review of Systems:   Review of Systems   Constitutional: Negative for fever.   HENT: Negative for trouble swallowing.    Eyes: Negative for pain.   Respiratory:  "Negative for cough and wheezing.    Cardiovascular: Negative for chest pain.   Gastrointestinal: Negative for abdominal pain.   Genitourinary: Negative for dysuria.            Physical Exam:     Vitals:    01/25/19 1033   BP: 98/71   Pulse: 84   Temp: 97.9  F (36.6  C)   TempSrc: Oral   SpO2: 98%   Weight: 36.4 kg (80 lb 3.2 oz)   Height: 1.486 m (4' 10.5\")     Body mass index is 16.48 kg/m .  Vitals were reviewed and were normal     Physical Exam   Constitutional: She is oriented to person, place, and time. She appears well-developed. No distress.   HENT:   Head: Normocephalic.   Eyes: Conjunctivae are normal. No scleral icterus.   Neck: Normal range of motion. No thyromegaly present.   Cardiovascular: Normal rate, regular rhythm and normal heart sounds.   No murmur heard.  Pulmonary/Chest: Effort normal and breath sounds normal. No respiratory distress. She has no wheezes.   Abdominal: Soft. Bowel sounds are normal. She exhibits no distension. There is no splenomegaly or hepatomegaly. There is no tenderness.   Musculoskeletal: She exhibits no edema.   Lymphadenopathy:     She has no cervical adenopathy.   Neurological: She is alert and oriented to person, place, and time.   Skin: Skin is warm and dry. She is not diaphoretic.   Psychiatric: She has a normal mood and affect. Her behavior is normal. Judgment and thought content normal.   Vitals reviewed.        Results:      Results from this visit  Results for orders placed or performed in visit on 01/02/19   CBC with Diff Plt ([facility])   Result Value Ref Range    WBC 5.2 4.0 - 11.0 K/uL    Lymphocytes # 2.0 0.8 - 5.3 K/uL    % Lymphocytes 38.6 20.0 - 48.0 %L    Mid # 0.5 0.0 - 2.2 K/uL    Mid % 9.0 0.0 - 20.0 %M    GRANULOCYTES # 2.7 1.6 - 8.3 K/uL    % Granulocytes 52.4 40.0 - 75.0 %G    RBC 4.95 3.80 - 5.20 M/uL    Hemoglobin 13.1 11.7 - 15.7 g/dL    Hematocrit 43.8 35.0 - 47.0 %    MCV 88.5 78.0 - 100.0 fL    MCH 26.5 26.5 - 35.0 pg    MCHC 29.9 (L) 32.0 - 36.0 " g/dL    Platelets 245.0 150.0 - 450.0 K/uL   Basic Metabolic Panel ([facility])   Result Value Ref Range    Urea Nitrogen 9.0 7.0 - 19.0 mg/dL    Calcium 9.1 8.5 - 10.1 mg/dL    Chloride 98.5 98.0 - 110.0 mmol/L    Carbon Dioxide 28.1 20.0 - 32.0 mmol/L    Creatinine 0.5 0.5 - 1.0 mg/dL    Glucose 92.6 70.0 - 99.0 mg'dL    Potassium 4.5 3.3 - 4.5 mmol/dL    Sodium 136.6 132.6 - 141.4 mmol/L    GFR Estimate >90 >60.0 mL/min/1.7 m2    GFR Estimate If Black >90 >60.0 mL/min/1.7 m2   Erythrocyte Sedimentation Rate ([facility])   Result Value Ref Range    Sed Rate 8 0 - 20 mm/hr   Complement C4   Result Value Ref Range    Complement C4 23 15 - 50 mg/dL

## 2019-01-25 NOTE — PATIENT INSTRUCTIONS
Here is the plan from today's visit    1. Moderate malnutrition (H)  pleae return in 4 weeks    2. Blister of lip with infection, initial encounter  Use as needed on lips  - mupirocin (BACTROBAN) 2 % external cream; Apply topically 3 times daily for 7 days  Dispense: 15 g; Refill: 1    3. Adjustment disorder with depressed mood  Continue for mood.  - QUEtiapine (SEROQUEL) 25 MG tablet; Take 1 tablet (25 mg) by mouth At Bedtime  Dispense: 90 tablet; Refill: 3      Please call or return to clinic if your symptoms don't go away.    Follow up plan      Thank you for coming to [facility] today.  Lab Testing:  **If you had lab testing today and your results are reassuring or normal they will be mailed to you or sent through [employer] within 7 days.   **If the lab tests need quick action we will call you with the results.  The phone number we will call with results is # [phone] (home) . If this is not the best number please call our clinic and change the number.  Medication Refills:  If you need any refills please call your pharmacy and they will contact us.   If you need to  your refill at a new pharmacy, please contact the new pharmacy directly. The new pharmacy will help you get your medications transferred faster.   Scheduling:  If you have any concerns about today's visit or wish to schedule another appointment please call our office during normal business hours [phone] (8-5:00 M-F)  If a referral was made to a [facility] and you don't get a call from central scheduling please call [phone].  If a Mammogram was ordered for you at [facility] call [phone] to schedule or change your appointment.  If you had an XRay/CT/Ultrasound/MRI ordered the number is [phone] to schedule or change your radiology appointment.   Medical Concerns:  If you have urgent medical concerns please call [phone] at any time of the day.    Reuben Mixon MD

## 2019-01-25 NOTE — NURSING NOTE
Due to patient being non-English speaking/uses sign language, an  was used for this visit. Only for face-to-face interpretation by an external agency, date and length of interpretation can be found on the scanned worksheet.     name: magaly Montes  Agency: [employer]  Language: [place] citizen   Telephone number: [phone]  Type of interpretation: Face-to-face, spoken

## 2019-02-01 ENCOUNTER — TELEPHONE (OUTPATIENT)
Dept: FAMILY MEDICINE | Facility: CLINIC | Age: 24
End: 2019-02-01

## 2019-02-01 DIAGNOSIS — L08.9 LOCAL INFECTION OF SKIN AND SUBCUTANEOUS TISSUE: Primary | ICD-10-CM

## 2019-02-01 NOTE — TELEPHONE ENCOUNTER
Prior Authorization Retail Medication Request    Medication/Dose: Mupirocin key # [id number]  ICD code (if different than what is on RX):    Previously Tried and Failed:    Rationale:      Insurance Name:  Medicaid  Insurance ID:        Pharmacy Information (if different than what is on RX)  Name:  [facility]  Phone:

## 2019-02-04 RX ORDER — BACITRACIN ZINC 500 [USP'U]/G
OINTMENT TOPICAL 2 TIMES DAILY
Qty: 15 G | Refills: 0 | Status: SHIPPED | OUTPATIENT
Start: 2019-02-04 | End: 2019-03-29

## 2019-03-25 DIAGNOSIS — F43.21 ADJUSTMENT DISORDER WITH DEPRESSED MOOD: ICD-10-CM

## 2019-03-25 RX ORDER — QUETIAPINE FUMARATE 25 MG/1
25 TABLET, FILM COATED ORAL AT BEDTIME
Qty: 90 TABLET | Refills: 3 | Status: SHIPPED | OUTPATIENT
Start: 2019-03-25 | End: 2019-03-29

## 2019-03-25 NOTE — TELEPHONE ENCOUNTER

## 2019-03-29 ENCOUNTER — OFFICE VISIT (OUTPATIENT)
Dept: FAMILY MEDICINE | Facility: CLINIC | Age: 24
End: 2019-03-29
Payer: MEDICAID

## 2019-03-29 VITALS
OXYGEN SATURATION: 100 % | WEIGHT: 77 LBS | SYSTOLIC BLOOD PRESSURE: 102 MMHG | HEIGHT: 59 IN | HEART RATE: 60 BPM | TEMPERATURE: 98.2 F | DIASTOLIC BLOOD PRESSURE: 69 MMHG | BODY MASS INDEX: 15.52 KG/M2

## 2019-03-29 DIAGNOSIS — F84.0 AUTISM SPECTRUM: ICD-10-CM

## 2019-03-29 DIAGNOSIS — K59.01 SLOW TRANSIT CONSTIPATION: ICD-10-CM

## 2019-03-29 DIAGNOSIS — F43.21 ADJUSTMENT DISORDER WITH DEPRESSED MOOD: ICD-10-CM

## 2019-03-29 DIAGNOSIS — N94.6 MENSTRUAL CRAMPS: ICD-10-CM

## 2019-03-29 DIAGNOSIS — R64 CACHEXIA (H): ICD-10-CM

## 2019-03-29 DIAGNOSIS — J31.0 CHRONIC RHINITIS: ICD-10-CM

## 2019-03-29 DIAGNOSIS — K21.00 GASTROESOPHAGEAL REFLUX DISEASE WITH ESOPHAGITIS: ICD-10-CM

## 2019-03-29 DIAGNOSIS — E44.0 MODERATE MALNUTRITION (H): ICD-10-CM

## 2019-03-29 DIAGNOSIS — K21.9 GASTROESOPHAGEAL REFLUX DISEASE, ESOPHAGITIS PRESENCE NOT SPECIFIED: ICD-10-CM

## 2019-03-29 DIAGNOSIS — J02.9 SORE THROAT: Primary | ICD-10-CM

## 2019-03-29 LAB
% GRANULOCYTES: 50.1 %G (ref 40–75)
ALBUMIN SERPL-MCNC: 4.5 MG/DL (ref 3.8–5)
ALP SERPL-CCNC: 53.4 U/L (ref 31.7–110.5)
ALT SERPL-CCNC: 8.6 U/L (ref 0–45)
AST SERPL-CCNC: 12.6 U/L (ref 0–45)
BILIRUB SERPL-MCNC: <0.4 MG/DL (ref 0.2–1.3)
BUN SERPL-MCNC: 4 MG/DL (ref 7–19)
CALCIUM SERPL-MCNC: 9 MG/DL (ref 8.5–10.1)
CHLORIDE SERPLBLD-SCNC: 100.4 MMOL/L (ref 98–110)
CO2 SERPL-SCNC: 25.1 MMOL/L (ref 20–32)
CREAT SERPL-MCNC: 0.6 MG/DL (ref 0.5–1)
GFR SERPL CREATININE-BSD FRML MDRD: >90 ML/MIN/1.7 M2
GLUCOSE SERPL-MCNC: 84.6 MG'DL (ref 70–99)
GRANULOCYTES #: 2 K/UL (ref 1.6–8.3)
HCT VFR BLD AUTO: 41.7 % (ref 35–47)
HEMOGLOBIN: 12.9 G/DL (ref 11.7–15.7)
LYMPHOCYTES # BLD AUTO: 1.5 K/UL (ref 0.8–5.3)
LYMPHOCYTES NFR BLD AUTO: 36.9 %L (ref 20–48)
MCH RBC QN AUTO: 27.7 PG (ref 26.5–35)
MCHC RBC AUTO-ENTMCNC: 30.9 G/DL (ref 32–36)
MCV RBC AUTO: 89.6 FL (ref 78–100)
MID #: 0.5 K/UL (ref 0–2.2)
MID %: 13 %M (ref 0–20)
PLATELET # BLD AUTO: 198 K/UL (ref 150–450)
POTASSIUM SERPL-SCNC: 3.6 MMOL/DL (ref 3.3–4.5)
PROT SERPL-MCNC: 7.4 G/DL (ref 6.8–8.8)
RBC # BLD AUTO: 4.65 M/UL (ref 3.8–5.2)
S PYO AG THROAT QL IA.RAPID: NEGATIVE
SODIUM SERPL-SCNC: 130.3 MMOL/L (ref 132.6–141.4)
WBC # BLD AUTO: 4 K/UL (ref 4–11)

## 2019-03-29 RX ORDER — IBUPROFEN 100 MG/5ML
400 SUSPENSION, ORAL (FINAL DOSE FORM) ORAL EVERY 6 HOURS PRN
Qty: 473 ML | Refills: 1 | Status: SHIPPED | OUTPATIENT
Start: 2019-03-29 | End: 2019-11-01 | Stop reason: ALTCHOICE

## 2019-03-29 RX ORDER — POLYETHYLENE GLYCOL 3350 17 G/17G
1 POWDER, FOR SOLUTION ORAL DAILY
Qty: 510 G | Refills: 11 | Status: SHIPPED | OUTPATIENT
Start: 2019-03-29 | End: 2020-10-15

## 2019-03-29 RX ORDER — LORATADINE 10 MG/1
10 TABLET ORAL DAILY
Qty: 90 TABLET | Refills: 1 | Status: SHIPPED | OUTPATIENT
Start: 2019-03-29 | End: 2019-11-01

## 2019-03-29 RX ORDER — OMEPRAZOLE 40 MG/1
40 CAPSULE, DELAYED RELEASE ORAL DAILY
Qty: 60 CAPSULE | Refills: 1 | Status: SHIPPED | OUTPATIENT
Start: 2019-03-29 | End: 2019-08-16

## 2019-03-29 RX ORDER — MULTIPLE VITAMINS W/ MINERALS TAB 9MG-400MCG
1 TAB ORAL DAILY
Qty: 100 TABLET | Refills: 3 | Status: SHIPPED | OUTPATIENT
Start: 2019-03-29 | End: 2021-08-23

## 2019-03-29 RX ORDER — QUETIAPINE FUMARATE 25 MG/1
25 TABLET, FILM COATED ORAL AT BEDTIME
Qty: 90 TABLET | Refills: 3 | Status: SHIPPED | OUTPATIENT
Start: 2019-03-29 | End: 2020-12-04

## 2019-03-29 RX ORDER — MIRTAZAPINE 45 MG/1
45 TABLET, FILM COATED ORAL AT BEDTIME
Qty: 31 TABLET | Refills: 11 | Status: SHIPPED | OUTPATIENT
Start: 2019-03-29 | End: 2020-08-05

## 2019-03-29 ASSESSMENT — ENCOUNTER SYMPTOMS
VOMITING: 0
FEVER: 0
PALPITATIONS: 0
NUMBNESS: 0
SHORTNESS OF BREATH: 0
ABDOMINAL DISTENTION: 0
ABDOMINAL PAIN: 0
MYALGIAS: 0
SORE THROAT: 1
DYSPHORIC MOOD: 1
DIFFICULTY URINATING: 0
POLYDIPSIA: 0
COUGH: 0
CONSTIPATION: 0
FATIGUE: 0
COLOR CHANGE: 0
DIARRHEA: 0
CHEST TIGHTNESS: 0
NERVOUS/ANXIOUS: 0
SLEEP DISTURBANCE: 0
DYSURIA: 0
EYES NEGATIVE: 1
NAUSEA: 1
BLOOD IN STOOL: 0
ARTHRALGIAS: 0

## 2019-03-29 ASSESSMENT — MIFFLIN-ST. JEOR: SCORE: 1001.96

## 2019-03-29 NOTE — NURSING NOTE
Due to patient being non-English speaking/uses sign language, an  was used for this visit. Only for face-to-face interpretation by an external agency, date and length of interpretation can be found on the scanned worksheet.     name: earl ridley  Agency: [employer]  Language: Monegasque   Telephone number: [phone]  Type of interpretation: Face-to-face, spoken

## 2019-03-29 NOTE — PROGRESS NOTES
Rupinder is a 24 year old  who presents for   Patient presents with:  RECHECK: weigh check-hard time swallowing and heartburn      Assessment and Plan          1. Sore throat    - Strep Screen Rapid (Group) ([facility])  - Strep Culture ([facility])    2. Moderate malnutrition (H)  Try cold ensure  - CBC with Diff Plt ([facility])  - Comprehensive Metabolic Panel ([facility])  - BEHAVIORAL HEALTH REFERRAL ([facility] interal and external)    3. Autism spectrum  Will refer to [facility]    4. Gastroesophageal reflux disease with esophagitis    - omeprazole (PRILOSEC) 40 MG DR capsule; Take 1 capsule (40 mg) by mouth daily  Dispense: 60 capsule; Refill: 1    5. Menstrual cramps   for opain  - ibuprofen (CHILD IBUPROFEN) 100 MG/5ML suspension; Take 20 mLs (400 mg) by mouth every 6 hours as needed for fever or moderate pain  Dispense: 473 mL; Refill: 1    6. Gastroesophageal reflux disease, esophagitis presence not specified     - Calcium Carbonate Antacid 1177 MG CHEW; Take 1 tablet (1,177 mg) by mouth 2 times daily as needed (Reflux)  Dispense: 84 tablet; Refill: 3    7. Chronic rhinitis    - loratadine (CLARITIN) 10 MG tablet; Take 1 tablet (10 mg) by mouth daily  Dispense: 90 tablet; Refill: 1    8. Adjustment disorder with depressed mood    - mirtazapine (REMERON) 45 MG tablet; Take 1 tablet (45 mg) by mouth At Bedtime  Dispense: 31 tablet; Refill: 11  - QUEtiapine (SEROQUEL) 25 MG tablet; Take 1 tablet (25 mg) by mouth At Bedtime  Dispense: 90 tablet; Refill: 3    9. Cachexia (H)    - multivitamin w/minerals (MULTI-VITAMIN) tablet; Take 1 tablet by mouth daily  Dispense: 100 tablet; Refill: 3    10. Slow transit constipation    - polyethylene glycol (MIRALAX) powder; Take 17 g (1 capful) by mouth daily  Dispense: 510 g; Refill: 11      Please call or return to clinic if your symptoms don't go away.    Follow up plan  Please make a clinic appointment for follow up with me (JOSE STAHL) on Wednesday at 4 pm 4/3   for rechek.            No Follow-up on file.    Medications Discontinued During This Encounter   Medication Reason     predniSONE (DELTASONE) 20 MG tablet      ranitidine (ZANTAC) 150 MG tablet      bacitracin 500 UNIT/GM external ointment      Calcium Carbonate Antacid 1177 MG CHEW Reorder     loratadine (CLARITIN) 10 MG tablet Reorder     mirtazapine (REMERON) 45 MG tablet Reorder     multivitamin w/minerals (MULTI-VITAMIN) tablet Reorder     polyethylene glycol (MIRALAX) powder Reorder     QUEtiapine (SEROQUEL) 25 MG tablet Reorder         Reuben Mixon MD         HPI       Rupinder is a 24 year old  who presents for   Patient presents with:  RECHECK: weigh check-hard time swallowing and heartburn      Visit [place]  1. Cachexia  Poor appetite and sore throat, refusing food. Complains of 2 weeks of sore throat pain.  Only takes water and juice. Also complains of abdominal pain -mother reports that she is having her period now and so typically has more menstrual flow. She presents with her mother who is her guardian.    A [employer]  was used for  this visit.   Problem, Medication and Allergy Lists were reviewed and updated if needed..  Patient is an established patient of this clinic..  Health Maintenance Due   Topic Date Due     PREVENTIVE CARE VISIT  1996     HPV IMMUNIZATION (1 - Female 3-dose series) 01/01/2011     DEPRESSION ACTION PLAN  01/01/2014     PAP SCREENING Q3 YR (SYSTEM ASSIGNED)  01/01/2017     PHQ-9 Q6 MONTHS  12/04/2018          Review of Systems:   Review of Systems   Constitutional: Negative for fatigue and fever.   HENT: Positive for sore throat.    Eyes: Negative.  Negative for visual disturbance.   Respiratory: Negative for cough, chest tightness and shortness of breath.    Cardiovascular: Negative for chest pain and palpitations.   Gastrointestinal: Positive for nausea ( Poor appetite.). Negative for abdominal distention, abdominal pain, blood in stool, constipation, diarrhea and vomiting.  "  Endocrine: Negative for polydipsia and polyuria.   Genitourinary: Negative for difficulty urinating and dysuria.   Musculoskeletal: Negative for arthralgias and myalgias.   Skin: Negative for color change and rash.   Neurological: Negative for numbness.   Psychiatric/Behavioral: Positive for dysphoric mood. Negative for sleep disturbance. The patient is not nervous/anxious.             Physical Exam:     Vitals:    03/29/19 0955   BP: 102/69   Pulse: 60   Temp: 98.2  F (36.8  C)   TempSrc: Oral   SpO2: 100%   Weight: 34.9 kg (77 lb)   Height: 1.486 m (4' 10.5\")     Body mass index is 15.82 kg/m .  Vitals were reviewed and were normal     Physical Exam   Constitutional: She is oriented to person, place, and time. No distress.   She is cachectic.   HENT:   Head: Normocephalic.   Eyes: Conjunctivae are normal. No scleral icterus.   Neck: Normal range of motion. No thyromegaly present.   Cardiovascular: Normal rate, regular rhythm and normal heart sounds.   No murmur heard.  Pulmonary/Chest: Effort normal and breath sounds normal. No respiratory distress. She has no wheezes.   Abdominal: Soft. Bowel sounds are normal. She exhibits no distension. There is no splenomegaly or hepatomegaly. There is no tenderness.   Musculoskeletal: She exhibits no edema.   Lymphadenopathy:     She has no cervical adenopathy.   Neurological: She is alert and oriented to person, place, and time.   Skin: Skin is warm and dry. She is not diaphoretic.   Psychiatric:   Patient appears more flat than usual is not responsive as much as usual.  We talked about possible admission patient did respond more strongly.   Vitals reviewed.        Results:      Results from this visit  Results for orders placed or performed in visit on 03/29/19   Strep Screen Rapid (Group) ([facility])   Result Value Ref Range    Rapid Strep A Screen NEGATIVE Negative   CBC with Diff Plt ([facility])   Result Value Ref Range    WBC 4.0 4.0 - 11.0 K/uL    Lymphocytes # 1.5 0.8 " - 5.3 K/uL    % Lymphocytes 36.9 20.0 - 48.0 %L    Mid # 0.5 0.0 - 2.2 K/uL    Mid % 13.0 0.0 - 20.0 %M    GRANULOCYTES # 2.0 1.6 - 8.3 K/uL    % Granulocytes 50.1 40.0 - 75.0 %G    RBC 4.65 3.80 - 5.20 M/uL    Hemoglobin 12.9 11.7 - 15.7 g/dL    Hematocrit 41.7 35.0 - 47.0 %    MCV 89.6 78.0 - 100.0 fL    MCH 27.7 26.5 - 35.0 pg    MCHC 30.9 (L) 32.0 - 36.0 g/dL    Platelets 198.0 150.0 - 450.0 K/uL   Comprehensive Metabolic Panel ([facility])   Result Value Ref Range    Albumin 4.5 3.8 - 5.0 mg/dL    Alkaline Phosphatase 53.4 31.7 - 110.5 U/L    ALT 8.6 0.0 - 45.0 U/L    AST 12.6 0.0 - 45.0 U/L    Bilirubin Total <0.4 0.2 - 1.3 mg/dL    Urea Nitrogen 4.0 (L) 7.0 - 19.0 mg/dL    Calcium 9.0 8.5 - 10.1 mg/dL    Chloride 100.4 98.0 - 110.0 mmol/L    Carbon Dioxide 25.1 20.0 - 32.0 mmol/L    Creatinine 0.6 0.5 - 1.0 mg/dL    Glucose 84.6 70.0 - 99.0 mg'dL    Potassium 3.6 3.3 - 4.5 mmol/dL    Sodium 130.3 (L) 132.6 - 141.4 mmol/L    Protein Total 7.4 6.8 - 8.8 g/dL    GFR Estimate >90 >60.0 mL/min/1.7 m2    GFR Estimate If Black >90 >60.0 mL/min/1.7 m2       Options for treatment and follow-up care were reviewed with the patient. Rupinder Spaulding  engaged in the decision making process and verbalized understanding of the options discussed and agreed with the final plan.  Reuben Mixon MD  [facility]

## 2019-03-29 NOTE — PATIENT INSTRUCTIONS
Here is the plan from today's visit    1. Sore throat    - Strep Screen Rapid (Group) ([facility])  - Strep Culture ([facility])    2. Moderate malnutrition (H)  Try cold ensure  - CBC with Diff Plt ([facility])  - Comprehensive Metabolic Panel ([facility])  - BEHAVIORAL HEALTH REFERRAL ([facility] interal and external)    3. Autism spectrum  Will refer to [facility]    4. Gastroesophageal reflux disease with esophagitis    - omeprazole (PRILOSEC) 40 MG DR capsule; Take 1 capsule (40 mg) by mouth daily  Dispense: 60 capsule; Refill: 1    5. Menstrual cramps   for opain  - ibuprofen (CHILD IBUPROFEN) 100 MG/5ML suspension; Take 20 mLs (400 mg) by mouth every 6 hours as needed for fever or moderate pain  Dispense: 473 mL; Refill: 1    6. Gastroesophageal reflux disease, esophagitis presence not specified     - Calcium Carbonate Antacid 1177 MG CHEW; Take 1 tablet (1,177 mg) by mouth 2 times daily as needed (Reflux)  Dispense: 84 tablet; Refill: 3    7. Chronic rhinitis    - loratadine (CLARITIN) 10 MG tablet; Take 1 tablet (10 mg) by mouth daily  Dispense: 90 tablet; Refill: 1    8. Adjustment disorder with depressed mood    - mirtazapine (REMERON) 45 MG tablet; Take 1 tablet (45 mg) by mouth At Bedtime  Dispense: 31 tablet; Refill: 11  - QUEtiapine (SEROQUEL) 25 MG tablet; Take 1 tablet (25 mg) by mouth At Bedtime  Dispense: 90 tablet; Refill: 3    9. Cachexia (H)    - multivitamin w/minerals (MULTI-VITAMIN) tablet; Take 1 tablet by mouth daily  Dispense: 100 tablet; Refill: 3    10. Slow transit constipation    - polyethylene glycol (MIRALAX) powder; Take 17 g (1 capful) by mouth daily  Dispense: 510 g; Refill: 11      Please call or return to clinic if your symptoms don't go away.    Follow up plan  Please make a clinic appointment for follow up with me (JOSE STAHL) on Wednesday at 4 pm 4/3   for rechek.    Thank you for coming to [facility] today.  Lab Testing:  **If you had lab testing today and your results are reassuring  or normal they will be mailed to you or sent through [employer] within 7 days.   **If the lab tests need quick action we will call you with the results.  The phone number we will call with results is # [phone] (home) . If this is not the best number please call our clinic and change the number.  Medication Refills:  If you need any refills please call your pharmacy and they will contact us.   If you need to  your refill at a new pharmacy, please contact the new pharmacy directly. The new pharmacy will help you get your medications transferred faster.   Scheduling:  If you have any concerns about today's visit or wish to schedule another appointment please call our office during normal business hours [phone] (8-5:00 M-F)  If a referral was made to a [facility] and you don't get a call from central scheduling please call [phone].  If a Mammogram was ordered for you at [facility] call [phone] to schedule or change your appointment.  If you had an XRay/CT/Ultrasound/MRI ordered the number is [phone] to schedule or change your radiology appointment.   Medical Concerns:  If you have urgent medical concerns please call [phone] at any time of the day.    Reuben Mixon MD

## 2019-03-31 LAB
BACTERIA SPEC CULT: NORMAL
SPECIMEN SOURCE: NORMAL

## 2019-04-02 ENCOUNTER — TELEPHONE (OUTPATIENT)
Dept: PSYCHOLOGY | Facility: CLINIC | Age: 24
End: 2019-04-02

## 2019-04-02 NOTE — TELEPHONE ENCOUNTER
[email or web address] has services for adults with autism and they have some programming regarding food issues in those diagnosed with autism, although that piece may be more geared toward children.  I would recommend the family call [phone] to schedule an initial evaluation for Rupinder and the [email or web address] staff can provide more information on options at that time.

## 2019-04-03 ENCOUNTER — OFFICE VISIT (OUTPATIENT)
Dept: FAMILY MEDICINE | Facility: CLINIC | Age: 24
End: 2019-04-03
Payer: MEDICAID

## 2019-04-03 VITALS
HEART RATE: 73 BPM | TEMPERATURE: 98 F | HEIGHT: 58 IN | DIASTOLIC BLOOD PRESSURE: 73 MMHG | OXYGEN SATURATION: 100 % | WEIGHT: 81 LBS | BODY MASS INDEX: 17 KG/M2 | SYSTOLIC BLOOD PRESSURE: 107 MMHG

## 2019-04-03 DIAGNOSIS — F84.0 AUTISM SPECTRUM: ICD-10-CM

## 2019-04-03 DIAGNOSIS — E44.0 MODERATE MALNUTRITION (H): Primary | ICD-10-CM

## 2019-04-03 ASSESSMENT — ENCOUNTER SYMPTOMS
TROUBLE SWALLOWING: 0
COUGH: 0
DYSURIA: 0
ABDOMINAL PAIN: 0
EYE PAIN: 0
FEVER: 0
WHEEZING: 0

## 2019-04-03 ASSESSMENT — MIFFLIN-ST. JEOR: SCORE: 1018.29

## 2019-04-03 NOTE — NURSING NOTE
Due to patient being non-English speaking/uses sign language, an  was used for this visit. Only for face-to-face interpretation by an external agency, date and length of interpretation can be found on the scanned worksheet.     name: eral ridley  Agency: [employer]  Language: Beninese   Telephone number: [phone]  Type of interpretation: Face-to-face, spoken

## 2019-04-03 NOTE — TELEPHONE ENCOUNTER
4/3/19 Enlisting the help of Stephanie PARRY CHW, to contact patient and family with information from .    Alysa Peraza  Care Coordinator

## 2019-04-03 NOTE — PATIENT INSTRUCTIONS
Here is the plan from today's visit    1. Moderate malnutrition (H)  Continue current medications and diet    2. Autism spectrum  Follow up at [facility]      Please call or return to clinic if your symptoms don't go away.    Follow up plan  Please make a clinic appointment for follow up with me (REUBEN MIXON) in one  month for check.    Thank you for coming to [facility] today.  Lab Testing:  **If you had lab testing today and your results are reassuring or normal they will be mailed to you or sent through [employer] within 7 days.   **If the lab tests need quick action we will call you with the results.  The phone number we will call with results is # [phone] (home) . If this is not the best number please call our clinic and change the number.  Medication Refills:  If you need any refills please call your pharmacy and they will contact us.   If you need to  your refill at a new pharmacy, please contact the new pharmacy directly. The new pharmacy will help you get your medications transferred faster.   Scheduling:  If you have any concerns about today's visit or wish to schedule another appointment please call our office during normal business hours [phone] (8-5:00 M-F)  If a referral was made to a [facility] and you don't get a call from central scheduling please call [phone].  If a Mammogram was ordered for you at [facility] call [phone] to schedule or change your appointment.  If you had an XRay/CT/Ultrasound/MRI ordered the number is [phone] to schedule or change your radiology appointment.   Medical Concerns:  If you have urgent medical concerns please call [phone] at any time of the day.    Reuben Mixon MD

## 2019-04-04 NOTE — PROGRESS NOTES
Rupinder is a 24 year old  who presents for   Patient presents with:  RECHECK: sore throat  Weight Check: height check also      Assessment and Plan        1. Moderate malnutrition (H)  Will continue current course of food supplements and medications     2. Autism spectrum  Referral done to [facility]. Mother is open to this.         Return in about 4 weeks (around 5/1/2019).    Medications Discontinued During This Encounter   Medication Reason     mupirocin (BACTROBAN) 2 % external ointment          Reuben Mixon MD         HPI       Rupinder is a 24 year old  who presents for   Patient presents with:  RECHECK: sore throat  Weight Check: height check also      Visit [place]  1. Cachexia and Weightloss  Patient was asked to follow up because of her weight loss, at the last visit patient had lost 3 pounds and I was very concerned. As her labs were normal we elected to have her come back in a week.  Today she appears much happier and mother reports normal appetite.  Wt Readings from Last 4 Encounters:   04/03/19 36.7 kg (81 lb)   03/29/19 34.9 kg (77 lb)   01/25/19 36.4 kg (80 lb 3.2 oz)   01/02/19 39.5 kg (87 lb)       2. Autism Spectrum Disorder  Currently not in any programming.  A [employer]  was used for  this visit.     Problem, Medication and Allergy Lists were reviewed and updated if needed..  Patient is an established patient of this clinic..  Health Maintenance Due   Topic Date Due     PREVENTIVE CARE VISIT  1996     HPV IMMUNIZATION (1 - Female 3-dose series) 01/01/2011     DEPRESSION ACTION PLAN  01/01/2014     PAP SCREENING Q3 YR (SYSTEM ASSIGNED)  01/01/2017     PHQ-9 Q6 MONTHS  12/04/2018          Review of Systems:   Review of Systems   Constitutional: Negative for fever.   HENT: Negative for trouble swallowing.    Eyes: Negative for pain.   Respiratory: Negative for cough and wheezing.    Cardiovascular: Negative for chest pain.   Gastrointestinal: Negative for abdominal pain.  "  Genitourinary: Negative for dysuria.            Physical Exam:     Vitals:    04/03/19 1608   BP: 107/73   Pulse: 73   Temp: 98  F (36.7  C)   SpO2: 100%   Weight: 36.7 kg (81 lb)   Height: 1.483 m (4' 10.39\")     Body mass index is 16.71 kg/m .  Vitals were reviewed and were normal     Physical Exam   Constitutional: She is oriented to person, place, and time. She appears well-developed and well-nourished. No distress.   Cardiovascular: Normal rate.   Neurological: She is alert and oriented to person, place, and time.   Skin: Skin is warm and dry. She is not diaphoretic.   Psychiatric: She has a normal mood and affect. Her behavior is normal. Thought content normal.   Nursing note and vitals reviewed.        Results:   No testing ordered today    Options for treatment and follow-up care were reviewed with the patient. Rupinder Spaulding  engaged in the decision making process and verbalized understanding of the options discussed and agreed with the final plan.  Reuben Mixon MD  [facility]          "

## 2019-04-05 NOTE — TELEPHONE ENCOUNTER
Referral  [id number]    Review of Dr. Mixon's order and note indicates that this is a referral for autism services.          Stephanie  Thanks for all your work on this.  That's great she has a  and I think it is very important that we connect with this  to see what services she has in place and if there are additional services the  is helping to get established.      From your note it sounds as if the mother may be confusing the  with a therapist.  They actually are different roles and have different purposes.  Dr. Mixon's referral was about intervention for the autism symptoms and also mentioned the food piece as a piece of that, but it didn't sound like the main focus.      If you can connect with the  (we probably need a release of information to do so) to find out what the patient has in place and see if the  can assist with getting the patient connected to therapy for autism that would be helpful.  Please let me know if you have any questions and I am happy to help.   Kristen    ===View-only below this line===    ----- Message -----  From: Stephanie Presley  Sent: 4/4/2019   2:19 PM  To: Kristen Porter PsyD, *    Alysa and Dr. Porter,    When I spoke with the patient about the services that could be provided by [facility], the patients mother said they already have a  that's helping Rupinder. Her weight did increase and Dr. Mixon seemed pleased with that, I'm not sure if the food programming would still be something he would like her to seek help with. They did provide Dr. Mixon with the number of the  that usually connects the mother to the  that currently works with Rupinder's family and assists them with getting some day program services. If you would like me to contact the patients mother to see what services the  was able to get her, I'm more than willing to.      ----- Message  -----  From: Kristen Porter PsyD  Sent: 4/3/2019  11:10 AM  To: Alysa Reardon    This is fantastic! Thank you so much for doing this Stephanie.  It is very appreciated!  Kristen  ----- Message -----  From: Stephanie Presley  Sent: 4/3/2019  10:37 AM  To: Kristen Porter PsyD, *    Alysa and ,    This patient is coming into the clinic today to visit Dr. Mixon, and I will be shadowing him during this visit. I will ensure I go over the information from  with the patients family, and update you both. Please let me know of anything else I can help with.    Stephanie Presley    ----- Message -----  From: Alysa Peraza  Sent: 4/3/2019   8:24 AM  To: Stephanie Presley    ----- Message from Alysa Peraza sent at 4/3/2019  8:24 AM CDT -----  Stephanie,    Can you please contact patient/family with information from ? Thank you for your help.    C.      Disclaimer  The above treatment recommendations are based on consultation with the patient's primary care provider and a review of relevant information in EPIC.? I have not personally examined the patient.? All recommendations should be implemented with considerations of the patient's relevant prior history and current clinical status.  Please contact me with any questions about the care of this patient.

## 2019-04-10 ENCOUNTER — CARE COORDINATION (OUTPATIENT)
Dept: FAMILY MEDICINE | Facility: CLINIC | Age: 24
End: 2019-04-10

## 2019-04-10 NOTE — PROGRESS NOTES
Rupinder Spaulding was given a follow-up call with the CHW to see what services she currently has set in place.  Celine with Clinical Services stated Rupinder currently has waiver services that include 4 PCA hours per day, 5 personal support hours per day, and 10 respite care hours per week. The  is working on getting Rupinder services for night supervision, setting her up to attend school instead of a day program. According to the  Rupinder's family didn't want her to attend a day program due to her not knowing English, and the  plans on setting her up for ESL classes.  CHW than reached out to Rupinder's mom regarding the clinics referral to have Rupinder receive therapy for her Autism, since the mother was under the impression that the clinic staff wanted to change her . CHW did some education for the patients family to explain the difference between the services Rupinder is currently receiving and therapy. Rupinder's mother said she would need time to think about wether or not she wants her daughter to receive therapy for her condition. She was asked to call the CHW's direct line when she decides if she would like to move forward or not with the idea of receiving therapy.     Stephanie Presley CHW, April 10, 2019 at 10:30 am

## 2019-05-17 ENCOUNTER — OFFICE VISIT (OUTPATIENT)
Dept: FAMILY MEDICINE | Facility: CLINIC | Age: 24
End: 2019-05-17
Payer: MEDICAID

## 2019-05-17 VITALS
BODY MASS INDEX: 17.34 KG/M2 | HEART RATE: 87 BPM | TEMPERATURE: 98.1 F | SYSTOLIC BLOOD PRESSURE: 101 MMHG | OXYGEN SATURATION: 100 % | HEIGHT: 58 IN | WEIGHT: 82.6 LBS | DIASTOLIC BLOOD PRESSURE: 65 MMHG

## 2019-05-17 DIAGNOSIS — E44.0 MODERATE MALNUTRITION (H): Primary | ICD-10-CM

## 2019-05-17 ASSESSMENT — ENCOUNTER SYMPTOMS
WHEEZING: 0
FEVER: 0
EYE PAIN: 0
ABDOMINAL PAIN: 0
COUGH: 0
DYSURIA: 0
TROUBLE SWALLOWING: 0

## 2019-05-17 ASSESSMENT — MIFFLIN-ST. JEOR: SCORE: 1023.39

## 2019-05-17 NOTE — NURSING NOTE
Due to patient being non-English speaking/uses sign language, an  was used for this visit. Only for face-to-face interpretation by an external agency, date and length of interpretation can be found on the scanned worksheet.     name: Patricia Presley  Language: Anguillan  Agency: [employer]  Phone number: [phone]  Type of interpretation: Face-to-face, spoken

## 2019-05-17 NOTE — PROGRESS NOTES
Rupinder is a 24 year old  who presents for   Patient presents with:  RECHECK: weight      Assessment and Plan          1. Moderate malnutrition (H)  Continue regular diet, may fast as long  As eating well at night.       Please call or return to clinic if your symptoms don't go away.    Follow up plan  Please make a clinic appointment for follow up with me (REUBEN MIXON) in 3  weeks for weight recheck.         Return in about 3 weeks (around 6/7/2019), or weight check.    There are no discontinued medications.      Reuben Mixon MD         HPI       Rupinder is a 24 year old  who presents for   Patient presents with:  RECHECK: weight      Visit Ollie  1.  Moderate malnutrition.  Patient follows up for a weight check this is been an issue she has struggled with for some time.  Her mother reports that she is fasting during Ramadan.  And that she is breaking fast appropriately with the family and eating quite a bit more she is also getting up early before prayers and eating a breakfast before sunrise.  Mother reports that she is much more talkative and interactive than previously and is doing well and is talking about going to college.  The patient also expressed more interest interaction and discussion the never seen her discussed before.    A [facility]  was used for  this visit. Problem, Medication and Allergy Lists were reviewed and updated if needed..  Patient is an established patient of this clinic..  Health Maintenance Due   Topic Date Due     PREVENTIVE CARE VISIT  1996     HPV IMMUNIZATION (1 - Female 3-dose series) 01/01/2011     DEPRESSION ACTION PLAN  01/01/2014     PAP SCREENING Q3 YR (SYSTEM ASSIGNED)  01/01/2017     PHQ-9 Q6 MONTHS  12/04/2018          Review of Systems:   Review of Systems   Constitutional: Negative for fever.   HENT: Negative for trouble swallowing.    Eyes: Negative for pain.   Respiratory: Negative for cough and wheezing.    Cardiovascular: Negative for chest pain.  "  Gastrointestinal: Negative for abdominal pain.   Genitourinary: Negative for dysuria.            Physical Exam:     Vitals:    05/17/19 0946   BP: 101/65   Pulse: 87   Temp: 98.1  F (36.7  C)   TempSrc: Oral   SpO2: 100%   Weight: 37.5 kg (82 lb 9.6 oz)   Height: 1.48 m (4' 10.25\")     Body mass index is 17.12 kg/m .  Vitals were reviewed and were normal     Physical Exam   Constitutional: She is oriented to person, place, and time. She appears well-developed and well-nourished. No distress.   Cardiovascular: Normal rate.   Neurological: She is alert and oriented to person, place, and time.   Skin: Skin is warm and dry. She is not diaphoretic.   Psychiatric: She has a normal mood and affect. Her behavior is normal. Thought content normal.   Nursing note and vitals reviewed.  Patient was much more interactive spoken paragraphs was animated, smiled shook my hand vastly improved than at previous visits.      Options for treatment and follow-up care were reviewed with the patient. Rupinder Spaulding  engaged in the decision making process and verbalized understanding of the options discussed and agreed with the final plan.  Reuben Mixon MD  [facility]          "

## 2019-05-17 NOTE — PATIENT INSTRUCTIONS
Here is the plan from today's visit    1. Moderate malnutrition (H)  Continue regular diet, may fast as long  As eating well at night.       Please call or return to clinic if your symptoms don't go away.    Follow up plan  Please make a clinic appointment for follow up with me (REUBEN MIXON) in 3  weeks for weight recheck.    Thank you for coming to [facility] today.  Lab Testing:  **If you had lab testing today and your results are reassuring or normal they will be mailed to you or sent through [facility] within 7 days.   **If the lab tests need quick action we will call you with the results.  The phone number we will call with results is # [phone] (home) . If this is not the best number please call our clinic and change the number.  Medication Refills:  If you need any refills please call your pharmacy and they will contact us.   If you need to  your refill at a new pharmacy, please contact the new pharmacy directly. The new pharmacy will help you get your medications transferred faster.   Scheduling:  If you have any concerns about today's visit or wish to schedule another appointment please call our office during normal business hours [phone] (8-5:00 M-F)  If a referral was made to a [facility] and you don't get a call from central scheduling please call [phone].  If a Mammogram was ordered for you at [facility] call [phone] to schedule or change your appointment.  If you had an XRay/CT/Ultrasound/MRI ordered the number is [phone] to schedule or change your radiology appointment.   Medical Concerns:  If you have urgent medical concerns please call [phone] at any time of the day.    Reuben Mixon MD

## 2019-07-09 ENCOUNTER — OFFICE VISIT (OUTPATIENT)
Dept: FAMILY MEDICINE | Facility: CLINIC | Age: 24
End: 2019-07-09
Payer: MEDICAID

## 2019-07-09 VITALS
OXYGEN SATURATION: 100 % | HEIGHT: 58 IN | HEART RATE: 62 BPM | BODY MASS INDEX: 16.25 KG/M2 | TEMPERATURE: 98.1 F | SYSTOLIC BLOOD PRESSURE: 106 MMHG | WEIGHT: 77.4 LBS | DIASTOLIC BLOOD PRESSURE: 70 MMHG | RESPIRATION RATE: 16 BRPM

## 2019-07-09 DIAGNOSIS — E44.0 MODERATE MALNUTRITION (H): Primary | ICD-10-CM

## 2019-07-09 DIAGNOSIS — F79 MENTAL DEFICIENCY: ICD-10-CM

## 2019-07-09 DIAGNOSIS — F84.0 AUTISM SPECTRUM: ICD-10-CM

## 2019-07-09 DIAGNOSIS — F39 MOOD DISORDER (H): ICD-10-CM

## 2019-07-09 LAB
% GRANULOCYTES: 37.2 %G (ref 40–75)
ALBUMIN SERPL-MCNC: 4.7 MG/DL (ref 3.8–5)
ALP SERPL-CCNC: 52.4 U/L (ref 31.7–110.5)
ALT SERPL-CCNC: 10.1 U/L (ref 0–45)
AST SERPL-CCNC: 15.3 U/L (ref 0–45)
BILIRUB SERPL-MCNC: <0.4 MG/DL (ref 0.2–1.3)
BUN SERPL-MCNC: 13.5 MG/DL (ref 7–19)
CALCIUM SERPL-MCNC: 9.5 MG/DL (ref 8.5–10.1)
CHLORIDE SERPLBLD-SCNC: 105.5 MMOL/L (ref 98–110)
CO2 SERPL-SCNC: 26.2 MMOL/L (ref 20–32)
CREAT SERPL-MCNC: 0.7 MG/DL (ref 0.5–1)
DEPRECATED CALCIDIOL+CALCIFEROL SERPL-MC: 19 UG/L (ref 20–75)
GFR SERPL CREATININE-BSD FRML MDRD: >90 ML/MIN/1.7 M2
GLUCOSE SERPL-MCNC: 97.2 MG'DL (ref 70–99)
GRANULOCYTES #: 1.5 K/UL (ref 1.6–8.3)
HCT VFR BLD AUTO: 40.7 % (ref 35–47)
HEMOGLOBIN: 12.5 G/DL (ref 11.7–15.7)
LYMPHOCYTES # BLD AUTO: 2.1 K/UL (ref 0.8–5.3)
LYMPHOCYTES NFR BLD AUTO: 52.6 %L (ref 20–48)
MCH RBC QN AUTO: 27.2 PG (ref 26.5–35)
MCHC RBC AUTO-ENTMCNC: 30.7 G/DL (ref 32–36)
MCV RBC AUTO: 88.5 FL (ref 78–100)
MID #: 0.4 K/UL (ref 0–2.2)
MID %: 10.2 %M (ref 0–20)
PLATELET # BLD AUTO: 215 K/UL (ref 150–450)
POTASSIUM SERPL-SCNC: 3.9 MMOL/DL (ref 3.3–4.5)
PROT SERPL-MCNC: 7.5 G/DL (ref 6.8–8.8)
RBC # BLD AUTO: 4.6 M/UL (ref 3.8–5.2)
SODIUM SERPL-SCNC: 138.9 MMOL/L (ref 132.6–141.4)
VIT B12 SERPL-MCNC: 390 PG/ML (ref 193–986)
WBC # BLD AUTO: 3.9 K/UL (ref 4–11)

## 2019-07-09 ASSESSMENT — MIFFLIN-ST. JEOR: SCORE: 1003.21

## 2019-07-09 NOTE — LETTER
July 10, 2019      Rupinder Spaulding  [address]  [place] [address]        Dear Rupinder,    Thank you for getting your care at [facility]. Please see below for your test results. They are mostly reassuring. Your Vit D level still remains low. Your Vit B12 level is fine, your liver and kidney function and blood count are normal.    Resulted Orders   Comprehensive Metabolic Panel ([facility])   Result Value Ref Range    Albumin 4.7 3.8 - 5.0 mg/dL    Alkaline Phosphatase 52.4 31.7 - 110.5 U/L    ALT 10.1 0.0 - 45.0 U/L    AST 15.3 0.0 - 45.0 U/L    Bilirubin Total <0.4 0.2 - 1.3 mg/dL    Urea Nitrogen 13.5 7.0 - 19.0 mg/dL    Calcium 9.5 8.5 - 10.1 mg/dL    Chloride 105.5 98.0 - 110.0 mmol/L    Carbon Dioxide 26.2 20.0 - 32.0 mmol/L    Creatinine 0.7 0.5 - 1.0 mg/dL    Glucose 97.2 70.0 - 99.0 mg'dL    Potassium 3.9 3.3 - 4.5 mmol/dL    Sodium 138.9 132.6 - 141.4 mmol/L    Protein Total 7.5 6.8 - 8.8 g/dL    GFR Estimate >90 >60.0 mL/min/1.7 m2    GFR Estimate If Black >90 >60.0 mL/min/1.7 m2   Vitamin D Level   Result Value Ref Range    Vitamin D Deficiency screening 19 (L) 20 - 75 ug/L      Comment:      Season, race, dietary intake, and treatment affect the concentration of   25-hydroxy-Vitamin D. Values may decrease during winter months and increase   during summer months. Values 20-29 ug/L may indicate Vitamin D insufficiency   and values <20 ug/L may indicate Vitamin D deficiency.  Vitamin D determination is routinely performed by an immunoassay specific for   25 hydroxyvitamin D3.  If an individual is on vitamin D2 (ergocalciferol)   supplementation, please specify 25 OH vitamin D2 and D3 level determination by   LCMSMS test VITD23.     Vitamin B12   Result Value Ref Range    Vitamin B12 390 193 - 986 pg/mL   CBC with Diff Plt ([facility])   Result Value Ref Range    WBC 3.9 (L) 4.0 - 11.0 K/uL    Lymphocytes # 2.1 0.8 - 5.3 K/uL    % Lymphocytes 52.6 (H) 20.0 - 48.0 %L    Mid # 0.4 0.0 - 2.2 K/uL    Mid %  10.2 0.0 - 20.0 %M    GRANULOCYTES # 1.5 (L) 1.6 - 8.3 K/uL    % Granulocytes 37.2 (L) 40.0 - 75.0 %G    RBC 4.60 3.80 - 5.20 M/uL    Hemoglobin 12.5 11.7 - 15.7 g/dL    Hematocrit 40.7 35.0 - 47.0 %    MCV 88.5 78.0 - 100.0 fL    MCH 27.2 26.5 - 35.0 pg    MCHC 30.7 (L) 32.0 - 36.0 g/dL    Platelets 215.0 150.0 - 450.0 K/uL       As we discussed at our last visit, please follow up with a clinic appointment to review these results further.    Sincerely,    Jessica Long MD

## 2019-07-09 NOTE — PATIENT INSTRUCTIONS
Here is the plan from today's visit    1. Moderate malnutrition (H)  Will have Stephanie, Community Health Worker, will contact you. Will send letter with results and speak about them at the next visit.  - Comprehensive Metabolic Panel ([facility])  - Vitamin D Level  - Vitamin B12  - CBC with Diff Plt ([facility])    Please call or return to clinic if your symptoms don't go away.    Follow up plan  Please make a clinic appointment for follow up with me (CRUZ LONG) in 3  weeks for follow up.    Thank you for coming to [facility] today.  Lab Testing:  **If you had lab testing today and your results are reassuring or normal they will be mailed to you or sent through [facility] within 7 days.   **If the lab tests need quick action we will call you with the results.  The phone number we will call with results is # [phone] (home) . If this is not the best number please call our clinic and change the number.  Medication Refills:  If you need any refills please call your pharmacy and they will contact us.   If you need to  your refill at a new pharmacy, please contact the new pharmacy directly. The new pharmacy will help you get your medications transferred faster.   Scheduling:  If you have any concerns about today's visit or wish to schedule another appointment please call our office during normal business hours [phone] (8-5:00 M-F)  If a referral was made to a [facility] and you don't get a call from central scheduling please call [phone].  If a Mammogram was ordered for you at [facility] call [phone] to schedule or change your appointment.  If you had an XRay/CT/Ultrasound/MRI ordered the number is [phone] to schedule or change your radiology appointment.   Medical Concerns:  If you have urgent medical concerns please call [phone] at any time of the day.    Cruz Long MD

## 2019-07-09 NOTE — PROGRESS NOTES
"       MARTY Spaulding is a 24 year old  who presents for   Chief Complaint   Patient presents with     Derm Problem     itching all over her body, night time seem to be the worse, about two weeks already     Weight Check     24-year-old patient with a history of Autism Spectrum Disorder who is here with her mother and a professional [place]  was used.    Weight loss: Per extensive chart review, patient has had a long history of malnutrition and difficulty keeping her weight up.  Has been seeing her PCP, Dr. Mixon, most recent appointment was 5/17/19. Was 82 lbs on 5/17/19 and is 77 lbs today.  Asking the patient questions, Mother answers them.  No appetite currently.  Mother states that her appetite was good during Ramadan when breaking fast, but has declined since.  Appetite tends to fluctuate.  1 months it's okay and then the next month she has no appetite and doesn't want to eat.  Patient states that \"she's a big girl and she can eat if she wants to\". No nausea, abdominal pain, or feeling then need to vomit. Energy level seems to be fine per patient. Mom says the opposite: the weeks she eats, she's moving around and more active. The weeks she doesn't eat, she doesn't move much. No school or job currently. States that she finished high school. Started to go to college, but is off for the summer. Average food intake per day: 2 meals/day on the weeks she eats, the weeks she doesn't each much, it's one meal a day. Still having regular menstrual cycles. Regular BMs. Takes miralax.     A [place]  was used for  this visit.    +++++++    Itching: Patient states that she has had itching on her arms and legs for the last 2 weeks.  Did not notice a rash there, but after scratching it turned red.  No change in detergent, lotion, or soap.    Autism Spectrum Disorder: Dr. Mixon put in a referral to [facility] for resources and possible treatment for autism spectrum disorder.  Mother states that " "they have not followed up on that. Mom didn't seem interested.  Per extensive chart review, per Mom patient was developing normally until around 11 years of age.  There is some mention of a severe illness at that time and then regressing from there.  Seems to have had assessments done in [place] where they first moved from [place] and at [facility], but no clear records in chart.  Is currently on mirtazapine 45 mg at bedtime as well as Seroquel 25 mg at bedtime.    Asked Mom to step out: Interview seemed very tense and stressed with mom in there.  Asked mom to step out for a brief interview with patient by herself which mom was okay with.  Patient opened up significantly when she was by herself.  Was tearful when discussing her life circumstances.  Feels very out of control and feels as if her eating is only thing she has control over.  Feels as if the [place] community forces women to eat a lot and she does not want to be \"fat\".  Feels like she can never go out of the house and does not have any purpose in life.  Would like to have a job.  Thinks mom is very overbearing.    Problem, Medication and Allergy Lists were reviewed and updated if needed..    Patient is an established patient of this clinic..         Review of Systems:   Review of Systems  See HPI       Physical Exam:     Vitals:    07/09/19 0939   BP: 106/70   Pulse: 62   Resp: 16   Temp: 98.1  F (36.7  C)   TempSrc: Oral   SpO2: 100%   Weight: 35.1 kg (77 lb 6.4 oz)   Height: 1.485 m (4' 10.47\")     Body mass index is 15.92 kg/m .  Vital signs normal except low BMI     Physical Exam   Constitutional: She is oriented to person, place, and time. No distress.   HENT:   Head: Normocephalic and atraumatic.   Cardiovascular: Normal rate.   Pulmonary/Chest: Effort normal.   Neurological: She is alert and oriented to person, place, and time. No cranial nerve deficit.   Skin: Skin is warm and dry. No rash noted.   Psychiatric:   Mother talked most of the visit and " patient very short. Asked mother to step out for a bit and patient opened up. Was on and off tearful when talking about how little control she felt she has over her eating habits and her life.         Results:      Results from this visit  Results for orders placed or performed in visit on 07/09/19   Comprehensive Metabolic Panel ([facility])   Result Value Ref Range    Albumin 4.7 3.8 - 5.0 mg/dL    Alkaline Phosphatase 52.4 31.7 - 110.5 U/L    ALT 10.1 0.0 - 45.0 U/L    AST 15.3 0.0 - 45.0 U/L    Bilirubin Total <0.4 0.2 - 1.3 mg/dL    Urea Nitrogen 13.5 7.0 - 19.0 mg/dL    Calcium 9.5 8.5 - 10.1 mg/dL    Chloride 105.5 98.0 - 110.0 mmol/L    Carbon Dioxide 26.2 20.0 - 32.0 mmol/L    Creatinine 0.7 0.5 - 1.0 mg/dL    Glucose 97.2 70.0 - 99.0 mg'dL    Potassium 3.9 3.3 - 4.5 mmol/dL    Sodium 138.9 132.6 - 141.4 mmol/L    Protein Total 7.5 6.8 - 8.8 g/dL    GFR Estimate >90 >60.0 mL/min/1.7 m2    GFR Estimate If Black >90 >60.0 mL/min/1.7 m2   Vitamin D Level   Result Value Ref Range    Vitamin D Deficiency screening 19 (L) 20 - 75 ug/L   Vitamin B12   Result Value Ref Range    Vitamin B12 390 193 - 986 pg/mL   CBC with Diff Plt ([facility])   Result Value Ref Range    WBC 3.9 (L) 4.0 - 11.0 K/uL    Lymphocytes # 2.1 0.8 - 5.3 K/uL    % Lymphocytes 52.6 (H) 20.0 - 48.0 %L    Mid # 0.4 0.0 - 2.2 K/uL    Mid % 10.2 0.0 - 20.0 %M    GRANULOCYTES # 1.5 (L) 1.6 - 8.3 K/uL    % Granulocytes 37.2 (L) 40.0 - 75.0 %G    RBC 4.60 3.80 - 5.20 M/uL    Hemoglobin 12.5 11.7 - 15.7 g/dL    Hematocrit 40.7 35.0 - 47.0 %    MCV 88.5 78.0 - 100.0 fL    MCH 27.2 26.5 - 35.0 pg    MCHC 30.7 (L) 32.0 - 36.0 g/dL    Platelets 215.0 150.0 - 450.0 K/uL       Assessment and Plan        Rupinder was seen today for derm problem and weight check.    Diagnoses and all orders for this visit:    Moderate malnutrition (H) -  Ordered CMP, vitamin D level, vitamin B12 level, CBC.  Results appeared after patient already left.  Besides vitamin D being low,  which is common in the [place] population, the rest of her labs look normal.  BMI is 15, which is very low.  However, patient is still menstruating.  Per chart review, patient has been dealing with this for several years.  Discussed healthy calorie rich foods that patient could be eating if she does not like eating multiple meals a day.  Spoke with patient on her own and then at the end along with mom to discuss eating habits.  Tried to instill some ownership of the patient's nutrition because patient feels like she does not have any control of her life.  Would like to see patient in 3 weeks for follow-up.  -     Comprehensive Metabolic Panel ([facility])  -     Vitamin D Level  -     Vitamin B12  -     CBC with Diff Plt ([facility])  -      : Sign Language or Oral - 83-97 minutes    Mood disorder (H)  Autism spectrum  Mental deficiency  Patient has a long history of behavioral health issues.  This is my first time meeting patient.  Cannot find formal diagnosis in records, but mood disorder, autism spectrum disorder, and intellectual disability have been mentioned.  Does not seem like mom is very interested in getting therapy for patient, but also seems very concerned about patient's health.  Spoke about our community health worker, Setphanie, getting in touch with patient and her mom about getting a job that she could go to regularly (perhaps a [employer] or something like that).  Will forward chart onto our comity health worker and would like to see her back in 3 weeks for further mental health evaluation as well as to check in on her malnutrition.       There are no discontinued medications.    Options for treatment and follow-up care were reviewed with the patient. Rupinder Spaulding  engaged in the decision making process and verbalized understanding of the options discussed and agreed with the final plan.    Jessica Long MD  PGY-3

## 2019-07-09 NOTE — Clinical Note
"Thought this patient could use a call/visit to discuss possible jobs. [employer] or something like this. Guamanian speaking, not sure if you had other ideas. School doesn't seem like an option and patient really wanting a job to \"feel worth something\". Thanks,Jessica"

## 2019-07-09 NOTE — NURSING NOTE
Due to patient being non-English speaking/uses sign language, an  was used for this visit. Only for face-to-face interpretation by an external agency, date and length of interpretation can be found on the scanned worksheet.     name: Divine Montes   Agency: [employer]  Language: Finnish   Telephone number: [phone]  Type of interpretation: Face-to-face, spoken    Law SHAHAB Byrd

## 2019-07-18 NOTE — PROGRESS NOTES
Preceptor Attestation:   Patient seen, evaluated and discussed with the resident. I have verified the content of the note, which accurately reflects my assessment of the patient and the plan of care.   Supervising Physician:  Shreyas Washburn MD

## 2019-07-31 ENCOUNTER — OFFICE VISIT (OUTPATIENT)
Dept: FAMILY MEDICINE | Facility: CLINIC | Age: 24
End: 2019-07-31
Payer: MEDICAID

## 2019-07-31 VITALS
HEART RATE: 73 BPM | BODY MASS INDEX: 16.12 KG/M2 | WEIGHT: 76.8 LBS | SYSTOLIC BLOOD PRESSURE: 101 MMHG | TEMPERATURE: 97.6 F | OXYGEN SATURATION: 97 % | HEIGHT: 58 IN | RESPIRATION RATE: 16 BRPM | DIASTOLIC BLOOD PRESSURE: 68 MMHG

## 2019-07-31 DIAGNOSIS — F39 MOOD DISORDER (H): ICD-10-CM

## 2019-07-31 DIAGNOSIS — F84.0 AUTISM SPECTRUM: Primary | ICD-10-CM

## 2019-07-31 DIAGNOSIS — R63.4 WEIGHT LOSS: ICD-10-CM

## 2019-07-31 DIAGNOSIS — E55.9 VITAMIN D DEFICIENCY: ICD-10-CM

## 2019-07-31 RX ORDER — CHOLECALCIFEROL (VITAMIN D3) 50 MCG
2000 TABLET ORAL DAILY
Qty: 90 TABLET | Refills: 0 | Status: SHIPPED | OUTPATIENT
Start: 2019-07-31 | End: 2019-11-01

## 2019-07-31 ASSESSMENT — MIFFLIN-ST. JEOR: SCORE: 997.36

## 2019-07-31 NOTE — PATIENT INSTRUCTIONS
Here is the plan from today's visit    1. Autism spectrum  2. Mood disorder (H)  3. Weight loss  - BEHAVIORAL HEALTH REFERRAL ([facility] interal and external)    4. Vitamin D deficiency  Take one tablet daily  - vitamin D3 (CHOLECALCIFEROL) 2000 units (50 mcg) tablet; Take 1 tablet (2,000 Units) by mouth daily  Dispense: 90 tablet; Refill: 0      Please call or return to clinic if your symptoms don't go away.    Follow up plan  Please make a clinic appointment for follow up with your primary physician Reuben Mixon MD for citizenship forms and me 1 month after that.    Thank you for coming to [facility] today.  Lab Testing:  **If you had lab testing today and your results are reassuring or normal they will be mailed to you or sent through [facility] within 7 days.   **If the lab tests need quick action we will call you with the results.  The phone number we will call with results is # [phone] (home) . If this is not the best number please call our clinic and change the number.  Medication Refills:  If you need any refills please call your pharmacy and they will contact us.   If you need to  your refill at a new pharmacy, please contact the new pharmacy directly. The new pharmacy will help you get your medications transferred faster.   Scheduling:  If you have any concerns about today's visit or wish to schedule another appointment please call our office during normal business hours [phone] (8-5:00 M-F)  If a referral was made to a [facility] and you don't get a call from central scheduling please call [phone].  If a Mammogram was ordered for you at [facility] call [phone] to schedule or change your appointment.  If you had an XRay/CT/Ultrasound/MRI ordered the number is [phone] to schedule or change your radiology appointment.   Medical Concerns:  If you have urgent medical concerns please call [phone] at any time of the day.    Jessica Long,  MD

## 2019-07-31 NOTE — PROGRESS NOTES
Preceptor Attestation:   Patient seen, evaluated and discussed with the resident. I have verified the content of the note, which accurately reflects my assessment of the patient and the plan of care.   Supervising Physician:  Reuben Mixon MD

## 2019-07-31 NOTE — PROGRESS NOTES
"       HPI       Rupinder Spaulding is a 24 year old  who presents for   Chief Complaint   Patient presents with     RECHECK     Weight recheck, no concerns.     Weight: Concerns for anorexia.  Patient of Dr. Horvath who has been following for weight loss over many months.  She is at her lowest she is ever been.  Has essentially had no weight loss since her visit with me on 7/9/19.  States patient states her eating has been fine and mom agrees.  Patient states overall her appetite is better and eat whatever she wants.  Does not do chores at home and has been seeing her friends regularly. Went over lab tests from last visit.    Mood/Autism: At her last visit, we discussed having Stephanie, our community health worker, call her to discuss possible ideas for job placement.  She has been on the country and has not been able to contact them.  However, discussed a visit with a child psychiatrist who will be coming regularly to our clinic soon.  Patient and mom willing to try this.    Citizenship: Patient stating she is working on moving back to [place], but in the meantime is applying for citizenship.  Has forms to fill out for this.    A Samoan  was used for this visit.    +++++++      Problem, Medication and Allergy Lists were reviewed and updated if needed..    Patient is an established patient of this clinic..         Review of Systems:   Review of Systems  See HPI       Physical Exam:     Vitals:    07/31/19 0827   BP: 101/68   BP Location: Left arm   Patient Position: Sitting   Cuff Size: Adult Small   Pulse: 73   Resp: 16   Temp: 97.6  F (36.4  C)   TempSrc: Oral   SpO2: 97%   Weight: 34.8 kg (76 lb 12.8 oz)   Height: 1.48 m (4' 10.27\")     Body mass index is 15.9 kg/m .  Vital signs normal except low BMI     Physical Exam   Constitutional: She is oriented to person, place, and time. No distress.   Very thin Samoan woman   HENT:   Head: Normocephalic and atraumatic.   Eyes: EOM are normal.   Cardiovascular: Normal " rate.   Pulmonary/Chest: Effort normal.   Neurological: She is alert and oriented to person, place, and time.   Skin: She is not diaphoretic.   Psychiatric:   Patient and mom had normal interactions today.  At last visit mom took over the conversation and and today patient spoke most of the time.  Patient was able to answer all of my questions and I was able to confer with mom. Affect does seem flat.         Results:   Results from last visit:  Office Visit on 07/09/2019   Component Date Value Ref Range Status     Albumin 07/09/2019 4.7  3.8 - 5.0 mg/dL Final     Alkaline Phosphatase 07/09/2019 52.4  31.7 - 110.5 U/L Final     ALT 07/09/2019 10.1  0.0 - 45.0 U/L Final     AST 07/09/2019 15.3  0.0 - 45.0 U/L Final     Bilirubin Total 07/09/2019 <0.4  0.2 - 1.3 mg/dL Final     Urea Nitrogen 07/09/2019 13.5  7.0 - 19.0 mg/dL Final     Calcium 07/09/2019 9.5  8.5 - 10.1 mg/dL Final     Chloride 07/09/2019 105.5  98.0 - 110.0 mmol/L Final     Carbon Dioxide 07/09/2019 26.2  20.0 - 32.0 mmol/L Final     Creatinine 07/09/2019 0.7  0.5 - 1.0 mg/dL Final     Glucose 07/09/2019 97.2  70.0 - 99.0 mg'dL Final     Potassium 07/09/2019 3.9  3.3 - 4.5 mmol/dL Final     Sodium 07/09/2019 138.9  132.6 - 141.4 mmol/L Final     Protein Total 07/09/2019 7.5  6.8 - 8.8 g/dL Final     GFR Estimate 07/09/2019 >90  >60.0 mL/min/1.7 m2 Final     GFR Estimate If Black 07/09/2019 >90  >60.0 mL/min/1.7 m2 Final     Vitamin D Deficiency screening 07/09/2019 19* 20 - 75 ug/L Final    Comment: Season, race, dietary intake, and treatment affect the concentration of   25-hydroxy-Vitamin D. Values may decrease during winter months and increase   during summer months. Values 20-29 ug/L may indicate Vitamin D insufficiency   and values <20 ug/L may indicate Vitamin D deficiency.  Vitamin D determination is routinely performed by an immunoassay specific for   25 hydroxyvitamin D3.  If an individual is on vitamin D2 (ergocalciferol)   supplementation,  please specify 25 OH vitamin D2 and D3 level determination by   LCMSMS test VITD23.       Vitamin B12 07/09/2019 390  193 - 986 pg/mL Final     WBC 07/09/2019 3.9* 4.0 - 11.0 K/uL Final     Lymphocytes # 07/09/2019 2.1  0.8 - 5.3 K/uL Final     % Lymphocytes 07/09/2019 52.6* 20.0 - 48.0 %L Final     Mid # 07/09/2019 0.4  0.0 - 2.2 K/uL Final     Mid % 07/09/2019 10.2  0.0 - 20.0 %M Final     GRANULOCYTES # 07/09/2019 1.5* 1.6 - 8.3 K/uL Final     % Granulocytes 07/09/2019 37.2* 40.0 - 75.0 %G Final     RBC 07/09/2019 4.60  3.80 - 5.20 M/uL Final     Hemoglobin 07/09/2019 12.5  11.7 - 15.7 g/dL Final     Hematocrit 07/09/2019 40.7  35.0 - 47.0 % Final     MCV 07/09/2019 88.5  78.0 - 100.0 fL Final     MCH 07/09/2019 27.2  26.5 - 35.0 pg Final     MCHC 07/09/2019 30.7* 32.0 - 36.0 g/dL Final     Platelets 07/09/2019 215.0  150.0 - 450.0 K/uL Final       Assessment and Plan        Rupinder was seen today for recheck.    Diagnoses and all orders for this visit:    Autism spectrum  Mood disorder (H)  Weight loss  Diagnosis still unclear to me having only met the patient now 2 times.  May have a component of OCD controlling her weight is how she manifests this.  Weight is stable from last visit on 7/9/19 and no lab abnormalities noted.  Feel as if child psychiatry at our clinic would be the best option (discussed this with Dr. Mixon her PCP).  Patient has been referred out multiple times, with no follow-up.  Mom and daughter both seem interested in this option.  -     BEHAVIORAL HEALTH REFERRAL ([place] [facility] interal and external)    Vitamin D deficiency - Patient's vitamin D was low at last visit.  Will start vitamin D supplementation.  -     vitamin D3 (CHOLECALCIFEROL) 2000 units (50 mcg) tablet; Take 1 tablet (2,000 Units) by mouth daily    Will schedule with her PCP, Dr. Mixon, for citizenship papers since he knows her very well. Follow up with me 1 month after she sees Dr. Mixon.       There are no discontinued  medications.    Options for treatment and follow-up care were reviewed with the patient. Rupinder Spaulding  engaged in the decision making process and verbalized understanding of the options discussed and agreed with the final plan.    Jessica Long MD  PGY-3

## 2019-07-31 NOTE — NURSING NOTE
Due to patient being non-English speaking/uses sign language, an  was used for this visit. Only for face-to-face interpretation by an external agency, date and length of interpretation can be found on the scanned worksheet.     name: Chris Mack  Agency: [employer]  Language: Sao Tomean   Telephone number: [phone]  Type of interpretation: Face-to-face, spoken     ,Cass De La Garza CMA on 7/31/2019 at 8:25 AM

## 2019-08-02 ENCOUNTER — TELEPHONE (OUTPATIENT)
Dept: PSYCHOLOGY | Facility: CLINIC | Age: 24
End: 2019-08-02

## 2019-08-02 NOTE — TELEPHONE ENCOUNTER
Psychiatry Consult Referral:  Please schedule this patient with Dr. Bartlett.  This is for a consult only, not for ongoing psychiatric care.  She will provide recommendations to the PCP for ongoing care.     Please let the patient know that this is an educational consult clinic.  For that reason, Dr. Bartlett and a resident will be seeing the patient together.  If the patient is not comfortable with that we can assist with making arrangements for a psychiatry consult at an outside clinic.    If you are unable to reach the patient after two phone calls, please send a letter and close this encounter.    Thank you!

## 2019-08-05 ENCOUNTER — CARE COORDINATION (OUTPATIENT)
Dept: FAMILY MEDICINE | Facility: CLINIC | Age: 24
End: 2019-08-05

## 2019-08-05 NOTE — PROGRESS NOTES
Rupinder Spaulding was reached out to by the clinic's CHW, but wasn't able to be reached. CHW left a detailed voicemail and requested a call back from the pt.

## 2019-08-13 ENCOUNTER — CARE COORDINATION (OUTPATIENT)
Dept: FAMILY MEDICINE | Facility: CLINIC | Age: 24
End: 2019-08-13

## 2019-08-13 NOTE — PROGRESS NOTES
Rupinder Spaulding is a 24 year old woman who discussed with her PCP an interest to get employment, and was reached out to by the CHW to further discuss possible job opportunities. CHW reached out to pt and was able to speak with her mother Brynn Rojas who received information for referrals that can provide employment opportunities for disabled people. CHW told pt's mother she can stop by the [facility] if she needs further assistance with filling out the applications.    Stephanie Presley, August 13, 2019 at 10:16 AM

## 2019-08-16 ENCOUNTER — OFFICE VISIT (OUTPATIENT)
Dept: FAMILY MEDICINE | Facility: CLINIC | Age: 24
End: 2019-08-16
Payer: MEDICAID

## 2019-08-16 VITALS
OXYGEN SATURATION: 100 % | TEMPERATURE: 98 F | SYSTOLIC BLOOD PRESSURE: 104 MMHG | WEIGHT: 77.2 LBS | RESPIRATION RATE: 16 BRPM | DIASTOLIC BLOOD PRESSURE: 61 MMHG | BODY MASS INDEX: 15.99 KG/M2 | HEART RATE: 71 BPM

## 2019-08-16 DIAGNOSIS — Z02.89 ENCOUNTER FOR REVIEW OF FORM WITH PATIENT: Primary | ICD-10-CM

## 2019-08-16 DIAGNOSIS — R63.4 WEIGHT LOSS: ICD-10-CM

## 2019-08-16 ASSESSMENT — ENCOUNTER SYMPTOMS
FEVER: 0
CHILLS: 0
CONSTIPATION: 0
ABDOMINAL PAIN: 0
FATIGUE: 0
COUGH: 0
DIARRHEA: 0

## 2019-08-16 NOTE — PROGRESS NOTES
HPI       Rupinder Spaulding is a 24 year old  who presents for   Chief Complaint   Patient presents with     discuss possible paperwork     Updates:Has appt with new child psychiatrist coming to \[facility]\"" on 9/5/19 here at 3pm. Confirmed with patient and her Mom. Requested the same . Stephanie called and spoke with Mom about jobs for Rupinder.    Forms: Mom did not bring the form today, but requesting exception for citizenship exam for Rupinder.  Patient has long-term patient of Dr. Horvath.  Will fill out as much form today as possible with .  Dr. Mixon will complete the form and leave at the  for the patient.  Rupinder has had no injuries or major illnesses in her life.  Past diagnosis of mental deficiency and possible autism spectrum.  Requesting child psychiatry evaluation for clarification.  [place] and graduated high school (started in 9th grade in the [place] Has not been to college.    Weight loss: Appetite has been better.  Patient has maintained weight and even gained a pound since her last visit.  Mom has no concerns today and hold on feels well with no concerns.    LMP: 8/3/19, period lasts 4 days and are not heavy    +++++++    Problem, Medication and Allergy Lists were reviewed and updated if needed..    Patient is an established patient of this clinic..         Review of Systems:   Review of Systems   Constitutional: Negative for chills, fatigue and fever.   Respiratory: Negative for cough.    Cardiovascular: Negative for chest pain.   Gastrointestinal: Negative for abdominal pain, constipation and diarrhea.   Genitourinary: Negative for menstrual problem.            Physical Exam:     Vitals:    08/16/19 0911   BP: 104/61   Pulse: 71   Resp: 16   Temp: 98  F (36.7  C)   TempSrc: Oral   SpO2: 100%   Weight: 35 kg (77 lb 3.2 oz)     Body mass index is 15.99 kg/m .  Vitals were reviewed and were normal     Physical Exam   Constitutional: She is oriented to person, place, and  time. No distress.   HENT:   Head: Normocephalic and atraumatic.   Nose: Nose normal.   Eyes: EOM are normal.   Cardiovascular: Normal rate and regular rhythm. Exam reveals no gallop and no friction rub.   No murmur heard.  Pulmonary/Chest: Effort normal and breath sounds normal. No stridor. No respiratory distress. She has no wheezes. She has no rales.   Neurological: She is alert and oriented to person, place, and time.   Skin: Skin is warm and dry. She is not diaphoretic.   Psychiatric: She has a normal mood and affect. Her behavior is normal.         Results:   No testing ordered today    Assessment and Plan        Rupinder was seen today for discuss possible paperwork.    Diagnoses and all orders for this visit:    Encounter for review of form with patient - Patient and mother requesting exemption for citizenship exam.  Printed out N-648 medical certification for disability exceptions form.  Had  fill out her portion of the form as well as Rupinder's green card number.  As Dr. Mixon is her PCP, will give to him for completion of the form.  Informed mom and patient that we will leave this form at the  once completed.  Also requested help in setting up necessary appointments for this.  Will send to social work/care coordination for assistance when form is ready.    Weight loss -patient has maintained weight and even gained a pound since her last visit on 7/31/19.  Patient appetite has been good.  Encourage patient to continue eating as she has been doing and praised her for her weight gain.  Has appointment with our child psychiatrist on 9/5/19 at 3pm. Past diagnosis of mental deficiency and possible autism spectrum.  Requesting child psychiatry evaluation for clarification.  Possible that autism spectrum diagnosis could be contributing to weight loss and food aversion.  Would like to follow-up with patient after that visit.       There are no discontinued medications.    Options for treatment and  follow-up care were reviewed with the patient. Rupinder Spaulding  engaged in the decision making process and verbalized understanding of the options discussed and agreed with the final plan.    Jessica Long MD  PGY-3

## 2019-08-16 NOTE — NURSING NOTE
Due to patient being non-English speaking/uses sign language, an  was used for this visit. Only for face-to-face interpretation by an external agency, date and length of interpretation can be found on the scanned worksheet.     name: Sean Salinas  Agency: [employer]  Language: Greek   Telephone number: [phone]  Type of interpretation: Face-to-face, spoken

## 2019-08-16 NOTE — PATIENT INSTRUCTIONS
Here is the plan from today's visit    1. Encounter for review of form with patient  Dr. Mixon will leave the form at the  when it is ready.    2. Weight loss  Have not lost weight and have actually gained 1 lbs since our last visit. Continue to try and eat multiple small meals a day.      Please call or return to clinic if your symptoms don't go away.    Follow up plan  Please make a clinic appointment for follow up with me (CRUZ LONG) in 1 months after you see the psychiatrist.    Thank you for coming to [facility] today.  Lab Testing:  **If you had lab testing today and your results are reassuring or normal they will be mailed to you or sent through [employer] within 7 days.   **If the lab tests need quick action we will call you with the results.  The phone number we will call with results is # [phone] (home) . If this is not the best number please call our clinic and change the number.  Medication Refills:  If you need any refills please call your pharmacy and they will contact us.   If you need to  your refill at a new pharmacy, please contact the new pharmacy directly. The new pharmacy will help you get your medications transferred faster.   Scheduling:  If you have any concerns about today's visit or wish to schedule another appointment please call our office during normal business hours [phone] (8-5:00 M-F)  If a referral was made to a [facility] and you don't get a call from central scheduling please call [phone].  If a Mammogram was ordered for you at [facility] call [phone] to schedule or change your appointment.  If you had an XRay/CT/Ultrasound/MRI ordered the number is [phone] to schedule or change your radiology appointment.   Medical Concerns:  If you have urgent medical concerns please call [phone] at any time of the day.    Cruz Long MD

## 2019-08-21 ENCOUNTER — DOCUMENTATION ONLY (OUTPATIENT)
Dept: FAMILY MEDICINE | Facility: CLINIC | Age: 24
End: 2019-08-21

## 2019-08-21 NOTE — PROGRESS NOTES
"When opening a documentation only encounter, be sure to enter in \"Chief Complaint\" Forms and in \" Comments\" Title of form, description if needed.    Rupinder is a 24 year old  female  Form received via: Fax (By Doctor)  Form now resides in:  for patient pick-up    Law Rochelle MA      Form has been completed by provider.     Form sent out via: Placed at  for patient  on 8/20/2019  Patient informed: No, Reason: Called patient so many time, but no one seem to pick the phone or call us back, so unable to inform the patient.   Output date: August 20, 2019    Law Rochelle MA      **Please close the encounter**              "

## 2019-09-05 ENCOUNTER — OFFICE VISIT (OUTPATIENT)
Dept: PSYCHOLOGY | Facility: CLINIC | Age: 24
End: 2019-09-05
Payer: MEDICAID

## 2019-09-05 DIAGNOSIS — F89 NEURODEVELOPMENTAL DISORDER: Primary | ICD-10-CM

## 2019-09-05 NOTE — Clinical Note
Blayne Brown, I tried to catch you today in clinic but I think we were both slammed and I didn't get a chance! I wanted to make sure to reach out about this patient - I know you have had some interaction with the family in the past but they are now getting brief care from our new child psychiatrist. Dr. Bartlett was hopeful that perhaps you might be able to provide additional insight into the patient's behavior at home as well as what sorts of service might be helpful. Dr. Porter or I will try to introduce you and Dr. Bartlett the next time you are both in clinic. Thanks for partnering with us!Nia Yang, PhDBehavioral Health Fellow

## 2019-09-05 NOTE — PROGRESS NOTES
"  ----------------------------------------------------------------------------------------------------------    Child & Adolescent Psychiatric Primary Care Consult Note     OUTPATIENT  CHILD & ADOLESCENT PSYCHIATRIC  DIAGNOSTIC  EVALUATION            90 minute evaluation    IDENTIFICATION   Rupinder Spaulding is a 24 year old female who prefers she/her pronouns.   Parents: Brynn Rojas, mother  Therapist: None  PCP: Reuben Mixon  Primary Care [facility]: [facility]   Referred by PCP for evaluation of ASD and recommendations for symptoms management.     Psych critical item history includes [no critical items].   History was provided by mother who was vague historian(s).    CHIEF COMPLAINT   Per patient: The patient does not speak during interview/exam.   Per parents: \" She is not able to do many things we want her to do, this is hard for her to get citizenship\"    HISTORY OF PRESENT ILLNESS     The initial portion of the interview was spent discussing the paperwork for citizenship that was completed by Dr. Mixon on 8/21 (verified per EMR) and left for family at .  Mother was not aware of this.  Mother's understanding was that she was seeing me today to receive this paperwork or have an evaluation necessary (?) prior to receiving this paperwork.   When mother understood that the paperwork was already done, she was not sure how  I might be of help though she was open to be gathering history.      Patient has completed special education high school.  When the family tried to get her enrolled in college they were informed that Rupinder would not be able \"to succeed in college\".  This led mom to take her to an adult education center to learn more English and mom was told at that school that Rupinder would \"not be able to follow the rules or succeed in that school either.\"  It is unclear if this is related to underlying developmental delay or if this is related to a language barrier for Rupinder.  Mom shares that this was very " "frustrating for both mom and Rupinder.   Following this, Rupinder determined she would like to return to [place] to see her relatves and for this reason, mom is seeking citizenship for Rupinder.  Moms states that Rupinder was raised by her grandmother (still in [place]) and Rupinder misses her greatly.  Mom feels it would be helpful for Rupinder to return to [place] for some period of time.       In High School, Rupinder had a  who helped her find her a job in the community.  Rupinder then decided she no longer wanted to work and mom reports this was a difficult period as Rupinder had to much \"free time\" and mom felt Rupinder had a depressed mood (though mom states Rupinder doesn't say she feels depressed), limited sleep, being angry without structure.      Now, Rupinder has a new PCA, and she \"seems happier, gets outside, engages in daily activities such as cooking, showering, and cleaning.\"  Mom states that the PCA helps Rupinder try new foods and encourages her to eat.  The PCA comes for five hours per day five days a week.        Mom reports currently that Rupinder seems much happier, has more energy though remains easily irritable by \"little things\" which is demonstrated by withdrawal and crying.   Mom also shares that sometimes she has \"tantrums\" like a \"little kid would have.\"  Mom states that the patient is triggered by situations when \"she doesn't get her way.\"  Examples include rigidity, \"if someone takes her pen she won't accept any other pen in the house and she will then have a tantrum.\"   Mom states these tantrums may last for hours -- though have decreased in duration since starting mirtazapine.     With respect to sleep, Rupinder has a variable sleep pattern.  Some nights she falls asleep at 0000 and others at 0300.  She generally wakes up at 0900.  She naps two hours per day.  Mom only sleeps when Rupinder is sleeping.  Rupinder apparently doesn't fall asleep at same time as rest of family (about 2230) \"because she is playing on her " "phone.\"  Mom states that when she places requests for phone, Rupinder becomes very angry so \"I just don't do it.\"  Quetiapine does help Rupinder sleep; however, Rupinder will only take quetiapine when she is ready to go to bed (not when mother asks her to take it.)      Mom reports that patient has never really talked, generally becomes angry if pressed to talk, has struggled with engaging in daily needs such as bathing, eating, getting dressed, etc her entire life.      When asked about past assessment/testing, mom reports that Rupinder's cognitive ability is about of a \"[age] year old\" following what sounds to be neuropsychological testing in [place].     Reviewed use of mirtazapine and quetiapine.  Mirtazapine has been used for at least two years and mom states that it has been very helpful for her sadness and anger.  Mom states that her \"tantrums\" have shortened in duration.  Mom denies current side effects with this medication.  The quetiapine has been very helpful for sleep.       Safety concerns are related to the patient having limited insight, cooking on her own, leaving the apartment during the night.  Mom is not concerned that Rupinder would engage in self-harm or is having thoughts of suicide.  These have not been concerns in the past.      Stressors/Changes/Losses: Primary caregiver [place] out of the country, limited community involvement    CURRENT SYMPTOMS include rigidity, irritability, disrupted sleep pattern, low appetite and borderline nutrition     PSYCHIATRIC REVIEW OF SYSTEMS:   MDD: Not Applicable and see HPI   Generalized Anxiety Disorder: Irritability, On the edge and Sleep disturbance   Obsessive-Compulsive Disorder   Obsession: denied per mother   Compulsion: denied per mother   Separation Anxiety (at least three of the following): denies sx, per mother   Psychosis: Not Applicable and denies sx, per mother    Eating Disorder Symptoms: longstanding history of limited oral intake, did not address today  " "  MEDICAL ROS   12 pt ROS completed; with exception of constipation, \"picky eating\" it was negative.     MEDICAL / SURGICAL HISTORY      Patient Active Problem List   Diagnosis     Mental deficiency     Autism spectrum disorder requiring very substantial support (level 3)     Slow transit constipation     Moderate malnutrition (H)     Vitamin D deficiency     Gastroesophageal reflux disease, esophagitis presence not specified     Weight loss     Mood disorder (H)     Developmental delay     Language barrier     Short stature     Undernutrition       No past surgical history on file.     ALLERGY & IMMUNIZATIONS     No Known Allergies    MEDICATIONS                                                                                                Current Outpatient Medications   Medication Sig     Calcium Carbonate Antacid 1177 MG CHEW Take 1 tablet (1,177 mg) by mouth 2 times daily as needed (Reflux)     fluticasone (FLONASE) 50 MCG/ACT nasal spray Spray 1-2 sprays into both nostrils daily     ibuprofen (CHILD IBUPROFEN) 100 MG/5ML suspension Take 20 mLs (400 mg) by mouth every 6 hours as needed for fever or moderate pain     loratadine (CLARITIN) 10 MG tablet Take 1 tablet (10 mg) by mouth daily     mirtazapine (REMERON) 45 MG tablet Take 1 tablet (45 mg) by mouth At Bedtime     multivitamin w/minerals (MULTI-VITAMIN) tablet Take 1 tablet by mouth daily     polyethylene glycol (MIRALAX) powder Take 17 g (1 capful) by mouth daily     QUEtiapine (SEROQUEL) 25 MG tablet Take 1 tablet (25 mg) by mouth At Bedtime     vitamin D3 (CHOLECALCIFEROL) 2000 units (50 mcg) tablet Take 1 tablet (2,000 Units) by mouth daily     No current facility-administered medications for this visit.      PSYCHIATRIC and CD HISTORY      PSYCHIATRIC:     Previous providers- none   Previous diagnosis:  Autism Spectrum Disorder, Developmental Delay, concerns related to OCD eating disordered eating patterns   CM:   PCA: five hours per day; Hoday likes " "her PCA   Psychosocial interventions: Case Mgmt; Celine with [facility] Services (don't have specific contact information)   Psych Hosp [ #, most recent, committed]- none   Past medication trials: mirtazapine, quetiapine     Per mother:   SIB [method, most recent]- never  Suicidal Ideation Hx [passive, active]- denies  Suicide Attempt [#, most recent, method, regret, disclosure, require medical]- denies  Violence/Aggression Hx- denies    CHEMICAL DEPENDENCY:      N/A     DEVELOPMENTAL / BIRTH HISTORY:   Mother reports that Rupinder has always been delayed in her milestones.  We do not have specific information about birth history.      SOCIAL HISTORY                                                   Patient Reported    Patient currently lives with her mother, step-father (uncle), and six 1/2 siblings.    Jewish/Spirituality: Christianity   Hobbies: playing on phone    Trauma history (self-report)- did not assess     SCHOOL:  Completed High School in a \"special education school,\" not currently in school     FAMILY PSYCHIATRIC HISTORY:   Per mother, there is no significant family psychiatric history     FAMILY MEDICAL HISTORY:     Family History   Problem Relation Age of Onset     Diabetes No family hx of      Coronary Artery Disease No family hx of      Hypertension No family hx of      Hyperlipidemia No family hx of      Cerebrovascular Disease No family hx of      Breast Cancer No family hx of        VITALS   There were no vitals taken for this visit.    MENTAL STATUS EXAM                                                                          Alertness: alert   Appearance: adequately groomed  Behavior/Demeanor: calm, with poor eye contact  Speech: spoke only two words during duration of 60 minute interview  Language: unable to assess  Interaction: was calm during interview though did not engage in conversation; at ~ 60 minutes of our interview she said \"no, no\" which prompted mom to state that we would need to wrap-up " our visit; patient remained calm until visit was concluded  Psychomotor: sits in chair, frequently puts head down on desk   Mood:  unable to assess  Affect: indifferent; was congruent to content (patient did laugh appropriately once during visit)   Thought Process/Associations: unable to assess; appeared to follow flow of interview  Thought Content: Per mom, patient has not had  suicidal ideation, violent ideation and obsessions   Perception: Per mom, patient has not reported auditory hallucinations and visual hallucinations  Insight: unable to assess  Judgment: adequate for safety  Cognition: does not appear grossly intact; formal cognitive testing was not done  Memory: recent/remote: not able to assess; per mother Rupinder's memory is poor   Fund of knowledge: difficult to assess; per history this is below expected for age    LABS                                                                                                                    Recent Labs   Lab Test 07/09/19  1047   HGB 12.5   HCT 40.7   MCV 88.5     Recent Labs   Lab Test 07/09/19  1047   .9   POTASSIUM 3.9   CHLORIDE 105.5   CO2 26.2   GLC 97.2   KAYLIE 9.5   BUN 13.5   CR 0.7   GFRESTIMATED >90   PROTTOTAL 7.5   AST 15.3   ALT 10.1   ALKPHOS 52.4   BILITOTAL <0.4     PSYCHOLOGICAL TESTING:   CAGE-AID completed per mother's report; patient did not engage verbally during visit.   CAGE-AID Total Score 9/24/2019   Total Score 0       CAGE-AID score  > 1 is a positive screen, suggesting further discussion is needed to determine if evaluation for alcohol or substance abuse is appropriate.  A score > 2 is considered clinically significant, suggesting further evaluation of alcohol or substance-related problems is indicated.        ASSESSMENT     ID: Rupinder is a 25 yo young woman with a past history of ASD with associated developmental delay and concern for a disordered eating pattern.  She was referred to child & adolescent psychiatry for a consult to  assess diagnosis and suggestions for symptom management moving forward.    Rupinder presents with a longstanding history of rigidity, difficulty with transitions, irritability, sleep disruption, limited eating patterns, and difficulty engaging in social situations which may be related to her past autism spectrum diagnosis.  Per mother, she has significant developmental delay with a past (presumed) neuropsychological assessment revealing a cognitive age of approximately [age] years old though I suspect it may be higher than this based on how it appears she functions in her daily routine.   Further diagnostic testing and collaboration with other providers on Rupinder's team will be necessary to discern this.   At this time, her differential diagnosis is broad and includes neurodevelopmental disorders, OCD, eating disorder, mood disorders, and anxiety disorders.  Her history does not seem suggestive of psychotic thought process.  While diagnostic clarification is being sorted, target symptoms will be disordered sleep and irritability.    Per discussion with Stephanie Presley, the patient has a  who has been involved with the patient for many years and helps coordinate PCA services, or which the patient receives five hours per day.   Further coordination with the patient's  will be necessary to better understand the timeline of symptom development for Rupinder, the services that have been and are currently present for Rupinder, and what Rupinder's functional capabilities are.  Additionally, it would be ideal to obtain outside records from [facility] and her prior NP testing.      Safety: The patient does not have a history of self-harm or suicide thoughts or behaviors. Per mom today, there are not acute safety concerns.   At this time, outpatient level care is appropriate for this patient.     PSYCHOTROPIC DRUG INTERACTION CHECK was remarkable for:    none    PSYCHOTROPIC DRUG MONITORING:  [place] Prescription Monitoring  Program [] review was not needed today.  PSYCHOTROPIC DRUG INTERACTIONS: none clinically relevant    DIAGNOSES/PLAN                                                                                                      PRINCIPAL DIAGNOSIS:  Unspecified Neurodevelopmental Disorder; Autism Spectrum Disorder and Developmental Delay are historical diagnosis   Plan:  1. Suggest obtaining Neuropsychological testing for both diagnostic clarification and better understanding of what services will be more useful for Rupinder moving forward.  patrick Presley will call patient/family to discuss why NP testing is recommended at this time.    2. Obtain outside records from [facility]; obtain past NP testing, if possible; ROIs will need to be completed at follow-up visit.   3. Mother has been asked to identify primary symptom for us to work together to target at next visit; will consider irritability, sleep, and rigidity.    4. Continue current medications at this this time; will assess further at next visit.    a. Patient due for SGA labs if quetiapine is continued     CONTRIBUTING MEDICAL DIAGNOSIS: Weight loss with disordered eating pattern; concern for anorexia v. OCD in past; Vitamin D Deficiency   Plan:   1. On-going assessment; will obtain additional history at next visit  2. Vitals need to be obtained at next visit; unable to obtain following visit today as patient requested to leave  3. Continue taking Vitamin D3 2000 units daily; follow-up with PCP; target is ~ 50 micrograms/L    TREATMENT RISK STATEMENT:    We discussed the risks and benefits of the medication(s) mentioned above, including precautions, drug interactions and/or potential side effects/adverse reactions. Specific precautions, interactions and side effects discussed included, but were not limited to: sedation, metabolic side effects. The patient and/or guardian verbalized understanding of the risks and consented to treatment with the capacity to do so.  The  pt  and pt's parent(s)/guardian knows to call the clinic for any problems or access emergency care if needed.    RTC: ASAP for 60 minute visit     Marlen Bartlett MD, FAAP  Child & Adolescent Psychiatry    Due to patient being non-English speaking/uses sign language, an  was used for this visit in order to obtain history and complete an assessment. Only for face-to-face interpretation by an external agency, date and length of interpretation can be found on the scanned worksheet.  Language: Czech   Type of interpretation: Face-to-face, spoken

## 2019-09-16 ENCOUNTER — CARE COORDINATION (OUTPATIENT)
Dept: FAMILY MEDICINE | Facility: CLINIC | Age: 24
End: 2019-09-16

## 2019-09-16 NOTE — PROGRESS NOTES
Rupnider Spaulding is a 24 year old woman who was reached out to by the CHW to discuss the possibility of getting Rupinder assessed to help better understand her capabilities, the last assessment she had was few years age. CHW discussed the benefits of the assessment with pt's mother and she stated that she believes her daughter receives some form of an assessment every 6 months at [facility]. CHW discussed with the pt's mother reddy eldridge if she believed her daughter can use some additional resources and she stated she has a  assisting her with getting resources. The  currently working with Rupinder is Celine and her contact information was provided by reddy eldridge. Celine is a  for a waiver and her number is [phone].

## 2019-09-26 ENCOUNTER — CARE COORDINATION (OUTPATIENT)
Dept: PSYCHOLOGY | Facility: CLINIC | Age: 24
End: 2019-09-26

## 2019-09-26 NOTE — PROGRESS NOTES
"Called Celine; patient's Waiver  with [facility] Services; contracted through [facility].  Goal of conversation was to gather additional information about patient's background, services, and functional capacity in the community.    Start time: 13:15  End time: 13:55    Celine is new to this patient's case within the last year.  When she met Rupinder, Rupinder was not receiving any services.  Parents were spending their days supporting Celine at home. Celine had her assessed and patient qualified for a county waiver through [facility].  She now receives the following supports:    Personal Support: 2-3 hours per day   Respite Support: 10 hours per week  PCA Support: 4 hours per day     Celine's ultimate goal is for Rupinder to \"learn a skill and by employed.\"  She is unclear what types of skill Rupinder would excel at.  Celine feels that many of Rupinder's behaviors (pushing boundaries, yelling, low mood, irritability) are related to the fact that Rupinder would like to be engaged in the community - working, etc. The limiting factor in enrolling Rupinder a program that would help her transition to employment is that she is not fully conversant in English.  Celine has attempted to enroll Rupinder in English courses; however, she would require a 1:1 support person for learning purposes and this has not been approved by the English learning centers Celine has explored.      Celine has a school assessment from 2015 completed by the school psychologist with a diagnosis of \"severe intellectual disability\" and will fax this to [facility] for our review.      Celine fully supports neuropsychological testing for Rupinder and has tried to encourage this in the past.  She agrees that the plan moving forward will be to have a NP assessment be completed. Will involve clinic CHW, tSephanie, to help communicate with family.     Marlen Bartlett MD  Child & Adolescent Psychiatry             "

## 2019-10-03 ENCOUNTER — DOCUMENTATION ONLY (OUTPATIENT)
Dept: PSYCHOLOGY | Facility: CLINIC | Age: 24
End: 2019-10-03

## 2019-10-03 NOTE — PROGRESS NOTES
"Review of Evaluation Report from [facility] [employer] Services:    Report completed 1/21/2015:    Review of patient social history:  Born in [place]; came to the [place] in [place].   Patient [place].   Began school at age 8    [place] initiated school at [employer] in [place], 9th grade placement  IEP for adjustment disorder and \"other health impairment\" all of which led to 10 hours per week in a separate classroom   Per assessment there, she apparently was learning  level skills such as fine motor skills    Enrolled at [employer] in 9/2014; started 10th grade   Note that her limited \"capability to learn was more functionally impairing than her culture or language\"  Noted to struggle with basic orientation of school routine and the basic necessary skills for learning  Grades were adapted to be \"credit received\" so that effort rather than achievement was rewarded.     Per family, her father reports that she has always had behavior problems and has been very dependent on family for all of her personal needs and community access.      Testing included:  1/23/15: Comprehensive Test of Nonverbal Intelligence (CTONI-II)  1/12/15: ABAS-II (falls below 1%)  1/13/2015: WAIS-IV (perceptual scale only)    1/13/15: Analysis of Student Work  -approximately 85% of her work was not filled out  -approximately 10% of her work had her name on it but was not filled out  -approximately 5% of her work was completed but had not been turned in  -when the above was discussed with Rupinder she acknowledged lack of clarity about who should receive the homework and when the homework was due; \"expressed desire to complete it but not able to articulate how to do so\"    History obtained notes that patients \"behaviors\" started at age 4.  Would not eat for up to 3 days at a time.  Did have special education report from 2014 " "which noted patient had \"adjustment disorder.\"    "

## 2019-10-25 ENCOUNTER — CARE COORDINATION (OUTPATIENT)
Dept: FAMILY MEDICINE | Facility: CLINIC | Age: 24
End: 2019-10-25

## 2019-10-25 NOTE — PROGRESS NOTES
Dr. Bartlett, child psych, asked for SW assistance in referring to [facility] for a Neuropsych evaluation. SW talked to [facility] core services. They report they do provide this services from people who are not minors on a case by case basis. Pt's age and estimated functioning age was provided without identifying the client and based on the the [facility] representative thinks it is likely they would be able to provide services.     Referral Process: A referral form (found on website) needs to be filled out with the pt's information and faxed in (fax # [phone]). Once they receive the fax they will consult with their psychologist. If they determine they are able to do the eval they will add the family to their waiting list (currently at about 6 months long). They will be in contact with the family after that to schedule and arrange. The use of an  will not change the process or time line at all.     Message routed to Dr. Bartlett to coordinate referral with [facility]

## 2019-11-01 ENCOUNTER — OFFICE VISIT (OUTPATIENT)
Dept: FAMILY MEDICINE | Facility: CLINIC | Age: 24
End: 2019-11-01
Payer: MEDICAID

## 2019-11-01 VITALS
WEIGHT: 76.4 LBS | HEART RATE: 77 BPM | OXYGEN SATURATION: 96 % | SYSTOLIC BLOOD PRESSURE: 110 MMHG | HEIGHT: 59 IN | BODY MASS INDEX: 15.4 KG/M2 | DIASTOLIC BLOOD PRESSURE: 76 MMHG | TEMPERATURE: 98.2 F

## 2019-11-01 DIAGNOSIS — E55.9 VITAMIN D DEFICIENCY: ICD-10-CM

## 2019-11-01 DIAGNOSIS — J30.1 CHRONIC ALLERGIC RHINITIS DUE TO POLLEN: ICD-10-CM

## 2019-11-01 DIAGNOSIS — R63.4 WEIGHT LOSS: Primary | ICD-10-CM

## 2019-11-01 DIAGNOSIS — R52 PAIN: ICD-10-CM

## 2019-11-01 RX ORDER — LORATADINE 10 MG/1
10 TABLET ORAL DAILY
Qty: 90 TABLET | Refills: 3 | Status: SHIPPED | OUTPATIENT
Start: 2019-11-01 | End: 2021-04-26

## 2019-11-01 RX ORDER — CHOLECALCIFEROL (VITAMIN D3) 50 MCG
2000 TABLET ORAL DAILY
Qty: 90 TABLET | Refills: 3 | Status: SHIPPED | OUTPATIENT
Start: 2019-11-01 | End: 2021-02-02

## 2019-11-01 RX ORDER — FLUTICASONE PROPIONATE 50 MCG
1-2 SPRAY, SUSPENSION (ML) NASAL DAILY
Qty: 16 G | Refills: 3 | Status: SHIPPED | OUTPATIENT
Start: 2019-11-01 | End: 2021-04-26

## 2019-11-01 RX ORDER — IBUPROFEN 400 MG/1
400 TABLET, FILM COATED ORAL EVERY 6 HOURS PRN
Qty: 100 TABLET | Refills: 1 | Status: SHIPPED | OUTPATIENT
Start: 2019-11-01 | End: 2021-04-26

## 2019-11-01 ASSESSMENT — MIFFLIN-ST. JEOR: SCORE: 999.24

## 2019-11-01 ASSESSMENT — PATIENT HEALTH QUESTIONNAIRE - PHQ9: SUM OF ALL RESPONSES TO PHQ QUESTIONS 1-9: 0

## 2019-11-01 NOTE — NURSING NOTE
Due to patient being non-English speaking/uses sign language, an  was used for this visit. Only for face-to-face interpretation by an external agency, date and length of interpretation can be found on the scanned worksheet.     name: Sean Salinas  Agency: [employer]  Language: Egyptian   Telephone number: [phone]  Type of interpretation: Face-to-face, spoken

## 2019-11-01 NOTE — PROGRESS NOTES
"       HPI       Rupinder Spaulding is a 24 year old  who presents for   Chief Complaint   Patient presents with     RECHECK     weight         Weight Check   Patient presents for weight check. Her weight has been followed for the past several months. Currently stable. She reports appetite is fine. Per patient's mother her appetite waxes and wanes. Interviewed independently from mom and denies any high risk eating habits or eating restriction. No further insight and minimal answers both with mom and independently.        Refill Request  Allergies   Reports seasonal allergies that are currently managed with current regimen. Denies any outstanding sneezing, itchy eyes or cough. Would like to continue current regimen  Ibuprofen    Used for as needed pain.      A Rev  was used for  this visit.      Problem, Medication and Allergy Lists were reviewed and updated if needed..    Patient is an established patient of this clinic..         Review of Systems:   Review of Systems  10 point ROS neg other than the symptoms noted above in the HPI.         Physical Exam:     Vitals:    11/01/19 1124   BP: 110/76   Pulse: 77   Temp: 98.2  F (36.8  C)   TempSrc: Oral   SpO2: 96%   Weight: 34.7 kg (76 lb 6.4 oz)   Height: 1.486 m (4' 10.5\")     Body mass index is 15.7 kg/m .  Vitals were reviewed and were normal     Physical Exam  Constitutional:       Comments: Thin appearing, brief 1-3 word answers after a lot of encouragement, tension noted between patient and mother   HENT:      Nose: No congestion or rhinorrhea.      Mouth/Throat:      Mouth: Mucous membranes are moist.      Pharynx: Oropharynx is clear.   Cardiovascular:      Rate and Rhythm: Normal rate and regular rhythm.      Pulses: Normal pulses.   Pulmonary:      Effort: Pulmonary effort is normal.   Abdominal:      General: Abdomen is flat. Bowel sounds are normal. There is no distension.      Palpations: Abdomen is soft.      Tenderness: There is no tenderness. "   Skin:     General: Skin is warm and dry.   Psychiatric:      Comments: Flat affect             Results:   No testing ordered today    Assessment and Plan        Rupinder was seen today for recheck.    Weight Loss  Patient presents for weight check and is stable. Encouraged to follow up with Dr. Mixon to see if able to start spacing out follow up visits.     Pain  Medication refill. No acute concerns.   -     ibuprofen (ADVIL/MOTRIN) 400 MG tablet; Take 1 tablet (400 mg) by mouth every 6 hours as needed for moderate pain  -      : Sign Language or Oral - 23-37 minutes    Chronic allergic rhinitis due to pollen  Reports good management on current regimen. No adjustments today. Year refill provided.   -     fluticasone (FLONASE) 50 MCG/ACT nasal spray; Spray 1-2 sprays into both nostrils daily  -     loratadine (CLARITIN) 10 MG tablet; Take 1 tablet (10 mg) by mouth daily  -      : Sign Language or Oral - 23-37 minutes    Vitamin D deficiency  Low vitamin D, but not deficient. Encouraged daily Vitamin D supplementation.   -     vitamin D3 (CHOLECALCIFEROL) 2000 units (50 mcg) tablet; Take 1 tablet (2,000 Units) by mouth daily  -      : Sign Language or Oral - 23-37 minutes             Medications Discontinued During This Encounter   Medication Reason     ibuprofen (CHILD IBUPROFEN) 100 MG/5ML suspension Alternate therapy     fluticasone (FLONASE) 50 MCG/ACT nasal spray Reorder     loratadine (CLARITIN) 10 MG tablet Reorder     vitamin D3 (CHOLECALCIFEROL) 2000 units (50 mcg) tablet Reorder       Options for treatment and follow-up care were reviewed with the patient. Rupinder Spaulding  engaged in the decision making process and verbalized understanding of the options discussed and agreed with the final plan.    Chayo Mcintosh MD  [facility] Resident PGY-3  [phone]

## 2019-11-01 NOTE — PROGRESS NOTES
Preceptor Attestation:   Patient seen, evaluated and discussed with the resident. I have verified the content of the note, which accurately reflects my assessment of the patient and the plan of care.   Supervising Physician:  Domenico Sparks MD

## 2019-11-11 ENCOUNTER — CARE COORDINATION (OUTPATIENT)
Dept: FAMILY MEDICINE | Facility: CLINIC | Age: 24
End: 2019-11-11

## 2019-11-11 NOTE — PROGRESS NOTES
reddy eldridge was reached out to by the CHW to get the pt to schedule a time to come in clinic to sign her referral to [facility] for an assessment. Pt's mother didn't seem to understand what the assessment was for and insisted an assessment was done by her . CHW explained to the pt's mother reddy eldridge that this assessment is different from what her  has done and explained it was an assessment to determine where the pt is with her condition since it's a mental condition and Rupinder is nonverbal she would need a neurological assessment to better her care. Pt's mother still wasn't understanding what the assessment was for and CHW agreed to see her in clinic the next time she is here to ensure her understanding of the assessment. CHW is unsure about how the pt's mother agreed to the assessment previously when she demonstrated a lack of understanding.    Stephanie Presley, November 11, 2019 at 3:17 PM

## 2020-01-24 ENCOUNTER — OFFICE VISIT (OUTPATIENT)
Dept: FAMILY MEDICINE | Facility: CLINIC | Age: 25
End: 2020-01-24
Payer: MEDICAID

## 2020-01-24 VITALS
HEART RATE: 86 BPM | HEIGHT: 58 IN | OXYGEN SATURATION: 98 % | WEIGHT: 74 LBS | BODY MASS INDEX: 15.54 KG/M2 | DIASTOLIC BLOOD PRESSURE: 74 MMHG | SYSTOLIC BLOOD PRESSURE: 108 MMHG | TEMPERATURE: 97.9 F | RESPIRATION RATE: 16 BRPM

## 2020-01-24 DIAGNOSIS — E44.0 MODERATE MALNUTRITION (H): ICD-10-CM

## 2020-01-24 DIAGNOSIS — N89.8 VAGINAL DISCHARGE: ICD-10-CM

## 2020-01-24 DIAGNOSIS — Z13.9 SCREENING FOR CONDITION: Primary | ICD-10-CM

## 2020-01-24 DIAGNOSIS — Z23 NEED FOR PROPHYLACTIC VACCINATION AND INOCULATION AGAINST INFLUENZA: ICD-10-CM

## 2020-01-24 LAB
BACTERIA: NORMAL
BILIRUBIN UR: NEGATIVE MG/DL
BLOOD UR: NEGATIVE MG/DL
BUN SERPL-MCNC: 12.7 MG/DL (ref 7–19)
CALCIUM SERPL-MCNC: 9.4 MG/DL (ref 8.5–10.1)
CHLORIDE SERPLBLD-SCNC: 102.1 MMOL/L (ref 98–110)
CLUE CELLS: NORMAL
CO2 SERPL-SCNC: 24.3 MMOL/L (ref 20–32)
CREAT SERPL-MCNC: 0.5 MG/DL (ref 0.5–1)
GFR SERPL CREATININE-BSD FRML MDRD: >90 ML/MIN/1.7 M2
GLUCOSE SERPL-MCNC: 104.8 MG'DL (ref 70–99)
GLUCOSE URINE: NEGATIVE
HCT VFR BLD AUTO: 42.1 % (ref 35–47)
HEMOGLOBIN: 12.4 G/DL (ref 11.7–15.7)
KETONES UR QL: NEGATIVE MG/DL
LEUKOCYTE ESTERASE UR: NEGATIVE
MCH RBC QN AUTO: 26.2 PG (ref 26.5–35)
MCHC RBC AUTO-ENTMCNC: 29.5 G/DL (ref 32–36)
MCV RBC AUTO: 88.8 FL (ref 78–100)
MOTILE TRICHOMONAS: NEGATIVE
NITRITE UR QL STRIP: NEGATIVE MG/DL
ODOR: NORMAL
PH UR STRIP: 6 [PH] (ref 4.5–8)
PH WET PREP: <4.5 (ref 3.8–4.5)
PLATELET # BLD AUTO: 204 K/UL (ref 150–450)
POTASSIUM SERPL-SCNC: 3.5 MMOL/L (ref 3.3–4.5)
PROTEIN UR: ABNORMAL MG/DL
RBC # BLD AUTO: 4.74 M/UL (ref 3.8–5.2)
SODIUM SERPL-SCNC: 137.4 MMOL/L (ref 132.6–141.4)
SP GR UR STRIP: 1.02 (ref 1–1.03)
UROBILINOGEN UR STRIP-ACNC: ABNORMAL E.U./DL
WBC # BLD AUTO: 3.7 K/UL (ref 4–11)
WBC WET PREP: NORMAL (ref 2–5)
YEAST: NORMAL

## 2020-01-24 ASSESSMENT — ENCOUNTER SYMPTOMS
ABDOMINAL DISTENTION: 0
ACTIVITY CHANGE: 0
FLANK PAIN: 0
BACK PAIN: 0
FEVER: 0
NAUSEA: 0
SORE THROAT: 0
DIFFICULTY URINATING: 0
DIARRHEA: 0
VOMITING: 0
MYALGIAS: 0
FREQUENCY: 0
RHINORRHEA: 0
CONSTIPATION: 0
DYSURIA: 0
ABDOMINAL PAIN: 0
HEADACHES: 0
BLOOD IN STOOL: 0
COUGH: 0
PALPITATIONS: 1
SHORTNESS OF BREATH: 0
WOUND: 0
EYE ITCHING: 0
HEMATURIA: 0
NUMBNESS: 0
EYE DISCHARGE: 0
JOINT SWELLING: 0
EYE REDNESS: 0
ARTHRALGIAS: 0
FATIGUE: 1
WHEEZING: 0
DIZZINESS: 0

## 2020-01-24 ASSESSMENT — MIFFLIN-ST. JEOR: SCORE: 982.79

## 2020-01-24 NOTE — NURSING NOTE
Due to patient being non-English speaking/uses sign language, an  was used for this visit. Only for face-to-face interpretation by an external agency, date and length of interpretation can be found on the scanned worksheet.     name: Sean Salinas  Agency: [employer]  Language: Citizen of [place]   Telephone number: [phone]  Type of interpretation: Face-to-face, spoken    Cass De La Garza CMA on 1/24/2020 at 1:50 PM

## 2020-01-24 NOTE — PROGRESS NOTES
"       HPI       Rupinder Spaulding is a 25 year old  who presents for   Chief Complaint   Patient presents with     RECHECK     weight check, mom is concerned she is not eating at all x 2 weeks     Abdominal Pain     When she has her period she has very severe lower abdominal cramps      Urinary Problem     Frequency and itching x 2 weeks     Imm/Inj     Flu Shot     Weight Check   -drinks 6 bottles of water/day ; eats 3 meals: oatmeal (breakfast), rice/chicken or fish (lunch), noodles (dinner)  -mom states only eats tiny portion of each meal  -used to drink pediasure, has refused in last 2wks (reports due to menses cramps inducing poor appetite)  -negative for nausea/vomiting/constipation/diarrhea/abdominal pain; + for menstrual cramps/daily fatigue  -normal frequency of urination (2-4x/day; no dysuria/hematuria; \"warmness\" reported unsure about burning)  -1yr of white/clear thin discharge with itching (menstruation began at 14yo); pap smear done today   -LMP: 1/18 (lasted 6 days; no clots/abnormal discharge during menses; menses are regular)    -mom reports pt occasionally complains of palpitations while walking/stairs but pt denies  -exercises by walking stairs ~30min/day    A teextee  was used for  this visit.    teextee     Problem, Medication and Allergy Lists were reviewed and updated if needed..    Patient is an established patient of this clinic..         Review of Systems:   Review of Systems   Constitutional: Positive for fatigue. Negative for activity change and fever.   HENT: Negative for congestion, ear discharge, ear pain, mouth sores, nosebleeds, rhinorrhea and sore throat.    Eyes: Negative for discharge, redness, itching and visual disturbance.   Respiratory: Negative for cough, shortness of breath and wheezing.    Cardiovascular: Positive for palpitations. Negative for chest pain and leg swelling.   Gastrointestinal: Negative for abdominal distention, abdominal pain, blood in stool, " "constipation, diarrhea, nausea and vomiting.   Genitourinary: Positive for vaginal discharge. Negative for difficulty urinating, dysuria, flank pain, frequency, hematuria and pelvic pain.   Musculoskeletal: Negative for arthralgias, back pain, joint swelling and myalgias.   Skin: Negative for rash and wound.   Neurological: Negative for dizziness, syncope, numbness and headaches.   Psychiatric/Behavioral:        Slightly blunted mood; appears tired/fatigued            Physical Exam:     Vitals:    01/24/20 1350   BP: 108/74   BP Location: Left arm   Patient Position: Sitting   Cuff Size: Adult Small   Pulse: 86   Resp: 16   Temp: 97.9  F (36.6  C)   TempSrc: Oral   SpO2: 98%   Weight: 33.6 kg (74 lb)   Height: 1.485 m (4' 10.47\")     Body mass index is 15.22 kg/m .  Vitals were reviewed and were normal     Physical Exam  Exam conducted with a chaperone present.   Constitutional:       General: She is not in acute distress.  HENT:      Head: Normocephalic.      Nose: No congestion or rhinorrhea.      Mouth/Throat:      Pharynx: No oropharyngeal exudate.   Eyes:      General: No scleral icterus.     Extraocular Movements: Extraocular movements intact.      Conjunctiva/sclera: Conjunctivae normal.      Pupils: Pupils are equal, round, and reactive to light.   Cardiovascular:      Rate and Rhythm: Normal rate and regular rhythm.      Heart sounds: Normal heart sounds.   Pulmonary:      Breath sounds: Normal breath sounds. No wheezing.   Abdominal:      General: There is no distension.   Genitourinary:     Pubic Area: No rash.       Labia:         Right: No rash or tenderness.         Left: No rash or tenderness.       Urethra: No prolapse, urethral pain or urethral swelling.      Comments: Female circumcision present  Used smallest speculum available; mucus-like white discharge present along lower outer rim of labia minora bilaterally  Musculoskeletal:         General: No swelling, tenderness, deformity or signs of " "injury.      Right lower leg: No edema.      Left lower leg: No edema.   Skin:     Findings: No lesion or rash.   Neurological:      General: No focal deficit present.      Mental Status: She is alert and oriented to person, place, and time.      Gait: Gait normal.          Results:   Results are ordered and pending    Assessment and Plan      Assessment: Rupinder is a 25yoF with PMH moderate malnutrition, Autism spectrum disorder, and developmental delay who presents with BMI 15.22 (mildly decreased from her measurement on 11/1/2019) and concerns of especially low appetite during periods of menstrual cramps. She has no constipation/nausea/vomiting/hematuria, but states she has a \"warmness upon urination\" and 1yr of white/thin/clear vaginal discharge with itching.    -\"warmness upon urination\": possible for UTI or normal given lack of dysuria/hematuria   -white/thin/clear vaginal discharge: most likely BV given consistency/color, cervicitis from chlamydia/gonorrhea possible given consistency/color of discharge, trichomonas less likely given lack of yellow/green color), candidiasis less likely given lack of curdy white discharge   -low BMI: decrease likely due to poorer appetite than usual (uable to drink pediasure) with recent menstruation cramps, also could be complicated by ASD symptoms (potential aversion to textures/smells/particular foods), anorexia nervosa also on differential, as are structural causes of pain/poor appetite with menstrual cramps (ie. Endometriosis (however this is unlikely given her regular periods that are not heavy) or PID (unlikely given normal/low WBC count)    1. Screening for condition  - Urinalysis, Micro If (UA) ([facility])  - Pap imaged thin layer screen only - recommended age 21 - 24 years    2. Moderate malnutrition (H)  - CBC with Plt ([facility])  - Basic Metabolic Panel ([facility])    3. Vaginal discharge  - Wet Prep ([facility])    4. Need for prophylactic vaccination and inoculation " against influenza  - Fluzone quad, preserve-free/prefilled  0.5ml, 6+ months [79668]     Plan:   -U/A for burning urination  -CBC/BMP for hgb and electrolyte check   -pap smear + wet prep completed today  -continue monitoring weight with regular f/u and encourage eating foods high in nutrition (pediasure now that no menstrual periods, consider trying different foods to see if texture may be an issue); consider f/u consultation with behavioral health for additional resources     There are no discontinued medications.    Shira Robertson, MS3    Options for treatment and follow-up care were reviewed with the patient. Rupinder Spaulding  engaged in the decision making process and verbalized understanding of the options discussed and agreed with the final plan.    Resident/Fellow Attestation   I, Khadijah Vasquez, was present with the medical student who participated in the service and in the documentation of the note.  I have verified the history and personally performed the physical exam and medical decision making.  I agree with the assessment and plan of care as documented in the note.      Khadijah Vasquez, DO  PGY3

## 2020-01-24 NOTE — LETTER
February 6, 2020    Rupinder Spaulding  [address]  [facility] [facility] [address]    Dear Rupinder,  Thank you for getting your care at [facility]. Please see below for your test results.    Resulted Orders   Urinalysis, Micro If (UA) ([facility])   Result Value Ref Range    Specific Gravity Urine 1.025 1.005 - 1.030    pH Urine 6.0 4.5 - 8.0    Leukocyte Esterase UR Negative NEGATIVE    Nitrite Urine Negative NEGATIVE mg/dL    Protein UR 2+ (A) NEGATIVE mg/dL    Glucose Urine Negative NEGATIVE    Ketones Urine Negative NEGATIVE mg/dL    Urobilinogen mg/dL 0.2 E.U./dL 0.2 E.U./dL E.U./dL    Bilirubin UR Negative NEGATIVE mg/dL    Blood UR Negative NEGATIVE mg/dL   CBC with Plt ([facility])   Result Value Ref Range    WBC 3.7 (L) 4.0 - 11.0 K/uL    RBC 4.74 3.80 - 5.20 M/uL    Hemoglobin 12.4 11.7 - 15.7 g/dL    Hematocrit 42.1 35.0 - 47.0 %    MCV 88.8 78.0 - 100.0 fL    MCH 26.2 (L) 26.5 - 35.0 pg    MCHC 29.5 (L) 32.0 - 36.0 g/dL    Platelets 204.0 150.0 - 450.0 K/uL   Basic Metabolic Panel ([facility])   Result Value Ref Range    Calcium 9.4 8.5 - 10.1 mg/dL    Chloride 102.1 98.0 - 110.0 mmol/L    Carbon Dioxide 24.3 20.0 - 32.0 mmol/L    Creatinine 0.5 0.5 - 1.0 mg/dL    Glucose 104.8 (H) 70.0 - 99.0 mg'dL    Potassium 3.5 3.3 - 4.5 mmol/L    Sodium 137.4 132.6 - 141.4 mmol/L    GFR Estimate >90 >60.0 mL/min/1.7 m2    GFR Estimate If Black >90 >60.0 mL/min/1.7 m2    Urea Nitrogen 12.7 7.0 - 19.0 mg/dL   Wet Prep ([facility])   Result Value Ref Range    Yeast Wet Prep None none    Motile Trichomonas Wet Prep Negative Negative    Clue Cells Wet Prep None NONE    WBC WET PREP None 2 - 5    Bacteria Wet Prep None None    pH Wet Prep <4.5 3.8 - 4.5    Odor Wet Prep None NONE   Pap imaged thin layer screen only - recommended age 21 - 24 years   Result Value Ref Range    PAP NIL     Copath Report         Acc#: [id number]   Signed: 1/29/2020 10:48   MR#: [id number]    SPECIMEN/STAIN PROCESS:  Pap imaged thin layer prep screening  (Surepath, FocalPoint with guided   screening)       Pap-Cyto x 1    SOURCE: Cervical, endocervical  ----------------------------------------------------------------   Pap imaged thin layer prep screening (Surepath, FocalPoint with guided   screening)  SPECIMEN ADEQUACY:  Satisfactory for evaluation.  -Transformation zone component present.    CYTOLOGIC INTERPRETATION:    Negative for intraepithelial lesion or malignancy    Electronically signed by:  MUNA Hull (ASCP)    CLINICAL HISTORY:  LMP: 1/18/2020    Papanicolaou Test Limitations:  Cervical cytology is a screening test with   limited sensitivity; regular  screening is critical for cancer prevention; Pap tests are primarily   effective for the diagnosis/prevention of  squamous cell carcinoma, not adenocarcinomas or other cancers.    The technical component of this testing was completed at the [facility], with the professional component performed   at the [facility], [address] [place] [phone] ([phone])         If you have any concerns about these results please call and leave a message for me or send a MyChart message to the clinic.    Sincerely,    Khadijah Vasquez, DO

## 2020-01-24 NOTE — PROGRESS NOTES
Preceptor Attestation:   Patient seen, evaluated and discussed with the resident. I have verified the content of the note, which accurately reflects my assessment of the patient and the plan of care.   Supervising Physician:  Ina Dowell MD

## 2020-01-29 LAB
COPATH REPORT: NORMAL
PAP: NORMAL

## 2020-02-28 ENCOUNTER — OFFICE VISIT (OUTPATIENT)
Dept: FAMILY MEDICINE | Facility: CLINIC | Age: 25
End: 2020-02-28
Payer: MEDICAID

## 2020-02-28 VITALS
BODY MASS INDEX: 15.7 KG/M2 | HEART RATE: 86 BPM | RESPIRATION RATE: 14 BRPM | DIASTOLIC BLOOD PRESSURE: 69 MMHG | OXYGEN SATURATION: 97 % | SYSTOLIC BLOOD PRESSURE: 96 MMHG | WEIGHT: 74.8 LBS | TEMPERATURE: 98.8 F | HEIGHT: 58 IN

## 2020-02-28 DIAGNOSIS — L21.9 SEBORRHEIC DERMATITIS: ICD-10-CM

## 2020-02-28 DIAGNOSIS — R23.4 FLAKING OF SCALP: Primary | ICD-10-CM

## 2020-02-28 RX ORDER — KETOCONAZOLE 20 MG/ML
SHAMPOO TOPICAL DAILY PRN
Qty: 100 ML | Refills: 0 | Status: SHIPPED | OUTPATIENT
Start: 2020-02-28 | End: 2021-02-02

## 2020-02-28 ASSESSMENT — MIFFLIN-ST. JEOR: SCORE: 983.29

## 2020-02-28 ASSESSMENT — PAIN SCALES - GENERAL: PAINLEVEL: NO PAIN (0)

## 2020-02-28 NOTE — NURSING NOTE
Due to patient being non-English speaking/uses sign language, an  was used for this visit. Only for face-to-face interpretation by an external agency, date and length of interpretation can be found on the scanned worksheet.     name: Sean Salinas  Agency: [employer]  Language: [place]ian   Telephone number: [phone]  Type of interpretation: Face-to-face, spoken    Eulalia Ocampo CMA

## 2020-02-28 NOTE — PATIENT INSTRUCTIONS
If things are not getting better come back in in 2 weeks and don't use oil for 3 days before coming in. For now will do the shampoo daily for 2 weeks. If things get better great ! In general I would use hijab only when out in public, and avoid pulling hair back as tight :)     Thank you for coming to [facility].    **If you had lab testing today and your results are reassuring or normal they will be be mailed to you or sent to your MyChart and you will be notified by email within 7 days.   **If the lab tests need quick action we will call you with the results.  The phone number we will call with results is # [phone] (home)    (home) . If this is not the best number please call our clinic and change the number.  **If any referrals were ordered today you should be getting a call in the next week.  If you don't hear about this within a week please call one of the following numbers   If your referral is for [facility] please call [phone]  If your referral is to outside [facility] please call the clinic number ([phone]) and ask for your team care coordinator.  If you need any refills please call your pharmacy and they will contact us.  If you have any concerns about today's visit or wish to schedule another appointment  please call our office during normal business hours  [phone] (8-5:00 M-F)  If you have urgent medical concerns please call [phone] at any time of the day.  If you have a medical emergency please call [phone]    Again thank you for choosing [facility] and please let us know how we can best partner with you to improve your and your family's health.

## 2020-02-28 NOTE — PROGRESS NOTES
HPI       Rupinder Spaulding is a 25 year old  who presents for   Chief Complaint   Patient presents with     Ingrown Toenail     Patient is complaing of ringworm on her head for x1week       About 10 days ago noticed what they think is ringworm in scalp. Itching mildly. Had dandruff before that is similar that went away on its own. No skin lesions no one in house w similar markings.       Problem, Medication and Allergy Lists were      Patient Active Problem List    Diagnosis Date Noted     Weight loss 08/03/2018     Priority: Medium     Gastroesophageal reflux disease, esophagitis presence not specified 10/10/2017     Priority: Medium     Vitamin D deficiency 07/21/2016     Priority: Medium     Autism spectrum disorder requiring very substantial support (level 3) 07/08/2016     Priority: Medium     Rarely speaks, is in a day program       Slow transit constipation 07/08/2016     Priority: Medium     Moderate malnutrition (H) 07/08/2016     Priority: Medium     Mental deficiency 06/03/2016     Priority: Medium     Mood disorder (H) 01/21/2016     Priority: Medium     Short stature 07/27/2015     Priority: Medium     Undernutrition 07/27/2015     Priority: Medium     Language barrier 01/07/2015     Priority: Medium     Developmental delay 10/02/2014     Priority: Medium   ,     Current Outpatient Medications   Medication Sig Dispense Refill     Calcium Carbonate Antacid 1177 MG CHEW Take 1 tablet (1,177 mg) by mouth 2 times daily as needed (Reflux) 84 tablet 3     Emollient (DAILY CONDITIONING TREATMENT) OINT apply to dry lips as needed       fluticasone (FLONASE) 50 MCG/ACT nasal spray Spray 1-2 sprays into both nostrils daily 16 g 3     ibuprofen (ADVIL/MOTRIN) 400 MG tablet Take 1 tablet (400 mg) by mouth every 6 hours as needed for moderate pain 100 tablet 1     loratadine (CLARITIN) 10 MG tablet Take 1 tablet (10 mg) by mouth daily 90 tablet 3     mirtazapine (REMERON) 45 MG tablet Take 1 tablet (45 mg) by mouth  "At Bedtime 31 tablet 11     multivitamin w/minerals (MULTI-VITAMIN) tablet Take 1 tablet by mouth daily 100 tablet 3     polyethylene glycol (MIRALAX) powder Take 17 g (1 capful) by mouth daily 510 g 11     QUEtiapine (SEROQUEL) 25 MG tablet Take 1 tablet (25 mg) by mouth At Bedtime 90 tablet 3     vitamin D3 (CHOLECALCIFEROL) 2000 units (50 mcg) tablet Take 1 tablet (2,000 Units) by mouth daily 90 tablet 3   ,   No Known Allergies.    Patient is an established patient of this clinic..         Review of Systems:   Review of Systems   Constitutional: Negative for fever and unexpected weight change.   Respiratory: Negative for cough, shortness of breath and wheezing.    Cardiovascular: Negative for chest pain.   Gastrointestinal: Negative for abdominal pain, blood in stool, constipation, diarrhea, nausea and vomiting.   Genitourinary: Negative for dysuria and hematuria.   Musculoskeletal: Negative for arthralgias and myalgias.   Neurological: Negative for weakness and headaches.            Physical Exam:     Vitals:    02/28/20 1015   BP: 96/69   Pulse: 86   Resp: 14   Temp: 98.8  F (37.1  C)   TempSrc: Oral   SpO2: 97%   Weight: 33.9 kg (74 lb 12.8 oz)   Height: 1.48 m (4' 10.27\")     Body mass index is 15.49 kg/m .  Vitals were reviewed and were normal     Physical Exam  Constitutional:       General: She is not in acute distress.     Appearance: She is well-developed.   HENT:      Head: Normocephalic and atraumatic.   Eyes:      Conjunctiva/sclera: Conjunctivae normal.   Cardiovascular:      Rate and Rhythm: Normal rate and regular rhythm.      Heart sounds: Normal heart sounds.   Pulmonary:      Effort: Pulmonary effort is normal. No respiratory distress.   Abdominal:      General: Bowel sounds are normal. There is no distension.      Palpations: Abdomen is soft.      Tenderness: There is no abdominal tenderness.   Musculoskeletal: Normal range of motion.   Skin:     General: Skin is warm.      Capillary Refill: " Capillary refill takes less than 2 seconds.      Findings: No rash.      Comments: Scalp of skin with thick scale in small patches. Mild patch thinner areas of hair in temples. No diffuse patch such as seen with usual tinea.    Neurological:      Mental Status: She is alert and oriented to person, place, and time.           Results:   Attempted to do KOH prep but patient had oil in air that interferred with sample     Assessment and Plan       Patient exam is more consistent with seborrheic dermatitis than tinea of scalp.  Likely mild alopecia is from wearing care hair tight and combination of oil and hjjab Educated patient on taking breaks from hijab when at home and not pulling hair back as tight  Use ketoconazole shampoo once a day for 2 weeks.  If symptoms are improved no reason to return of follow-up, if she does want to come back make a follow-up appointment and do not wear well and here for 3 days.  We can then test for tinea at that time.  1. Flaking of scalp    - ketoconazole (NIZORAL) 2 % external shampoo; Apply topically daily as needed for itching or irritation (once a day for 2 weeks)  Dispense: 100 mL; Refill: 0    2. Seborrheic dermatitis         There are no discontinued medications.    Options for treatment and follow-up care were reviewed with the patient. Rupinder Spaulding  engaged in the decision making process and verbalized understanding of the options discussed and agreed with the final plan.    Mahad Saxena MD

## 2020-03-03 ASSESSMENT — ENCOUNTER SYMPTOMS
HEADACHES: 0
UNEXPECTED WEIGHT CHANGE: 0
BLOOD IN STOOL: 0
ABDOMINAL PAIN: 0
ARTHRALGIAS: 0
COUGH: 0
WEAKNESS: 0
HEMATURIA: 0
DYSURIA: 0
DIARRHEA: 0
NAUSEA: 0
CONSTIPATION: 0
MYALGIAS: 0
WHEEZING: 0
FEVER: 0
SHORTNESS OF BREATH: 0
VOMITING: 0

## 2020-07-21 ENCOUNTER — DOCUMENTATION ONLY (OUTPATIENT)
Dept: FAMILY MEDICINE | Facility: CLINIC | Age: 25
End: 2020-07-21

## 2020-07-21 NOTE — PROGRESS NOTES
"When opening a documentation only encounter, be sure to enter in \"Chief Complaint\" Forms and in \" Comments\" Title of form, description if needed.    Rupinder is a 25 year old  female  Form received via: Fax  Form now resides in: Provider SUZAN Muñoz 5:27 PM July 21, 2020                    "

## 2020-07-23 NOTE — PROGRESS NOTES
Form has been completed by provider.     Form sent out via: Fax to [place] at Fax Number: [phone]  Patient informed: N/A  Output date: July 23, 2020    Elodia Hung CMA      **Please close the encounter**    *

## 2020-08-05 ENCOUNTER — OFFICE VISIT (OUTPATIENT)
Dept: FAMILY MEDICINE | Facility: CLINIC | Age: 25
End: 2020-08-05
Payer: MEDICAID

## 2020-08-05 VITALS
RESPIRATION RATE: 16 BRPM | HEIGHT: 58 IN | OXYGEN SATURATION: 97 % | WEIGHT: 76.6 LBS | DIASTOLIC BLOOD PRESSURE: 71 MMHG | SYSTOLIC BLOOD PRESSURE: 101 MMHG | HEART RATE: 87 BPM | TEMPERATURE: 98.8 F | BODY MASS INDEX: 16.08 KG/M2

## 2020-08-05 DIAGNOSIS — Z75.8 LANGUAGE BARRIER: ICD-10-CM

## 2020-08-05 DIAGNOSIS — E44.0 MODERATE MALNUTRITION (H): ICD-10-CM

## 2020-08-05 DIAGNOSIS — Z60.3 LANGUAGE BARRIER: ICD-10-CM

## 2020-08-05 DIAGNOSIS — R63.4 WEIGHT LOSS: ICD-10-CM

## 2020-08-05 DIAGNOSIS — F43.21 ADJUSTMENT DISORDER WITH DEPRESSED MOOD: ICD-10-CM

## 2020-08-05 DIAGNOSIS — F84.0 AUTISM SPECTRUM DISORDER REQUIRING VERY SUBSTANTIAL SUPPORT (LEVEL 3): ICD-10-CM

## 2020-08-05 DIAGNOSIS — R21 RASH: Primary | ICD-10-CM

## 2020-08-05 RX ORDER — MIRTAZAPINE 45 MG/1
45 TABLET, FILM COATED ORAL AT BEDTIME
Qty: 31 TABLET | Refills: 11 | Status: SHIPPED | OUTPATIENT
Start: 2020-08-05 | End: 2020-09-21

## 2020-08-05 SDOH — SOCIAL STABILITY - SOCIAL INSECURITY: ACCULTURATION DIFFICULTY: Z60.3

## 2020-08-05 ASSESSMENT — PATIENT HEALTH QUESTIONNAIRE - PHQ9: SUM OF ALL RESPONSES TO PHQ QUESTIONS 1-9: 14

## 2020-08-05 ASSESSMENT — MIFFLIN-ST. JEOR: SCORE: 991.46

## 2020-08-05 NOTE — TELEPHONE ENCOUNTER

## 2020-08-05 NOTE — PROGRESS NOTES
Preceptor Attestation:    Patient seen and evaluated in person. I discussed the patient with the resident. I have verified the content of the note, which accurately reflects my assessment of the patient and the plan of care.   Supervising Physician:  Ronald Sullivan MD.

## 2020-08-05 NOTE — PATIENT INSTRUCTIONS
Here is the plan from today's visit    Thank you for coming to [facility] today. I encourage you to make an appointment with your PCP for further discussion.      Scheduling:  If you have any concerns about today's visit or wish to schedule another appointment please call our office during normal business hours [phone] (8-5:00 M-F)    Medical Concerns:  If you have urgent medical concerns please call [phone] at any time of the day.    Cathy Hubbard MD

## 2020-08-05 NOTE — NURSING NOTE
Due to patient being non-English speaking/uses sign language, an  was used for this visit. Only for face-to-face interpretation by an external agency, date and length of interpretation can be found on the scanned worksheet.     name: Sean Salinsa  Agency: [employer]  Language: Mexican   Telephone number: [phone]  Type of interpretation: Telephone, spoken

## 2020-08-05 NOTE — PROGRESS NOTES
"Rupinder is a 25 year old who presents for   Patient presents with:  Derm Problem: Right arm- Under armpit,shaving cream irrates,mumps and redness.    Assessment and Plan      1. Rash  Patient reportedly developed what sounds like a contact dermatitis after using a hair removal cream to her right armpit.  Patient did not allow me to examine the area in the office today, however, states that has improved significantly.  Recommend avoiding this cream in the future.    2. Malnutrition  3. Weight loss  4. Language barrier  5. Autism spectrum disorder  Mom expressed significant concern regarding patient's weight.  Last visit in February 2020 she weighted 74 lbs, today 76 lbs. Looking back to May 2019 she weighed 82 lbs. BMI 15.86, slightly improved from 15.22 in January 2020.  Mom was encouraged by this, however, noted that she believes the patient has been eating more over the last few days knowing she would be weighed today.  I asked patient how she feels about her weight she tells me \"it's fine, I eat everything, there is no problem\".  Mom's report of food refusal and patient's BMI certainly raises concern for eating disorder, specifically anorexia nervosa.  Patient refusing to discuss this with me and denies any problem.  She is legally an adult and does appear to have capacity to make decisions. Differential also includes thyroid disorder, depression, malabsorption, malignancy, among others. Labs from January included normal BMP, mild leukopenia WBC 3.7 (3.9 prior), no other cell counts down. July 2019 labs showed normal B12 390, low vitamin D 19, she is on a vitamin D supplement.  I expressed my concern today and encouraged her to make an appointment to see her PCP to discuss this further when she is ready.       Return in about 4 weeks (around 9/2/2020) for In-Clinic Visit WITH PCP (MARIBETH).    There are no discontinued medications.      Cathy Hubbard MD         HPI       Rupinder is a 25 year old with " "history of seborrheic dermatitis, ASD, weight loss (BMI 15) who presents for   Patient presents with:  Derm Problem: Right arm- Under armpit,shaving cream irrates,mumps and redness.    Visit [place]  1. Rash/Lesion  Reports rash for about 2 weeks, better now. Just under right armpit. Patient does shave under the arm, cream to remove hair seemed to irritate it.     2. Weight  Mom concern about weight. Last visit in February 2020 she weighted 74 lbs, today 76 lbs. Looking back to May 2019 she weighed 82 lbs. BMI 15.86, slightly improved from 15.22 in January 2020.      Weight loss has been attributed to poor appetite with menses and oral aversion with ASD. Continues to have cramping with menses (x1 year) but is able to eat some food during this time. Patient tells me she has no problem with eating, eats Icelandic pancakes, etc, \"everything mom makes\". Mom tells me she refuses all food during menses, tries small amounts of foods when she is not having menses. Mom notes she believes patient ate more over the past few days because she knew she would be weighed at the appointment today.    PHQ2 answered more than half days she has little interest in doing things and nearly every day feeling down, depressed or hopeless. When asked about this, /patient tell me mom was answering these questions. When I asked patient about this, she tells me she does not want to talk to me or answer these questions and does not ever want to come back to clinic. I asked if she would prefer to see and speak with her PCP, Dr. Mixon, she again told me she never wants to come back to clinic. I expressed my concern about her weight and nutrition status and recommend further discussion and potentially work up to try to sort out the cause. Patient refused to speak to me further and requested to leave.    A Icelandic  was used for  this visit.   Problem, Medication and Allergy Lists were reviewed and updated if needed.  Patient is an " "established patient of this clinic.  Reviewed and updated as needed this visit by clinical staff  Tobacco  Allergies  Meds  Med Hx  Surg Hx  Fam Hx  Soc Hx      Reviewed and updated as needed this visit by Provider       Health Maintenance Due   Topic Date Due     PREVENTIVE CARE VISIT  1996          Review of Systems:   7 point review of symptoms was otherwise negative except as noted in HPI         Physical Exam:     Vitals:    08/05/20 0901   BP: 101/71   Pulse: 87   Resp: 16   Temp: 98.8  F (37.1  C)   TempSrc: Oral   SpO2: 97%   Weight: 34.7 kg (76 lb 9.6 oz)   Height: 1.48 m (4' 10.27\")     Body mass index is 15.86 kg/m .  Vitals were reviewed and were normal     Physical Exam  Constitutional:       General: She is not in acute distress.     Appearance: She is well-developed. She is not diaphoretic.   Cardiovascular:      Rate and Rhythm: Normal rate.   Pulmonary:      Effort: Pulmonary effort is normal. No respiratory distress.   Neurological:      General: No focal deficit present.      Mental Status: She is alert.   Psychiatric:         Mood and Affect: Affect is angry.         Behavior: Behavior is uncooperative and agitated.         Judgment: Judgment is impulsive.         Results:   No testing ordered today    Options for treatment and follow-up care were reviewed with the patient. Rupinder Spaulding engaged in the decision making process and verbalized understanding of the options discussed and agreed with the final plan.  MD [facility]          "

## 2020-09-21 ENCOUNTER — OFFICE VISIT (OUTPATIENT)
Dept: FAMILY MEDICINE | Facility: CLINIC | Age: 25
End: 2020-09-21
Payer: MEDICAID

## 2020-09-21 VITALS
RESPIRATION RATE: 16 BRPM | HEIGHT: 58 IN | WEIGHT: 74.2 LBS | SYSTOLIC BLOOD PRESSURE: 91 MMHG | BODY MASS INDEX: 15.58 KG/M2 | DIASTOLIC BLOOD PRESSURE: 65 MMHG | TEMPERATURE: 99.3 F | OXYGEN SATURATION: 98 % | HEART RATE: 86 BPM

## 2020-09-21 DIAGNOSIS — N94.6 DYSMENORRHEA: ICD-10-CM

## 2020-09-21 DIAGNOSIS — F84.0 AUTISM SPECTRUM DISORDER REQUIRING VERY SUBSTANTIAL SUPPORT (LEVEL 3): Primary | ICD-10-CM

## 2020-09-21 DIAGNOSIS — F39 MOOD DISORDER (H): ICD-10-CM

## 2020-09-21 DIAGNOSIS — E44.0 MODERATE MALNUTRITION (H): ICD-10-CM

## 2020-09-21 RX ORDER — QUETIAPINE FUMARATE 25 MG/1
25 TABLET, FILM COATED ORAL 2 TIMES DAILY
Qty: 60 TABLET | Refills: 1 | Status: SHIPPED | OUTPATIENT
Start: 2020-09-21 | End: 2021-04-26

## 2020-09-21 RX ORDER — MIRTAZAPINE 30 MG/1
30 TABLET, FILM COATED ORAL AT BEDTIME
Qty: 60 TABLET | Refills: 1 | Status: SHIPPED | OUTPATIENT
Start: 2020-09-21 | End: 2020-12-04

## 2020-09-21 RX ORDER — IBUPROFEN 400 MG/1
400 TABLET, FILM COATED ORAL EVERY 6 HOURS PRN
Qty: 100 TABLET | Refills: 1 | Status: SHIPPED | OUTPATIENT
Start: 2020-09-21 | End: 2021-04-26

## 2020-09-21 ASSESSMENT — MIFFLIN-ST. JEOR: SCORE: 980.57

## 2020-09-21 NOTE — NURSING NOTE
Due to patient being non-English speaking/uses sign language, an  was used for this visit. Only for face-to-face interpretation by an external agency, date and length of interpretation can be found on the scanned worksheet.     name: Graciela Rolle  Language: Sri Lankan  Agency: [employer]  Phone number: [phone]  Type of interpretation: Telephone, spoken
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
## 2020-09-21 NOTE — PATIENT INSTRUCTIONS
Here is the plan from today's visit    1. Mood disorder (H)  Start this medicaiton  - QUEtiapine (SEROQUEL) 25 MG tablet; Take 1 tablet (25 mg) by mouth 2 times daily  Dispense: 60 tablet; Refill: 1    2. Autism spectrum disorder requiring very substantial support (level 3)       3. Adjustment disorder with depressed mood  Decrease to 30 mg daily  - mirtazapine (REMERON) 30 MG tablet; Take 1 tablet (30 mg) by mouth At Bedtime  Dispense: 60 tablet; Refill: 1    4. Dysmenorrhea  For pain  - ibuprofen (ADVIL/MOTRIN) 400 MG tablet; Take 1 tablet (400 mg) by mouth every 6 hours as needed for moderate pain  Dispense: 100 tablet; Refill: 1      Please call or return to clinic if your symptoms don't go away.    Follow up plan  Return in about 2 weeks (around 10/5/2020) for recheck weight.     Thank you for coming to [facility] today.  Lab Testing:  **If you had lab testing today and your results are reassuring or normal they will be mailed to you or sent through [facility] within 7 days.   **If the lab tests need quick action we will call you with the results.  The phone number we will call with results is # [phone] (home) . If this is not the best number please call our clinic and change the number.  Medication Refills:  If you need any refills please call your pharmacy and they will contact us.   If you need to  your refill at a new pharmacy, please contact the new pharmacy directly. The new pharmacy will help you get your medications transferred faster.   Scheduling:  If you have any concerns about today's visit or wish to schedule another appointment please call our office during normal business hours [phone] (8-5:00 M-F)   eferrals to [facility] please call [phone].   Mammogram Scheduling [phone]     XRay/CT/Ultrasound/MRI Scheduling [phone]    Medical Concerns:  If you have urgent medical concerns please call [phone] at any time of the day.    Reuben  MD [place]

## 2020-09-28 ENCOUNTER — DOCUMENTATION ONLY (OUTPATIENT)
Dept: PSYCHOLOGY | Facility: CLINIC | Age: 25
End: 2020-09-28

## 2020-09-28 NOTE — PROGRESS NOTES
Behavioral Health Consult:    Rupinder Walter was discussed at Dr. Mixon's Interprofessional Team Meeting on 9/28/2020.  I was asked by Dr. Mixon to review her chart for some potential recommendations to assist with her care.    Rupinder is a  25 year old Georgian female who first began receiving services from our clinic in 2016. Prior to that time she received care within the [facility] and [facility] systems.  Per chart review, Rupinder's mother reports she started to regress significantly in behavior and functioning when she was about 11 years old.  Brief review or prior psychiatric assessments from 2015 and 2016 suggest probable mild intellectual disability, autism spectrum disorder, depression, and some ongoing challenges with eating that bring up concerns for anorexia.  Current medications are quetiapine and mirtazapine.  Rupinder was seen by Dr. Bartlett in September 2019 for a consultation. There was supposed to be follow up with Dr. DOTY after that visit, as well as referral for neuropsychological evaluation, but it sounds like mom did not understand the purpose for the f/u visit or the neuropsychological eval, so this did not occur.  There were attempts for our [place]ian CHW to talk further with mom, but to no avail.     Dr. Mixon is concerned about decreasing weight and the possibility for anorexia.  Notes indicate that low weight has been ongoing concern for the past 5 years.  Some notes state patient will not eat during menses.  Some indicate there is some sensory aversion to some foods.  Review of weights show that her weight was at it's hightest of 95 lbs in 2018.  If it is graphed out the trend has been downward since that time.  Currently she is in the low 70's and her BMI places her at around 15.      Dr. Bartlett's conversation with [facility]  from 2019 indicates that patient has a waiver and is receiving   2-3 hours personal support/day  10 hours/week of respite  4 hours/day of  PCA.    Recommendations:  1.  Reach out to [place] (if she is still county ) at [phone] to find out what services are currently in place, as well as to share concerns about eating restriction.    2. Can consult with inpatient eating disorder program at [facility] if there needs to be a hospitalization.  If patients weight is low and/or she is also having hypotension, hypothermia, orthostasis, abnormal ekg, electrolyte imbalance or other acute medical problems, inpatient hospitalization should be considered.    3.  [facility] also has a program to address Avoidant/Restrictive Food Intake Disorder (ARFID) which can often co-occur with autism.  This could be considered as well.      4.  Can try to refer for neuropsychological evaluation again, but I am unsure if this is the area that we want to really push with some of the other concerns that are going on and mom's reluctance to engage in this type of evaluation    5. Possible to rerefer to Dr. Bartlett for updated recommendations.  Dr. Bartlett did say in her note that if the patient continues on quetiapine that the patient needs periodic SGA labs.

## 2020-10-15 DIAGNOSIS — K59.01 SLOW TRANSIT CONSTIPATION: ICD-10-CM

## 2020-10-15 RX ORDER — POLYETHYLENE GLYCOL 3350 17 G/17G
1 POWDER, FOR SOLUTION ORAL DAILY
Qty: 510 G | Refills: 11 | Status: SHIPPED | OUTPATIENT
Start: 2020-10-15 | End: 2021-04-26

## 2020-12-04 ENCOUNTER — OFFICE VISIT (OUTPATIENT)
Dept: FAMILY MEDICINE | Facility: CLINIC | Age: 25
End: 2020-12-04
Payer: MEDICAID

## 2020-12-04 VITALS
TEMPERATURE: 98.2 F | DIASTOLIC BLOOD PRESSURE: 74 MMHG | WEIGHT: 75.8 LBS | HEART RATE: 80 BPM | BODY MASS INDEX: 15.7 KG/M2 | OXYGEN SATURATION: 100 % | SYSTOLIC BLOOD PRESSURE: 110 MMHG | RESPIRATION RATE: 16 BRPM

## 2020-12-04 DIAGNOSIS — F39 MOOD DISORDER (H): ICD-10-CM

## 2020-12-04 DIAGNOSIS — F43.21 ADJUSTMENT DISORDER WITH DEPRESSED MOOD: ICD-10-CM

## 2020-12-04 DIAGNOSIS — K59.01 SLOW TRANSIT CONSTIPATION: ICD-10-CM

## 2020-12-04 DIAGNOSIS — E63.9 UNDERNUTRITION: Primary | ICD-10-CM

## 2020-12-04 LAB
% GRANULOCYTES: 32.4 %G (ref 40–75)
ALBUMIN SERPL-MCNC: 4.3 MG/DL (ref 3.8–5)
ALP SERPL-CCNC: 50.2 U/L (ref 31.7–110.5)
ALT SERPL-CCNC: 6.5 U/L (ref 0–45)
AST SERPL-CCNC: 8.7 U/L (ref 0–45)
BILIRUB SERPL-MCNC: <0.4 MG/DL (ref 0.2–1.3)
BUN SERPL-MCNC: 9.2 MG/DL (ref 7–19)
CALCIUM SERPL-MCNC: 9.6 MG/DL (ref 8.5–10.1)
CHLORIDE SERPLBLD-SCNC: 102.1 MMOL/L (ref 98–110)
CO2 SERPL-SCNC: 23 MMOL/L (ref 20–32)
CREAT SERPL-MCNC: 0.5 MG/DL (ref 0.5–1)
GFR SERPL CREATININE-BSD FRML MDRD: >90 ML/MIN/1.7 M2
GLUCOSE SERPL-MCNC: 96.7 MG'DL (ref 70–99)
GRANULOCYTES #: 1.1 K/UL (ref 1.6–8.3)
HCT VFR BLD AUTO: 35.4 % (ref 35–47)
HEMOGLOBIN: 11.1 G/DL (ref 11.7–15.7)
LYMPHOCYTES # BLD AUTO: 2 K/UL (ref 0.8–5.3)
LYMPHOCYTES NFR BLD AUTO: 58.1 %L (ref 20–48)
MCH RBC QN AUTO: 26.4 PG (ref 26.5–35)
MCHC RBC AUTO-ENTMCNC: 31.4 G/DL (ref 32–36)
MCV RBC AUTO: 84.4 FL (ref 78–100)
MID #: 0.3 K/UL (ref 0–2.2)
MID %: 9.5 %M (ref 0–20)
PLATELET # BLD AUTO: 164 K/UL (ref 150–450)
POTASSIUM SERPL-SCNC: 3.4 MMOL/L (ref 3.3–4.5)
PROT SERPL-MCNC: 7 G/DL (ref 6.8–8.8)
RBC # BLD AUTO: 4.2 M/UL (ref 3.8–5.2)
SODIUM SERPL-SCNC: 138.4 MMOL/L (ref 132.6–141.4)
TSH SERPL DL<=0.005 MIU/L-ACNC: 1.7 MU/L (ref 0.4–4)
WBC # BLD AUTO: 3.5 K/UL (ref 4–11)

## 2020-12-04 PROCEDURE — 84134 ASSAY OF PREALBUMIN: CPT | Performed by: FAMILY MEDICINE

## 2020-12-04 PROCEDURE — 99214 OFFICE O/P EST MOD 30 MIN: CPT | Performed by: FAMILY MEDICINE

## 2020-12-04 PROCEDURE — 80053 COMPREHEN METABOLIC PANEL: CPT | Performed by: FAMILY MEDICINE

## 2020-12-04 PROCEDURE — 36415 COLL VENOUS BLD VENIPUNCTURE: CPT | Performed by: FAMILY MEDICINE

## 2020-12-04 PROCEDURE — 85025 COMPLETE CBC W/AUTO DIFF WBC: CPT | Performed by: FAMILY MEDICINE

## 2020-12-04 PROCEDURE — 84443 ASSAY THYROID STIM HORMONE: CPT | Performed by: FAMILY MEDICINE

## 2020-12-04 RX ORDER — QUETIAPINE FUMARATE 25 MG/1
TABLET, FILM COATED ORAL
Qty: 270 TABLET | Refills: 1 | Status: SHIPPED | OUTPATIENT
Start: 2020-12-04 | End: 2021-04-26

## 2020-12-04 RX ORDER — MIRTAZAPINE 30 MG/1
30 TABLET, FILM COATED ORAL AT BEDTIME
Qty: 90 TABLET | Refills: 1 | Status: SHIPPED | OUTPATIENT
Start: 2020-12-04 | End: 2021-04-26

## 2020-12-04 RX ORDER — POLYETHYLENE GLYCOL 3350 17 G/17G
1 POWDER, FOR SOLUTION ORAL DAILY
Qty: 507 G | Refills: 3 | Status: SHIPPED | OUTPATIENT
Start: 2020-12-04 | End: 2021-08-19

## 2020-12-04 NOTE — NURSING NOTE
Due to patient being non-English speaking/uses sign language, an  was used for this visit. Only for face-to-face interpretation by an external agency, date and length of interpretation can be found on the scanned worksheet.     name: Graciela Rolle  Language: [place] citizen  Agency: [employer]  Phone number: [phone]  Type of interpretation: Telephone, spoken

## 2020-12-04 NOTE — PATIENT INSTRUCTIONS
Here is the plan from today's visit    1. Undernutrition  Continue current plan  - Comprehensive Metabolic Panel ([facility])  - Prealbumin  - CBC with Diff Plt ([facility])  - TSH with free T4 reflex    2. Mood disorder (H)    - mirtazapine (REMERON) 30 MG tablet; Take 1 tablet (30 mg) by mouth At Bedtime  Dispense: 90 tablet; Refill: 1    3. Adjustment disorder with depressed mood     - QUEtiapine (SEROQUEL) 25 MG tablet; Take 1 tablet (25 mg) by mouth daily AND 2 tablets (50 mg) At Bedtime.  Dispense: 270 tablet; Refill: 1    4. Slow transit constipation     - polyethylene glycol (MIRALAX) 17 GM/Dose powder; Take 17 g (1 capful) by mouth daily  Dispense: 507 g; Refill: 3      Please call or return to clinic if your symptoms don't go away.    Follow up plan  No follow-ups on file.     Thank you for coming to [facility] today.  Lab Testing:  **If you had lab testing today and your results are reassuring or normal they will be mailed to you or sent through [facility] within 7 days.   **If the lab tests need quick action we will call you with the results.  The phone number we will call with results is # [phone] (home) . If this is not the best number please call our clinic and change the number.  Medication Refills:  If you need any refills please call your pharmacy and they will contact us.   If you need to  your refill at a new pharmacy, please contact the new pharmacy directly. The new pharmacy will help you get your medications transferred faster.   Scheduling:  If you have any concerns about today's visit or wish to schedule another appointment please call our office during normal business hours [phone] (8-5:00 M-F)   eferrals to [facility] please call [phone].   Mammogram Scheduling [phone]     XRay/CT/Ultrasound/MRI Scheduling [phone]    Medical Concerns:  If you have urgent medical concerns please call [phone] at any time of the day.    Reuben Mixon  MD

## 2020-12-04 NOTE — PROGRESS NOTES
Rupinder is a 25 year old  who presents for   Patient presents with:  Weight Check: Weight loss concerns      Assessment and Plan      1. Undernutrition  Weight is stable is actually gained a pound we will get evaluate her nutrition with the following labs.  - Comprehensive Metabolic Panel ([facility])  - Prealbumin  - CBC with Diff Plt ([facility])  - TSH with free T4 reflex    2. Mood disorder (H)  Patient's mood seems to have been improved pleased with how her daughter is interacting with her we will continue to use mirtazapine  - mirtazapine (REMERON) 30 MG tablet; Take 1 tablet (30 mg) by mouth At Bedtime  Dispense: 90 tablet; Refill: 1    3. Adjustment disorder with depressed mood  See if this has a beneficial effect on her mood and sleep seems to be stable at this point.  - QUEtiapine (SEROQUEL) 25 MG tablet; Take 1 tablet (25 mg) by mouth daily AND 2 tablets (50 mg) At Bedtime.  Dispense: 270 tablet; Refill: 1    4. Slow transit constipation  We will attempt to use this for constipation.  - polyethylene glycol (MIRALAX) 17 GM/Dose powder; Take 17 g (1 capful) by mouth daily  Dispense: 507 g; Refill: 3     Return in about 4 weeks (around 1/1/2021) for weight check.    Medications Discontinued During This Encounter   Medication Reason     QUEtiapine (SEROQUEL) 25 MG tablet      mirtazapine (REMERON) 30 MG tablet          Reuben Mixon MD         HPI       Rupinder is a 25 year old  who presents for   Patient presents with:  Weight Check: Weight loss concerns        Visit [place]  1. Weight loss.   Willing to try more things, drinking ensure.  Mother and daughter are less confrontive daughter is less angry and she is willing to continue eating regularly.  2. Insomnia/mood disorder  Medication is working for insomnia including the quetiapine and the mirtazapine and her mood appears to be stable she is less irritable more interactive with family more willing to try more food    Problem, Medication and Allergy Lists were  reviewed and updated if needed..  Patient is an established patient of this clinic.         Review of Systems:   Review of Systems  7 point review of symptoms was otherwise negative except as noted in HPI         Physical Exam:     Vitals:    12/04/20 1410   BP: 110/74   Pulse: 80   Resp: 16   Temp: 98.2  F (36.8  C)   TempSrc: Oral   SpO2: 100%   Weight: 34.4 kg (75 lb 12.8 oz)     Body mass index is 15.7 kg/m .  Vitals were reviewed and were normal     Physical Exam  Vitals signs and nursing note reviewed.   Constitutional:       General: She is not in acute distress.     Appearance: She is well-developed. She is not diaphoretic.   Cardiovascular:      Rate and Rhythm: Normal rate.   Skin:     General: Skin is warm and dry.   Neurological:      Mental Status: She is alert and oriented to person, place, and time.   Psychiatric:         Behavior: Behavior normal.         Thought Content: Thought content normal.           Results:      Results from this visit  Results for orders placed or performed in visit on 12/04/20   Comprehensive Metabolic Panel ([facility])     Status: None   Result Value Ref Range    Calcium 9.6 8.5 - 10.1 mg/dL    Chloride 102.1 98.0 - 110.0 mmol/L    Carbon Dioxide 23.0 20.0 - 32.0 mmol/L    Creatinine 0.5 0.5 - 1.0 mg/dL    Glucose 96.7 70.0 - 99.0 mg'dL    Potassium 3.4 3.3 - 4.5 mmol/L    Sodium 138.4 132.6 - 141.4 mmol/L    Protein Total 7.0 6.8 - 8.8 g/dL    GFR Estimate >90 >60.0 mL/min/1.7 m2    GFR Estimate If Black >90 >60.0 mL/min/1.7 m2    Albumin 4.3 3.8 - 5.0 mg/dL    Alkaline Phosphatase 50.2 31.7 - 110.5 U/L    ALT 6.5 0.0 - 45.0 U/L    AST 8.7 0.0 - 45.0 U/L    Bilirubin Total <0.4 0.2 - 1.3 mg/dL    Urea Nitrogen 9.2 7.0 - 19.0 mg/dL   CBC with Diff Plt ([facility])     Status: Abnormal   Result Value Ref Range    WBC 3.5 (L) 4.0 - 11.0 K/uL    Lymphocytes # 2.0 0.8 - 5.3 K/uL    % Lymphocytes 58.1 (H) 20.0 - 48.0 %L    Mid # 0.3 0.0 - 2.2 K/uL    Mid % 9.5 0.0 - 20.0 %M     GRANULOCYTES # 1.1 (L) 1.6 - 8.3 K/uL    % Granulocytes 32.4 (L) 40.0 - 75.0 %G    RBC 4.20 3.80 - 5.20 M/uL    Hemoglobin 11.1 (L) 11.7 - 15.7 g/dL    Hematocrit 35.4 35.0 - 47.0 %    MCV 84.4 78.0 - 100.0 fL    MCH 26.4 (L) 26.5 - 35.0 pg    MCHC 31.4 (L) 32.0 - 36.0 g/dL    Platelets 164.0 150.0 - 450.0 K/uL       Options for treatment and follow-up care were reviewed with the patient. Rupinder Spaulding  engaged in the decision making process and verbalized understanding of the options discussed and agreed with the final plan.  Reuben Mixon MD  [facility]

## 2020-12-04 NOTE — LETTER
December 8, 2020    [id number]    Rupinder Spaulding  [address]  [facility] [address]    Dear Rupinder Spaulding.      Please see below for your results from your recent testing.  Your results are reassuring.If you have questions please contact the clinic to make an appointment with  me or your primary doctor to discuss the results. Estonian translation:jitendraaatosha ramirez kuu gu xaqiijinay jawaabtaadii hadaad suaal qabto fadlan tyrese wac dhaqtarka kuu qaaska ah oo ka qabso elana si uu kuugu sharxo jawaabtaada.    Resulted Orders   Comprehensive Metabolic Panel ([facility])   Result Value Ref Range    Calcium 9.6 8.5 - 10.1 mg/dL    Chloride 102.1 98.0 - 110.0 mmol/L    Carbon Dioxide 23.0 20.0 - 32.0 mmol/L    Creatinine 0.5 0.5 - 1.0 mg/dL    Glucose 96.7 70.0 - 99.0 mg'dL    Potassium 3.4 3.3 - 4.5 mmol/L    Sodium 138.4 132.6 - 141.4 mmol/L    Protein Total 7.0 6.8 - 8.8 g/dL    GFR Estimate >90 >60.0 mL/min/1.7 m2    GFR Estimate If Black >90 >60.0 mL/min/1.7 m2    Albumin 4.3 3.8 - 5.0 mg/dL    Alkaline Phosphatase 50.2 31.7 - 110.5 U/L    ALT 6.5 0.0 - 45.0 U/L    AST 8.7 0.0 - 45.0 U/L    Bilirubin Total <0.4 0.2 - 1.3 mg/dL    Urea Nitrogen 9.2 7.0 - 19.0 mg/dL   Prealbumin   Result Value Ref Range    Prealbumin 22 15 - 45 mg/dL   CBC with Diff Plt ([facility])   Result Value Ref Range    WBC 3.5 (L) 4.0 - 11.0 K/uL    Lymphocytes # 2.0 0.8 - 5.3 K/uL    % Lymphocytes 58.1 (H) 20.0 - 48.0 %L    Mid # 0.3 0.0 - 2.2 K/uL    Mid % 9.5 0.0 - 20.0 %M    GRANULOCYTES # 1.1 (L) 1.6 - 8.3 K/uL    % Granulocytes 32.4 (L) 40.0 - 75.0 %G    RBC 4.20 3.80 - 5.20 M/uL    Hemoglobin 11.1 (L) 11.7 - 15.7 g/dL    Hematocrit 35.4 35.0 - 47.0 %    MCV 84.4 78.0 - 100.0 fL    MCH 26.4 (L) 26.5 - 35.0 pg    MCHC 31.4 (L) 32.0 - 36.0 g/dL    Platelets 164.0 150.0 - 450.0 K/uL   TSH with free T4 reflex   Result Value Ref Range    TSH 1.70 0.40 - 4.00 mU/L           Sincerely   Reuben Mixon MD  .

## 2020-12-07 LAB — PREALB SERPL IA-MCNC: 22 MG/DL (ref 15–45)

## 2021-02-01 NOTE — PROGRESS NOTES
Assessment & Plan       Slow transit constipation  Patient with chronic constipation, improved with use of Miralax. Encouraged continued daily use. Refill sent.    Moderate malnutrition (H)  Mood disorder (H)  Autism spectrum disorder requiring very substantial support (level 3)  Weight loss  26 year old female with malnutrition and weight loss, now with 5 lb weight gain since last visit 2 months ago. Her weight of 80 lbs today is the highest weight we have for her since 5/2019. Her mood has also drastically improved since initiation of quetiapine and mirtazapine. Last time I saw patient in 8/2020, she refused to speak with me, repeatedly told me she would never return to this clinic. Today, she was quite engaged, thanked me for my help and care several times. I am encouraged by her progress, and agree with continued close monitoring of her weight. She has an appointment scheduled with her PCP, Dr. Mixon, in 3 weeks, which I encouraged her to keep.      From Dr. Porter's prior note:   Can consult with inpatient eating disorder program at [facility] if there needs to be a hospitalization.  If patients weight is low and/or she is also having hypotension, hypothermia, orthostasis, abnormal ekg, electrolyte imbalance or other acute medical problems, inpatient hospitalization should be considered. [facility] also has a program to address Avoidant/Restrictive Food Intake Disorder (ARFID) which can often co-occur with autism.   - No indications for this at this time      Vitamin D deficiency  Refill:   - vitamin D3 (CHOLECALCIFEROL) 50 mcg (2000 units) tablet; Take 1 tablet (50 mcg) by mouth daily    Review of external notes as documented above     Return in about 6 weeks (around 3/16/2021) for Follow up, in person with Dr. Mixon or myself (Stevie) . - Appt with Dr. Mixon 3/02/2021    Cathy Hubbard MD  [facility] DIALLO Bucio is a 26 year old who presents to clinic today for  the following health issues  accompanied by her mother:    HPI     Weight loss  Mood disorder  Insomnia  ASD    I last saw patient 8/05/2020 at which time her mother brought her in due to concerns about her weight. Weight was down to 76 lbs at that visit, May 2019 she weighed 82 lbs. BMI 15.86. Weight loss had been attributed to poor appetite with menses and oral aversion with ASD. Patient expressed frustration and denied any weight/eating concerns. Patient repeatedly stated she did not want to talk to me or every come back to this clinic again.     Followed up with PCP, Dr. Mixon, 9/2020 for ASD and weight follow up. At that point, mom reported patient yells and screams, occasionally becoming physical, at her when she offers her food. Started mirtazapine 30 mg at bedtime and quetiapine 25 mg BID. Follow up with Dr. Mixon 12/04/20 patient's mood notably improved and had gained one pound. Were able to obtain labs at that visit which showed normal electrolytes, stable leukopenia (3.5), mild microcytic anemia (11.1), normal albumin and prealbumin, normal TSH.     Per documentation from Dr. 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 Rupinder was seen by Dr. Bartlett in September 2019 for a consultation. There was supposed to be follow up with Dr. DOTY after that visit, as well as referral for neuropsychological evaluation, but it sounds like mom did not understand the purpose for the f/u visit or the neuropsychological eval, so this did not occur.  There were attempts for our [facility] to talk further with mom, but to no avail.  Notes indicate that low weight has been ongoing concern for the past 5 years.  Some notes state patient will not eat during menses.  Some indicate there is some sensory aversion to some foods.  Review of weights  "show that her weight was at it's hightest of 95 lbs in 2018.     Today, patient states she is doing well.    Sleep: \"too much\", 11pm-6a, occasionally naps as she feels tired during the day. Does feel like the Seroquel has helped with her sleep.     Mood: \"very good, much better thank you\". Mom agrees with this.     Constipation: has a bowel movement two times per day. Miralax has been helpful.     Review of Systems   10 point ROS neg other than the symptoms noted above in the HPI.      Objective    BP 92/64   Pulse 86   Temp 98.5  F (36.9  C) (Oral)   Resp 16   Ht 1.52 m (4' 11.84\")   Wt 36.4 kg (80 lb 3.2 oz)   SpO2 100%   BMI 15.75 kg/m    Body mass index is 15.75 kg/m .   Wt Readings from Last 4 Encounters:   02/02/21 36.4 kg (80 lb 3.2 oz)   12/04/20 34.4 kg (75 lb 12.8 oz)   09/21/20 33.7 kg (74 lb 3.2 oz)   08/05/20 34.7 kg (76 lb 9.6 oz)       Physical Exam  Constitutional:       General: She is not in acute distress.     Appearance: She is well-developed. She is not diaphoretic.   Cardiovascular:      Rate and Rhythm: Normal rate.   Pulmonary:      Effort: Pulmonary effort is normal. No respiratory distress.   Neurological:      General: No focal deficit present.      Mental Status: She is alert.   Psychiatric:         Attention and Perception: Attention normal.         Mood and Affect: Mood normal.         Speech: Speech normal.         Behavior: Behavior normal. Behavior is not agitated or combative. Behavior is cooperative.         Thought Content: Thought content does not include suicidal ideation.            Component      Latest Ref Rng & Units 12/4/2020   Calcium      8.5 - 10.1 mg/dL 9.6   Chloride      98.0 - 110.0 mmol/L 102.1   Carbon Dioxide      20.0 - 32.0 mmol/L 23.0   Creatinine      0.5 - 1.0 mg/dL 0.5   Glucose      70.0 - 99.0 mg'dL 96.7   Potassium      3.3 - 4.5 mmol/L 3.4   Sodium      132.6 - 141.4 mmol/L 138.4   Protein Total      6.8 - 8.8 g/dL 7.0   GFR Estimate      >60.0 " mL/min/1.7 m2 >90   GFR Estimate If Black      >60.0 mL/min/1.7 m2 >90   Albumin      3.8 - 5.0 mg/dL 4.3   Alkaline Phosphatase      31.7 - 110.5 U/L 50.2   ALT      0.0 - 45.0 U/L 6.5   AST      0.0 - 45.0 U/L 8.7   Bilirubin Total      0.2 - 1.3 mg/dL <0.4   Urea Nitrogen      7.0 - 19.0 mg/dL 9.2   WBC      4.0 - 11.0 K/uL 3.5 (L)   Lymphocytes #      0.8 - 5.3 K/uL 2.0   % Lymphocytes      20.0 - 48.0 %L 58.1 (H)   Mid #      0.0 - 2.2 K/uL 0.3   Mid %      0.0 - 20.0 %M 9.5   GRANULOCYTES #      1.6 - 8.3 K/uL 1.1 (L)   % Granulocytes      40.0 - 75.0 %G 32.4 (L)   RBC      3.80 - 5.20 M/uL 4.20   Hemoglobin      11.7 - 15.7 g/dL 11.1 (L)   Hematocrit      35.0 - 47.0 % 35.4   MCV      78.0 - 100.0 fL 84.4   MCH      26.5 - 35.0 pg 26.4 (L)   MCHC      32.0 - 36.0 g/dL 31.4 (L)   Platelets      150.0 - 450.0 K/uL 164.0   Prealbumin      15 - 45 mg/dL 22   TSH      0.40 - 4.00 mU/L 1.70

## 2021-02-02 ENCOUNTER — OFFICE VISIT (OUTPATIENT)
Dept: FAMILY MEDICINE | Facility: CLINIC | Age: 26
End: 2021-02-02
Payer: MEDICAID

## 2021-02-02 VITALS
TEMPERATURE: 98.5 F | BODY MASS INDEX: 15.75 KG/M2 | OXYGEN SATURATION: 100 % | HEART RATE: 86 BPM | WEIGHT: 80.2 LBS | RESPIRATION RATE: 16 BRPM | SYSTOLIC BLOOD PRESSURE: 92 MMHG | HEIGHT: 60 IN | DIASTOLIC BLOOD PRESSURE: 64 MMHG

## 2021-02-02 DIAGNOSIS — F39 MOOD DISORDER (H): ICD-10-CM

## 2021-02-02 DIAGNOSIS — E55.9 VITAMIN D DEFICIENCY: ICD-10-CM

## 2021-02-02 DIAGNOSIS — K59.01 SLOW TRANSIT CONSTIPATION: ICD-10-CM

## 2021-02-02 DIAGNOSIS — F84.0 AUTISM SPECTRUM DISORDER REQUIRING VERY SUBSTANTIAL SUPPORT (LEVEL 3): ICD-10-CM

## 2021-02-02 DIAGNOSIS — R63.4 WEIGHT LOSS: Primary | ICD-10-CM

## 2021-02-02 DIAGNOSIS — E44.0 MODERATE MALNUTRITION (H): ICD-10-CM

## 2021-02-02 PROCEDURE — 99214 OFFICE O/P EST MOD 30 MIN: CPT | Mod: GC | Performed by: STUDENT IN AN ORGANIZED HEALTH CARE EDUCATION/TRAINING PROGRAM

## 2021-02-02 RX ORDER — CHOLECALCIFEROL (VITAMIN D3) 50 MCG
50 TABLET ORAL DAILY
Qty: 90 TABLET | Refills: 3 | Status: SHIPPED | OUTPATIENT
Start: 2021-02-02 | End: 2021-08-23

## 2021-02-02 ASSESSMENT — MIFFLIN-ST. JEOR: SCORE: 1027.79

## 2021-02-02 NOTE — NURSING NOTE
Due to patient being non-English speaking/uses sign language, an  was used for this visit. Only for face-to-face interpretation by an external agency, date and length of interpretation can be found on the scanned worksheet.     name: Jacquelin  Agency: [employer] - telephone  Language: Albanian   Telephone number: [phone]  Type of interpretation: Telephone, spoken

## 2021-02-02 NOTE — PROGRESS NOTES
Preceptor Attestation:   Patient seen, evaluated and discussed with the resident. I have verified the content of the note, which accurately reflects my assessment of the patient and the plan of care.   Supervising Physician:  Judi Stark MD

## 2021-02-02 NOTE — PATIENT INSTRUCTIONS
Patient Education   Here is the plan from today's visit    1. Vitamin D deficiency  - vitamin D3 (CHOLECALCIFEROL) 50 mcg (2000 units) tablet; Take 1 tablet (50 mcg) by mouth daily  Dispense: 90 tablet; Refill: 3    Thank you for coming to [facility] today.   Medical Concerns:  If you have urgent medical concerns please call [phone] at any time of the day.    Cathy Hubbard MD

## 2021-03-08 ENCOUNTER — OFFICE VISIT (OUTPATIENT)
Dept: FAMILY MEDICINE | Facility: CLINIC | Age: 26
End: 2021-03-08
Payer: MEDICAID

## 2021-03-08 VITALS
HEART RATE: 96 BPM | OXYGEN SATURATION: 96 % | TEMPERATURE: 98.4 F | WEIGHT: 75.6 LBS | SYSTOLIC BLOOD PRESSURE: 107 MMHG | HEIGHT: 59 IN | BODY MASS INDEX: 15.24 KG/M2 | DIASTOLIC BLOOD PRESSURE: 73 MMHG

## 2021-03-08 DIAGNOSIS — E44.0 MODERATE MALNUTRITION (H): Primary | ICD-10-CM

## 2021-03-08 DIAGNOSIS — R10.2 PELVIC PAIN IN FEMALE: ICD-10-CM

## 2021-03-08 PROCEDURE — 99213 OFFICE O/P EST LOW 20 MIN: CPT | Performed by: FAMILY MEDICINE

## 2021-03-08 ASSESSMENT — MIFFLIN-ST. JEOR: SCORE: 993.55

## 2021-03-08 NOTE — NURSING NOTE
Due to patient being non-English speaking/uses sign language, an  was used for this visit. Only for face-to-face interpretation by an external agency, date and length of interpretation can be found on the scanned worksheet.     name: Stephania Morin  Language: Citizen of [place]  Agency: [employer]  Phone number: [phone]  Type of interpretation: Face-to-face, spoken

## 2021-03-08 NOTE — PROGRESS NOTES
"    Assessment & Plan     Moderate malnutrition (H)  Discussed weight curve, barriers to weight maintenance and nutritious dietary interventions  Patient expresses understanding, believes medications are helping and recent weight loss due to decreased appetite during menses  Parent expresses support of nutritional guidance  Recommend shakes/supplemets with high protein/high calorie content during menses if unable to tolerate solid food  Pt will return for weight check with PCP in 1 month    Pelvic pain in female  Discussed options for management/workup, pt opts for specialist referral at this time  - OB/GYN REFERRAL - INTERNAL      Return in about 4 weeks (around 4/5/2021) for weight check.    Tonya Stokes DO  [facility] DIALLO Bucio is a 26 year old who presents for the following health issues:    HPI   P/f weight check. Hx of malnutrition. On mirtazepine and seroquel for autism and mood disorder. Had gained weight up to 80 lbs at visit 1mo ago, now down to 75.5lbs. Pt says she has been trying to eat healthy, 3 meals/day and appetite has improved on medications. However, she says she has extremely painful menses and was not able to eat much at all during her recent menses and that is probably why she lost weight. Describes pain as labor contractions despite taking ibuprofen. Denies GERD or nausea. Denies heavy bleeding, only pain. Has never been sexually active.    Review of Systems   Constitutional, HEENT, cardiovascular, pulmonary, gi and gu systems are negative, except as otherwise noted.      Objective    /73   Pulse 96   Temp 98.4  F (36.9  C) (Oral)   Ht 1.499 m (4' 11\")   Wt 34.3 kg (75 lb 9.6 oz)   LMP 03/06/2021   SpO2 96%   BMI 15.27 kg/m    Body mass index is 15.27 kg/m .  Physical Exam   GENERAL: healthy, alert and no distress  NECK: no adenopathy, no asymmetry, masses, or scars and thyroid normal to palpation  RESP: lungs clear to auscultation - no " rales, rhonchi or wheezes  CV: regular rate and rhythm, normal S1 S2, no S3 or S4, no murmur, click or rub, no peripheral edema and peripheral pulses strong  ABDOMEN: soft, nontender, no hepatosplenomegaly, no masses and bowel sounds normal  MS: no gross musculoskeletal defects noted, no edema

## 2021-04-25 NOTE — PROGRESS NOTES
[facility] Progress Note        Assessment & Plan     Dysmenorrhea  Advised to take at the first onset of menses  - ibuprofen (ADVIL/MOTRIN) 400 MG tablet; Take 1 tablet (400 mg) by mouth every 6 hours as needed for moderate pain    Mood disorder (H)    - mirtazapine (REMERON) 30 MG tablet; Take 1 tablet (30 mg) by mouth At Bedtime  - Comprehensive metabolic panel    Chronic allergic rhinitis due to pollen    - fluticasone (FLONASE) 50 MCG/ACT nasal spray; Spray 1-2 sprays into both nostrils daily  - loratadine (CLARITIN) 10 MG tablet; Take 1 tablet (10 mg) by mouth daily    Adjustment disorder with depressed mood  Restart this medication   - QUEtiapine (SEROQUEL) 25 MG tablet; Take 1 tablet (25 mg) by mouth daily AND 2 tablets (50 mg) At Bedtime.    Menorrhagia with regular cycle  Patient was resistant about getting testing though her mother is very concerned.  - CBC with platelets differential  - HCG Qualitative Urine (UPT) ([facility])  - US Pelvic Complete with Transvaginal - In Clinic; Future    Assessment requiring an independent historian(s) - family - mother   Diagnosis or treatment significantly limited by social determinants of health - patient has a developmental disorder and is a vulnerable adult and family only speaks Palestinian and has limited income.  Ordering of each unique test  Prescription drug management             Return in about 4 weeks (around 5/24/2021) for Weight recheck.    Reuben Mixon MD  [facility] DIALLO Bucio is a 26 year old who presents for the following health issues     HPI   Things going well at home, appetite is ok.   Wt Readings from Last 4 Encounters:   04/26/21 34.3 kg (75 lb 9.6 oz)   03/08/21 34.3 kg (75 lb 9.6 oz)   02/02/21 36.4 kg (80 lb 3.2 oz)   12/04/20 34.4 kg (75 lb 12.8 oz)     Weight is stable from one month ago.  Her mother believes she had no weight gain because she was sick with a cold and a heacy period. This inhibited her  "appetite. During her period she doesn't eat much. Only takes ibuprofen when in pain not in prophylactic. Mother reports that this year her period are much heavier, mother is concerned and would like her heavy periods worked up. Denies sexual activity though she has gotten pregnant previously.         Review of Systems         Objective    /71 (BP Location: Left arm, Patient Position: Sitting, Cuff Size: Child)   Pulse 88   Temp 98.9  F (37.2  C) (Oral)   Resp (!) 100   Ht 1.476 m (4' 10.11\")   Wt 34.3 kg (75 lb 9.6 oz)   LMP 04/23/2021 (Exact Date)   Breastfeeding No   BMI 15.74 kg/m    Body mass index is 15.74 kg/m .  Physical Exam  Vitals signs reviewed.   Constitutional:       General: She is not in acute distress.     Appearance: She is well-developed. She is not diaphoretic.   HENT:      Head: Normocephalic.   Eyes:      General: No scleral icterus.     Conjunctiva/sclera: Conjunctivae normal.   Neck:      Musculoskeletal: Normal range of motion.      Thyroid: No thyromegaly.   Cardiovascular:      Rate and Rhythm: Normal rate and regular rhythm.      Heart sounds: Normal heart sounds. No murmur.   Pulmonary:      Effort: Pulmonary effort is normal. No respiratory distress.      Breath sounds: Normal breath sounds. No wheezing.   Abdominal:      General: Bowel sounds are normal. There is no distension.      Palpations: Abdomen is soft. There is no hepatomegaly or splenomegaly.      Tenderness: There is no abdominal tenderness.   Lymphadenopathy:      Cervical: No cervical adenopathy.   Skin:     General: Skin is warm and dry.   Neurological:      Mental Status: She is alert and oriented to person, place, and time.   Psychiatric:         Behavior: Behavior normal.         Thought Content: Thought content normal.         Judgment: Judgment normal.                        "

## 2021-04-26 ENCOUNTER — OFFICE VISIT (OUTPATIENT)
Dept: FAMILY MEDICINE | Facility: CLINIC | Age: 26
End: 2021-04-26
Payer: MEDICAID

## 2021-04-26 ENCOUNTER — TELEPHONE (OUTPATIENT)
Dept: FAMILY MEDICINE | Facility: CLINIC | Age: 26
End: 2021-04-26

## 2021-04-26 VITALS
SYSTOLIC BLOOD PRESSURE: 105 MMHG | DIASTOLIC BLOOD PRESSURE: 71 MMHG | TEMPERATURE: 98.9 F | HEART RATE: 88 BPM | HEIGHT: 58 IN | RESPIRATION RATE: 100 BRPM | WEIGHT: 75.6 LBS | BODY MASS INDEX: 15.87 KG/M2

## 2021-04-26 DIAGNOSIS — N92.0 MENORRHAGIA WITH REGULAR CYCLE: Primary | ICD-10-CM

## 2021-04-26 DIAGNOSIS — F39 MOOD DISORDER (H): ICD-10-CM

## 2021-04-26 DIAGNOSIS — F43.21 ADJUSTMENT DISORDER WITH DEPRESSED MOOD: ICD-10-CM

## 2021-04-26 DIAGNOSIS — N94.6 DYSMENORRHEA: ICD-10-CM

## 2021-04-26 DIAGNOSIS — J30.1 CHRONIC ALLERGIC RHINITIS DUE TO POLLEN: ICD-10-CM

## 2021-04-26 LAB
ALBUMIN SERPL-MCNC: 3.8 G/DL (ref 3.4–5)
ALP SERPL-CCNC: 62 U/L (ref 40–150)
ALT SERPL W P-5'-P-CCNC: 20 U/L (ref 0–50)
ANION GAP SERPL CALCULATED.3IONS-SCNC: 4 MMOL/L (ref 3–14)
AST SERPL W P-5'-P-CCNC: 14 U/L (ref 0–45)
BASOPHILS # BLD AUTO: 0.1 10E9/L (ref 0–0.2)
BASOPHILS NFR BLD AUTO: 2 %
BILIRUB SERPL-MCNC: 0.3 MG/DL (ref 0.2–1.3)
BUN SERPL-MCNC: 13 MG/DL (ref 7–30)
CALCIUM SERPL-MCNC: 9.2 MG/DL (ref 8.5–10.1)
CHLORIDE SERPL-SCNC: 108 MMOL/L (ref 94–109)
CO2 SERPL-SCNC: 27 MMOL/L (ref 20–32)
CREAT SERPL-MCNC: 0.54 MG/DL (ref 0.52–1.04)
DIFFERENTIAL METHOD BLD: ABNORMAL
EOSINOPHIL # BLD AUTO: 0.1 10E9/L (ref 0–0.7)
EOSINOPHIL NFR BLD AUTO: 2 %
ERYTHROCYTE [DISTWIDTH] IN BLOOD BY AUTOMATED COUNT: 14.2 % (ref 10–15)
GFR SERPL CREATININE-BSD FRML MDRD: >90 ML/MIN/{1.73_M2}
GLUCOSE SERPL-MCNC: 76 MG/DL (ref 70–99)
HCG UR QL: NEGATIVE
HCT VFR BLD AUTO: 37.1 % (ref 35–47)
HGB BLD-MCNC: 11.8 G/DL (ref 11.7–15.7)
IMM GRANULOCYTES # BLD: 0 10E9/L (ref 0–0.4)
IMM GRANULOCYTES NFR BLD: 0.3 %
LYMPHOCYTES # BLD AUTO: 1.7 10E9/L (ref 0.8–5.3)
LYMPHOCYTES NFR BLD AUTO: 49.4 %
MCH RBC QN AUTO: 25.4 PG (ref 26.5–33)
MCHC RBC AUTO-ENTMCNC: 31.8 G/DL (ref 31.5–36.5)
MCV RBC AUTO: 80 FL (ref 78–100)
MONOCYTES # BLD AUTO: 0.4 10E9/L (ref 0–1.3)
MONOCYTES NFR BLD AUTO: 11 %
NEUTROPHILS # BLD AUTO: 1.2 10E9/L (ref 1.6–8.3)
NEUTROPHILS NFR BLD AUTO: 35.3 %
NRBC # BLD AUTO: 0 10*3/UL
NRBC BLD AUTO-RTO: 0 /100
PLATELET # BLD AUTO: 264 10E9/L (ref 150–450)
POTASSIUM SERPL-SCNC: 3.7 MMOL/L (ref 3.4–5.3)
PROT SERPL-MCNC: 7.8 G/DL (ref 6.8–8.8)
RBC # BLD AUTO: 4.64 10E12/L (ref 3.8–5.2)
SODIUM SERPL-SCNC: 138 MMOL/L (ref 133–144)
WBC # BLD AUTO: 3.5 10E9/L (ref 4–11)

## 2021-04-26 PROCEDURE — 85025 COMPLETE CBC W/AUTO DIFF WBC: CPT | Performed by: FAMILY MEDICINE

## 2021-04-26 PROCEDURE — 80053 COMPREHEN METABOLIC PANEL: CPT | Performed by: FAMILY MEDICINE

## 2021-04-26 PROCEDURE — 81025 URINE PREGNANCY TEST: CPT | Performed by: FAMILY MEDICINE

## 2021-04-26 PROCEDURE — 36415 COLL VENOUS BLD VENIPUNCTURE: CPT | Performed by: FAMILY MEDICINE

## 2021-04-26 PROCEDURE — 99214 OFFICE O/P EST MOD 30 MIN: CPT | Performed by: FAMILY MEDICINE

## 2021-04-26 RX ORDER — IBUPROFEN 400 MG/1
400 TABLET, FILM COATED ORAL EVERY 6 HOURS PRN
Qty: 100 TABLET | Refills: 4 | Status: SHIPPED | OUTPATIENT
Start: 2021-04-26 | End: 2021-08-19

## 2021-04-26 RX ORDER — LORATADINE 10 MG/1
10 TABLET ORAL DAILY
Qty: 90 TABLET | Refills: 3 | Status: SHIPPED | OUTPATIENT
Start: 2021-04-26 | End: 2021-08-23

## 2021-04-26 RX ORDER — FLUTICASONE PROPIONATE 50 MCG
1-2 SPRAY, SUSPENSION (ML) NASAL DAILY
Qty: 16 G | Refills: 3 | Status: SHIPPED | OUTPATIENT
Start: 2021-04-26 | End: 2021-08-17

## 2021-04-26 RX ORDER — QUETIAPINE FUMARATE 25 MG/1
TABLET, FILM COATED ORAL
Qty: 270 TABLET | Refills: 1 | Status: SHIPPED | OUTPATIENT
Start: 2021-04-26 | End: 2021-08-23

## 2021-04-26 RX ORDER — MIRTAZAPINE 30 MG/1
30 TABLET, FILM COATED ORAL AT BEDTIME
Qty: 90 TABLET | Refills: 3 | Status: SHIPPED | OUTPATIENT
Start: 2021-04-26 | End: 2021-08-23

## 2021-04-26 ASSESSMENT — MIFFLIN-ST. JEOR: SCORE: 979.42

## 2021-04-26 NOTE — LETTER
April 26, 2021    Rupinder Spaulding  [address]  [place] [address]      Dear: Rupinder Spaulding    You were recently referred for an Ultrasound appointment by your primary care provider at [facility].  We have called to schedule this appointment, but have been unable to reach you.  If you are interested in receiving this service, please call [facility] at [phone].  We would be happy to schedule this appointment for you.      Thank you.      Sincerely,    Patient Representative    [facility]  Hours:  Monday-Friday 8:00 am - 5:00 pm   Phone Number: [phone]

## 2021-04-26 NOTE — PATIENT INSTRUCTIONS
Here is the plan from today's visit    1. Dysmenorrhea  Take take three times daily when period starts  - ibuprofen (ADVIL/MOTRIN) 400 MG tablet; Take 1 tablet (400 mg) by mouth every 6 hours as needed for moderate pain  Dispense: 100 tablet; Refill: 4    2. Mood disorder (H)  restart  - mirtazapine (REMERON) 30 MG tablet; Take 1 tablet (30 mg) by mouth At Bedtime  Dispense: 90 tablet; Refill: 3  - Comprehensive metabolic panel    3. Chronic allergic rhinitis due to pollen    - fluticasone (FLONASE) 50 MCG/ACT nasal spray; Spray 1-2 sprays into both nostrils daily  Dispense: 16 g; Refill: 3  - loratadine (CLARITIN) 10 MG tablet; Take 1 tablet (10 mg) by mouth daily  Dispense: 90 tablet; Refill: 3    4. Adjustment disorder with depressed mood  Restart please  - QUEtiapine (SEROQUEL) 25 MG tablet; Take 1 tablet (25 mg) by mouth daily AND 2 tablets (50 mg) At Bedtime.  Dispense: 270 tablet; Refill: 1    5. Menorrhagia with regular cycle    - CBC with platelets differential  - HCG Qualitative Urine (UPT) ([facility])  - US Pelvic Complete with Transvaginal - In Clinic; Future    Schedule Jeffrey rm shot at   Please call or return to clinic if your symptoms don't go away.    Follow up plan  Return in about 4 weeks (around 5/24/2021) for Weight recheck.     Thank you for coming to [facility] today.  Lab Testing:  **If you had lab testing today and your results are reassuring or normal they will be mailed to you or sent through [employer] within 7 days.   **If the lab tests need quick action we will call you with the results.  The phone number we will call with results is # [phone] (home) . If this is not the best number please call our clinic and change the number.  Medication Refills:  If you need any refills please call your pharmacy and they will contact us.   If you need to  your refill at a new pharmacy, please contact the new pharmacy directly. The new pharmacy will help you get your  medications transferred faster.   Scheduling:  If you have any concerns about today's visit or wish to schedule another appointment please call our office during normal business hours [phone] (8-5:00 M-F)   eferrals to [facility] please call [phone].   Mammogram Scheduling [phone]     XRay/CT/Ultrasound/MRI Scheduling [phone]    Medical Concerns:  If you have urgent medical concerns please call [phone] at any time of the day.    Reuben Mixon MD

## 2021-04-26 NOTE — LETTER
April 27, 2021    [id number]    Rupinder Spaulding  [address]  [facility] [address]    Dear Rupinder Spaulding.      Please see below for your results from your recent testing.  Your results are reassuring.If you have questions please contact the clinic to make an appointment with  me or your primary doctor to discuss the results. Citizen of [place] translation:waxaan ashley kuu gu xaqiijinay jawaabtaadii hadaad suaal qabto fadlan tyrese wac dhaqtarka kuu qaaska ah oo ka qabso elana si uu kuugu sharxo jawaabtaada.    Resulted Orders   Comprehensive metabolic panel   Result Value Ref Range    Sodium 138 133 - 144 mmol/L    Potassium 3.7 3.4 - 5.3 mmol/L    Chloride 108 94 - 109 mmol/L    Carbon Dioxide 27 20 - 32 mmol/L    Anion Gap 4 3 - 14 mmol/L    Glucose 76 70 - 99 mg/dL    Urea Nitrogen 13 7 - 30 mg/dL    Creatinine 0.54 0.52 - 1.04 mg/dL    GFR Estimate >90 >60 mL/min/[1.73_m2]      Comment:      Non  GFR Calc  Starting 12/18/2018, serum creatinine based estimated GFR (eGFR) will be   calculated using the Chronic Kidney Disease Epidemiology Collaboration   (CKD-EPI) equation.      GFR Estimate If Black >90 >60 mL/min/[1.73_m2]      Comment:       GFR Calc  Starting 12/18/2018, serum creatinine based estimated GFR (eGFR) will be   calculated using the Chronic Kidney Disease Epidemiology Collaboration   (CKD-EPI) equation.      Calcium 9.2 8.5 - 10.1 mg/dL    Bilirubin Total 0.3 0.2 - 1.3 mg/dL    Albumin 3.8 3.4 - 5.0 g/dL    Protein Total 7.8 6.8 - 8.8 g/dL    Alkaline Phosphatase 62 40 - 150 U/L    ALT 20 0 - 50 U/L    AST 14 0 - 45 U/L   CBC with platelets differential   Result Value Ref Range    WBC 3.5 (L) 4.0 - 11.0 10e9/L    RBC Count 4.64 3.8 - 5.2 10e12/L    Hemoglobin 11.8 11.7 - 15.7 g/dL    Hematocrit 37.1 35.0 - 47.0 %    MCV 80 78 - 100 fl    MCH 25.4 (L) 26.5 - 33.0 pg    MCHC 31.8 31.5 - 36.5 g/dL    RDW 14.2 10.0 - 15.0 %    Platelet Count 264 150 - 450 10e9/L    Diff Method Automated  Method     % Neutrophils 35.3 %    % Lymphocytes 49.4 %    % Monocytes 11.0 %    % Eosinophils 2.0 %    % Basophils 2.0 %    % Immature Granulocytes 0.3 %    Nucleated RBCs 0 0 /100    Absolute Neutrophil 1.2 (L) 1.6 - 8.3 10e9/L    Absolute Lymphocytes 1.7 0.8 - 5.3 10e9/L    Absolute Monocytes 0.4 0.0 - 1.3 10e9/L    Absolute Eosinophils 0.1 0.0 - 0.7 10e9/L    Absolute Basophils 0.1 0.0 - 0.2 10e9/L    Abs Immature Granulocytes 0.0 0 - 0.4 10e9/L    Absolute Nucleated RBC 0.0    HCG Qualitative Urine (UPT) ([facility])   Result Value Ref Range    HCG Qual Urine Negative Negative           Sincerely   Reuben Mixon MD  .

## 2021-04-26 NOTE — CONFIDENTIAL NOTE
Called patient using [employer] (ID# [id number]) to schedule US appointment. No answer (kept ringing); unable to leave voicemail. Sending letter.

## 2021-04-30 ENCOUNTER — IMMUNIZATION (OUTPATIENT)
Dept: NURSING | Facility: CLINIC | Age: 26
End: 2021-04-30
Payer: MEDICAID

## 2021-04-30 PROCEDURE — T1013 SIGN LANG/ORAL INTERPRETER: HCPCS | Mod: GT

## 2021-04-30 PROCEDURE — 91300 PR COVID VAC PFIZER DIL RECON 30 MCG/0.3 ML IM: CPT

## 2021-04-30 PROCEDURE — 0001A PR COVID VAC PFIZER DIL RECON 30 MCG/0.3 ML IM: CPT

## 2021-05-18 ENCOUNTER — OFFICE VISIT (OUTPATIENT)
Dept: FAMILY MEDICINE | Facility: CLINIC | Age: 26
End: 2021-05-18
Payer: MEDICAID

## 2021-05-18 VITALS
DIASTOLIC BLOOD PRESSURE: 72 MMHG | WEIGHT: 76.8 LBS | HEIGHT: 58 IN | BODY MASS INDEX: 16.12 KG/M2 | OXYGEN SATURATION: 100 % | RESPIRATION RATE: 14 BRPM | HEART RATE: 71 BPM | SYSTOLIC BLOOD PRESSURE: 104 MMHG | TEMPERATURE: 98.4 F

## 2021-05-18 DIAGNOSIS — K21.9 GASTROESOPHAGEAL REFLUX DISEASE WITHOUT ESOPHAGITIS: Primary | ICD-10-CM

## 2021-05-18 DIAGNOSIS — E63.9 UNDERNUTRITION: ICD-10-CM

## 2021-05-18 DIAGNOSIS — R63.4 WEIGHT LOSS: ICD-10-CM

## 2021-05-18 DIAGNOSIS — R63.6 UNDERWEIGHT: ICD-10-CM

## 2021-05-18 DIAGNOSIS — E44.0 MODERATE MALNUTRITION (H): ICD-10-CM

## 2021-05-18 PROCEDURE — 99213 OFFICE O/P EST LOW 20 MIN: CPT | Mod: GC | Performed by: STUDENT IN AN ORGANIZED HEALTH CARE EDUCATION/TRAINING PROGRAM

## 2021-05-18 RX ORDER — FAMOTIDINE 20 MG/1
20 TABLET, FILM COATED ORAL 2 TIMES DAILY PRN
Qty: 90 TABLET | Refills: 3 | Status: SHIPPED | OUTPATIENT
Start: 2021-05-18 | End: 2021-08-23

## 2021-05-18 ASSESSMENT — MIFFLIN-ST. JEOR: SCORE: 984.86

## 2021-05-18 NOTE — NURSING NOTE
Due to patient being non-English speaking/uses sign language, an  was used for this visit. Only for face-to-face interpretation by an external agency, date and length of interpretation can be found on the scanned worksheet.     name: Ramona Law  Agency: [employer]  Language: Cypriot   Telephone number: [phone]  Type of interpretation: Face-to-face, spoken

## 2021-05-18 NOTE — PROGRESS NOTES
Assessment & Plan     Gastroesophageal reflux disease without esophagitis  Patient has a history of GERD. Continues to have nausea and heartburn symptoms, which worsens during her menses, which causes her to decrease oral intake during that time. We will trial famotidine for symptoms and see if this is able to improve her intake. She has previously had h pylori testing for this about 1 year ago, per mother, which was reportedly negative.  - famotidine (PEPCID) 20 MG tablet  Dispense: 90 tablet; Refill: 3    Moderate malnutrition (H)  Undernutrition  Weight loss  Underweight  Patient has a long history of being underweight and malnutrition. She is already on mirtazapine 30 mg, which has helped somewhat with her intake. She has had 1 lb weight gain since her last visit. She had ultrasound of pelvis ordered previously, which they thought they were here for today. We were able to get them scheduled for ultrasound to be completed tomorrow. This is primarily to assess pelvic organs in the context of pain with menses, which has been affecting her oral intake. I do think this patient would benefit from nutrition consult, so a referral is placed. She should plan to follow up in about 1 month for weight check.  - NUTRITION REFERRAL - INTERNAL      Return in about 1 month (around 6/18/2021) for weight check.    Abby King MD  [facility] DIALLO Bucio is a 26 year old who presents for the following health issues   Chief Complaint   Patient presents with     Weight Check     patient here for a weight recheck and follow up/ no other questions or concerns peR PATIENT.      HPI   Most of the history of given by mother:   Period - still has pain  Any problem, too.   Not eating much and complains of pain with period.   Periods every month. Last 5 days.   When has period, has pain - groin area.   Nausea during periods -> yes. Only present during periods.   Physical activity - not very active.  "  Foods/diet - ensure milk, sometimes rice, spaghetti.  Types of foods she doesn't like/can't eat -> doesn't like egg, just doesn't like flavor, doesn't seem to be a texture thing.   How many meals a day -> 3 times a day.   Mood - doing okay    Weight gain since last time 75 lb 9.6 oz -> 76 lb 12.8 oz    Concern is that she needs to be gaining weight.  Specialist?     Vitamin d and multivitamin   Bowel movements -> doing alright these days.     PHQ 7/31/2019 11/1/2019 8/5/2020   PHQ-9 Total Score - 0 14   Q9: Thoughts of better off dead/self-harm past 2 weeks Not at all Not at all Not at all         Review of Systems   Constitutional, HEENT, cardiovascular, pulmonary, gi and gu systems are negative, except as otherwise noted.      Objective    /72   Pulse 71   Temp 98.4  F (36.9  C) (Oral)   Resp 14   Ht 1.476 m (4' 10.11\")   Wt 34.8 kg (76 lb 12.8 oz)   LMP 05/14/2021 (Exact Date)   SpO2 100%   BMI 15.99 kg/m    Body mass index is 15.99 kg/m .  Physical Exam  Constitutional:       Appearance: Normal appearance.   HENT:      Head: Normocephalic.   Eyes:      General: No scleral icterus.     Extraocular Movements: Extraocular movements intact.      Conjunctiva/sclera: Conjunctivae normal.   Neck:      Musculoskeletal: Normal range of motion.   Cardiovascular:      Rate and Rhythm: Normal rate.   Pulmonary:      Effort: Pulmonary effort is normal.   Musculoskeletal: Normal range of motion.   Neurological:      General: No focal deficit present.      Mental Status: She is alert and oriented to person, place, and time.   Psychiatric:      Comments: Patient timid, only talks with mother and infrequently. Frequently appears to become upset.            This office note has been dictated.          "

## 2021-05-18 NOTE — PATIENT INSTRUCTIONS
Patient Education   Here is the plan from today's visit    1. Gastroesophageal reflux disease without esophagitis  - famotidine (PEPCID) 20 MG tablet; Take 1 tablet (20 mg) by mouth 2 times daily as needed (heartburn, nausea)  Dispense: 90 tablet; Refill: 3    2. Moderate malnutrition (H)  - NUTRITION REFERRAL - INTERNAL    3. Undernutrition  - NUTRITION REFERRAL - INTERNAL    4. Weight loss  - NUTRITION REFERRAL - INTERNAL    5. Underweight  - NUTRITION REFERRAL - INTERNAL      Please call or return to clinic if your symptoms don't go away.    Follow up plan  No follow-ups on file.    Thank you for coming to [facility] today.  Lab Testing:  **If you had lab testing today and your results are reassuring or normal they will be mailed to you or sent through [email or web address] within 7 days.   **If the lab tests need quick action we will call you with the results.  **If you are having labs done on a different day, please call [phone] to schedule at [facility] or [phone] for other [facility] locations.   The phone number we will call with results is # [phone] (home) . If this is not the best number please call our clinic and change the number.  Medication Refills:  If you need any refills please call your pharmacy and they will contact us.   If you need to  your refill at a new pharmacy, please contact the new pharmacy directly. The new pharmacy will help you get your medications transferred faster.   Scheduling:  If you have any concerns about today's visit or wish to schedule another appointment please call our office during normal business hours [phone] (8-5:00 M-F)  If a referral was made to a [facility] and you don't get a call from central scheduling please call [phone].  If a Mammogram was ordered for you at [facility] call [phone] to schedule or change your appointment.  If you had an EKG/XRay/CT/Ultrasound/MRI ordered the number is  [phone] to schedule or change your radiology appointment.   Medical Concerns:  If you have urgent medical concerns please call [phone] at any time of the day.    Abby King MD         If unable to schedule an appointment with your doctor, schedule with someone else on their clinic team.           If unable to schedule an appointment with your doctor, schedule with someone else on their clinic team.

## 2021-05-19 DIAGNOSIS — N92.0 MENORRHAGIA WITH REGULAR CYCLE: ICD-10-CM

## 2021-05-19 PROCEDURE — 76856 US EXAM PELVIC COMPLETE: CPT | Performed by: RADIOLOGY

## 2021-05-19 NOTE — NURSING NOTE
Patient  (and patient's mother) declined transvaginal exam.    Due to patient being non-English speaking/uses sign language, an  was used for this visit. Only for face-to-face interpretation by an external agency, date and length of interpretation can be found on the scanned worksheet.     name: Manny #[id number]  Agency: [employer] - telephone  Language: Emirati   Telephone number: [phone]  Type of interpretation: Telephone, spoken

## 2021-05-21 ENCOUNTER — IMMUNIZATION (OUTPATIENT)
Dept: NURSING | Facility: CLINIC | Age: 26
End: 2021-05-21
Attending: INTERNAL MEDICINE
Payer: MEDICAID

## 2021-05-21 ENCOUNTER — APPOINTMENT (OUTPATIENT)
Dept: INTERPRETER SERVICES | Facility: CLINIC | Age: 26
End: 2021-05-21
Payer: MEDICAID

## 2021-05-21 PROCEDURE — 0002A PR COVID VAC PFIZER DIL RECON 30 MCG/0.3 ML IM: CPT

## 2021-05-21 PROCEDURE — 91300 PR COVID VAC PFIZER DIL RECON 30 MCG/0.3 ML IM: CPT

## 2021-07-09 ENCOUNTER — VIRTUAL VISIT (OUTPATIENT)
Dept: ONCOLOGY | Facility: CLINIC | Age: 26
End: 2021-07-09
Attending: FAMILY MEDICINE
Payer: MEDICAID

## 2021-07-09 DIAGNOSIS — E44.0 MODERATE MALNUTRITION (H): Primary | ICD-10-CM

## 2021-07-09 DIAGNOSIS — R63.4 WEIGHT LOSS: ICD-10-CM

## 2021-07-09 PROCEDURE — 97802 MEDICAL NUTRITION INDIV IN: CPT | Mod: 95 | Performed by: DIETITIAN, REGISTERED

## 2021-07-09 NOTE — LETTER
"    7/9/2021         RE: Rupinder Spaulding  [address]  [facility] [address]        Dear Colleague,    Thank you for referring your patient, Rupinder Spaulding, to the [facility]. Please see a copy of my visit note below.    The patient has been notified of the following:      \"We have found that certain health care needs can be provided without the need for a face to face visit.  This service lets us provide the care you need with a phone conversation.       I will have full access to your [place] medical record during this entire phone call.   I will be taking notes for your medical record.      Since this is like an office visit, we will bill your insurance company for this service.       There are potential benefits and risks of telephone visits (e.g. limits to patient confidentiality) that differ from in-person visits.?  Confidentiality still applies for telephone services, and nobody will record the visit.  It is important to be in a quiet, private space that is free of distractions (including cell phone or other devices) during the visit.??      If during the course of the call I believe a telephone visit is not appropriate, you will not be charged for this service\"     Consent has been obtained for this service by care team member: Yes     CLINICAL NUTRITION SERVICES - ASSESSMENT NOTE    Rupinder Spaulding 26 year old referred for MNT related underweight, malnutrition    Time Spent: 45 minutes  Visit Type: phone  Pt accompanied by: Adelaida , patient's mother  Referring Physician: Abby King 5/18/21  E44.0 (ICD-10-CM) - Moderate malnutrition (H)  E63.9 (ICD-10-CM) - Undernutrition  R63.6 (ICD-10-CM) - Underweight    NUTRITION HISTORY  Factors affecting nutrition intake include:decreased appetite  Current diet/appetite: general diet    Food Security: any concerns about having enough money to buy food or access to grocery stores? No      Rupinder's mother tells me that her appetite is " "sporadic and if she gets agitated for any reason, she doesn't eat.   She typically has 3 very small meals daily.  She has been giving her Fairlife milk which she likes but doesn't drink every day spending on her mood.   She eats mostly breads, oatmeal and soups.  Her mother is adding oil to her foods.   She thinks the Remeron may be helping her mood and appetite.     Diet Recall  Breakfast Injera, oatmeal with milk or bread, water   Lunch Pasta, rice with oil - small portions   Dinner Pasta, rice with oil- small portions   Snacks Minimal to no snacks    Beverages Fairlife Milk     ANTHROPOMETRICS  Height: 4'10\"  Weight: 76 lb/34kg  BMI: 16  Weight Status:  Underweight BMI <18.5  IBW: 95 lb (80%)  Weight History:   Wt Readings from Last 6 Encounters:   05/18/21 34.8 kg (76 lb 12.8 oz)   04/26/21 34.3 kg (75 lb 9.6 oz)   03/08/21 34.3 kg (75 lb 9.6 oz)   02/02/21 36.4 kg (80 lb 3.2 oz)   12/04/20 34.4 kg (75 lb 12.8 oz)   09/21/20 33.7 kg (74 lb 3.2 oz)     Dosing Weight: 34kg    Medications/vitamins/minerals/herbals:   Reviewed    Labs:   Labs reviewed    NUTRITION FOCUSED PHYSICAL ASSESSMENT FOR DIAGNOSING MALNUTRITION:  Not observed due to Covid 19 pandemic - no clinic contact  Consult for education only      ASSESSED NUTRITION NEEDS:  Estimated Energy Needs: 4870-4809 kcals (35-40 Kcal/Kg)  Justification: repletion  Estimated Protein Needs: 40-60 grams protein (1.2-1.5 g pro/Kg)  Justification: Repletion  Estimated Fluid Needs: 1400  mL   Justification: maintenance    NUTRITION DIAGNOSIS:  Inadequate oral intake related to decreased appetite as evidenced by pt report, unable to gain weight despite adding food source to diet.     INTERVENTIONS  Provided written & verbal education:     - Reviewed nutrition needs for weight gain. Advised pt to aim for at least 1400kcal and 40-60g protein daily.   - Discussed strategies to help fortify meals and snacks.  - Encouraged to focus on small, frequent meals.    - Reviewed " oral nutrition supplement options (Ensure Enlive, Ensure Plus/Boost Plus, CIB with Fairlife milk etc). Encouraged utilizing these ONS in home made shakes/smoothies to prevent flavor fatigue.  Encouraged to start taking 2 nutrition shakes/day     Provided pt with corresponding education materials/handouts on:  Tips to increase calories in diet  ONS coupons mailed    Pt verbalize understanding of materials provided during consult.   Patient Understanding: Excellent  Expected patient engagement: Excellent     Goals  1.  Aim for 5-6 small frequent meals  2.  Aim for 1400kcal and 40-60g protein/day  3. Weight gain towards 85 lb    Follow-Up Plans: Pt has RD contact information for questions.      Aretha López RD, LD          Again, thank you for allowing me to participate in the care of your patient.        Sincerely,        Aretha López RD

## 2021-07-09 NOTE — PROGRESS NOTES
"The patient has been notified of the following:      \"We have found that certain health care needs can be provided without the need for a face to face visit.  This service lets us provide the care you need with a phone conversation.       I will have full access to your [place] medical record during this entire phone call.   I will be taking notes for your medical record.      Since this is like an office visit, we will bill your insurance company for this service.       There are potential benefits and risks of telephone visits (e.g. limits to patient confidentiality) that differ from in-person visits.?  Confidentiality still applies for telephone services, and nobody will record the visit.  It is important to be in a quiet, private space that is free of distractions (including cell phone or other devices) during the visit.??      If during the course of the call I believe a telephone visit is not appropriate, you will not be charged for this service\"     Consent has been obtained for this service by care team member: Yes     CLINICAL NUTRITION SERVICES - ASSESSMENT NOTE    Rupinder Spaulding 26 year old referred for MNT related underweight, malnutrition    Time Spent: 45 minutes  Visit Type: phone  Pt accompanied by: Adelaida , patient's mother  Referring Physician: Abby King 5/18/21  E44.0 (ICD-10-CM) - Moderate malnutrition (H)  E63.9 (ICD-10-CM) - Undernutrition  R63.6 (ICD-10-CM) - Underweight    NUTRITION HISTORY  Factors affecting nutrition intake include:decreased appetite  Current diet/appetite: general diet    Food Security: any concerns about having enough money to buy food or access to grocery stores? No      Rupinder's mother tells me that her appetite is sporadic and if she gets agitated for any reason, she doesn't eat.   She typically has 3 very small meals daily.  She has been giving her Fairlife milk which she likes but doesn't drink every day spending on her mood.   She eats mostly breads, " "oatmeal and soups.  Her mother is adding oil to her foods.   She thinks the Remeron may be helping her mood and appetite.     Diet Recall  Breakfast Injera, oatmeal with milk or bread, water   Lunch Pasta, rice with oil - small portions   Dinner Pasta, rice with oil- small portions   Snacks Minimal to no snacks    Beverages Fairlife Milk     ANTHROPOMETRICS  Height: 4'10\"  Weight: 76 lb/34kg  BMI: 16  Weight Status:  Underweight BMI <18.5  IBW: 95 lb (80%)  Weight History:   Wt Readings from Last 6 Encounters:   05/18/21 34.8 kg (76 lb 12.8 oz)   04/26/21 34.3 kg (75 lb 9.6 oz)   03/08/21 34.3 kg (75 lb 9.6 oz)   02/02/21 36.4 kg (80 lb 3.2 oz)   12/04/20 34.4 kg (75 lb 12.8 oz)   09/21/20 33.7 kg (74 lb 3.2 oz)     Dosing Weight: 34kg    Medications/vitamins/minerals/herbals:   Reviewed    Labs:   Labs reviewed    NUTRITION FOCUSED PHYSICAL ASSESSMENT FOR DIAGNOSING MALNUTRITION:  Not observed due to Covid 19 pandemic - no clinic contact  Consult for education only      ASSESSED NUTRITION NEEDS:  Estimated Energy Needs: 4099-7587 kcals (35-40 Kcal/Kg)  Justification: repletion  Estimated Protein Needs: 40-60 grams protein (1.2-1.5 g pro/Kg)  Justification: Repletion  Estimated Fluid Needs: 1400  mL   Justification: maintenance    NUTRITION DIAGNOSIS:  Inadequate oral intake related to decreased appetite as evidenced by pt report, unable to gain weight despite adding food source to diet.     INTERVENTIONS  Provided written & verbal education:     - Reviewed nutrition needs for weight gain. Advised pt to aim for at least 1400kcal and 40-60g protein daily.   - Discussed strategies to help fortify meals and snacks.  - Encouraged to focus on small, frequent meals.    - Reviewed oral nutrition supplement options (Ensure Enlive, Ensure Plus/Boost Plus, CIB with Fairlife milk etc). Encouraged utilizing these ONS in home made shakes/smoothies to prevent flavor fatigue.  Encouraged to start taking 2 nutrition shakes/day "     Provided pt with corresponding education materials/handouts on:  Tips to increase calories in diet  ONS coupons mailed    Pt verbalize understanding of materials provided during consult.   Patient Understanding: Excellent  Expected patient engagement: Excellent     Goals  1.  Aim for 5-6 small frequent meals  2.  Aim for 1400kcal and 40-60g protein/day  3. Weight gain towards 85 lb    Follow-Up Plans: Pt has RD contact information for questions.      Aretha López RD, LD

## 2021-07-30 ENCOUNTER — OFFICE VISIT (OUTPATIENT)
Dept: FAMILY MEDICINE | Facility: CLINIC | Age: 26
End: 2021-07-30
Payer: MEDICAID

## 2021-07-30 VITALS
BODY MASS INDEX: 15.87 KG/M2 | WEIGHT: 75.6 LBS | TEMPERATURE: 98.3 F | HEIGHT: 58 IN | SYSTOLIC BLOOD PRESSURE: 105 MMHG | HEART RATE: 78 BPM | OXYGEN SATURATION: 99 % | RESPIRATION RATE: 16 BRPM | DIASTOLIC BLOOD PRESSURE: 69 MMHG

## 2021-07-30 DIAGNOSIS — R63.6 LOW BODY WEIGHT DUE TO INADEQUATE CALORIC INTAKE: Primary | ICD-10-CM

## 2021-07-30 PROCEDURE — 99213 OFFICE O/P EST LOW 20 MIN: CPT | Performed by: FAMILY MEDICINE

## 2021-07-30 ASSESSMENT — MIFFLIN-ST. JEOR: SCORE: 981.92

## 2021-07-30 NOTE — PROGRESS NOTES
Assessment & Plan     1) Undernutrition: Weight decreased a little since last visit, currently at 75 lb 9.6 oz, but she is stable around the 76 lb range. Unclear if her appetite is good or not. Patient reports appetite and meals are going well, but mother states this is not the case.    - Continue to monitor for any significant weight changes.    - Per nutrition services, goal is to get Rupinder to 85 lb. Was advised by registered dietician to aim for 1400 kcal and 40-60 g protein/day.    - Plan is to follow up with Dr. Mixon in Rupinder's August visit    [id number]}     FUTURE APPOINTMENTS:       - Follow-up visit in August with Dr. Mixon     No follow-ups on file.    Cristian Rivera, MS3      Unfortunately, while asking history about patients appetite and if there was any nausea, patient suddenly became upset and left the room and refused to discuss further.  Patient's Mom said that patient is eating very little, while patient had told medical student she felt she was eating well.  So there seems to be some discrepancy between perception.  Certainly an eating disorder is in the differential.  I may have been able to do better to discuss this with the patient individually.    Oscar Pedroza MD  [facility] DIALLO Bucio is a 26 year old who presents for the following health issues accompanied by her mother: weight check.   Utilized a [employer]  for this visit    HPI     Wt Readings from Last 4 Encounters:   07/30/21 34.3 kg (75 lb 9.6 oz)   05/18/21 34.8 kg (76 lb 12.8 oz)   04/26/21 34.3 kg (75 lb 9.6 oz)   03/08/21 34.3 kg (75 lb 9.6 oz)     Weight dropped a little bit since last visit, today it is 75 lb 9.6 oz, but she is stable around the 76 lb range. Rupinder and her mother informed of this weight trend. On initial interview, patient reported good appetite. She reports eating 3 meals a day, usually consisting of pasta, rice, fruits, vegetables. She also states that she is utilizing  "nutrition supplements (reports Ensure 2x a day). States that both constipation and GERD are no longer a problem.     Later in the visit, Rupinder's mother reported that Rupinder's appetite is actually not very good. It appears that Rupinder is not eating much. She also reports Rupinder gets easily agitated, especially when talking about her weight. Upon asking Rupinder if she ever felt sick to her stomach, Rupinder became upset and left the room. The mother elected to end the visit at this time. She reports that she will be back in August to see Dr. Mixon.     Review of Systems   CONSTITUTIONAL: NEGATIVE for fever, chills, change in weight  RESP: NEGATIVE for significant cough or SOB  CV: NEGATIVE for chest pain, palpitations   GI: NEGATIVE for abdominal pain and constipation  PSYCHIATRIC: POSITIVE for agitation per mother's report. PHQ-9 score is 0.       Objective    /69   Pulse 78   Temp 98.3  F (36.8  C) (Oral)   Resp 16   Ht 1.48 m (4' 10.27\")   Wt 34.3 kg (75 lb 9.6 oz)   LMP 07/21/2021 (Exact Date)   SpO2 99%   BMI 15.66 kg/m    Body mass index is 15.66 kg/m .     Physical Exam   GENERAL: Healthy, alert  EYES: Eyes grossly normal to inspection  RESP: Normal respiratory effort. No apparent use of accessory respiratory muscles. Able to participate in a 15 min conversation with no respiratory distress.   NEURO: Mentation intact, speech normal, gait normal   PSYCH: Agitation    The rest of the physical exam was unable to be performed due to patient electing to end visit.     ----- Services Performed by a MEDICAL STUDENT in Presence of ATTENDING Physician-------        "

## 2021-07-30 NOTE — NURSING NOTE
Due to patient being non-English speaking/uses sign language, an  was used for this visit. Only for face-to-face interpretation by an external agency, date and length of interpretation can be found on the scanned worksheet.     name: [id number]  Agency: [employer] - telephone  Language: Malagasy   Telephone number: [phone]  Type of interpretation: Telephone, spoken

## 2021-08-03 NOTE — PROGRESS NOTES
Preceptor Attestation:  I was present with the medical student who participated in the service and in the documentation of this note. I have verified the history and personally performed the physical exam and medical decision making. I have verified the content of the note, which accurately reflects my assessment of the patient and the plan of care.  >20 minutes spent on this patient in patient care and in documentation.   Supervising Physician:  Oscar Pedroza MD.

## 2021-08-04 ENCOUNTER — DOCUMENTATION ONLY (OUTPATIENT)
Dept: FAMILY MEDICINE | Facility: CLINIC | Age: 26
End: 2021-08-04

## 2021-08-04 ASSESSMENT — PATIENT HEALTH QUESTIONNAIRE - PHQ9: SUM OF ALL RESPONSES TO PHQ QUESTIONS 1-9: 0

## 2021-08-04 NOTE — PROGRESS NOTES
"When opening a documentation only encounter, be sure to enter in \"Chief Complaint\" Forms and in \" Comments\" Title of form, description if needed.    Rupinder is a 26 year old  female  Form received via: Fax  Form now resides in: Provider Ready    Vangie Colindres MA      Form has been completed by provider.     Form sent out via: Fax to [employer] at Fax Number: [phone]  Patient informed: No, Reason:n/a  Output date: August 4, 2021    Vangie Colindres MA      **Please close the encounter**                    "

## 2021-08-17 DIAGNOSIS — J30.1 CHRONIC ALLERGIC RHINITIS DUE TO POLLEN: ICD-10-CM

## 2021-08-17 RX ORDER — FLUTICASONE PROPIONATE 50 MCG
1-2 SPRAY, SUSPENSION (ML) NASAL DAILY
Qty: 16 G | Refills: 3 | Status: SHIPPED | OUTPATIENT
Start: 2021-08-17 | End: 2021-08-23

## 2021-08-17 NOTE — TELEPHONE ENCOUNTER
"Request for medication refill:fluticasone (FLONASE) 50 MCG/ACT nasal spray    Providers if patient needs an appointment and you are willing to give a one month supply please refill for one month and  send a letter/MyChart using \".SMILLIMITEDREFILL\" .smillimited and route chart to \"P [facility] \" (Giving one month refill in non controlled medications is strongly recommended before denial)    If refill has been denied, meaning absolutely no refills without visit, please complete the smart phrase \".smirxrefuse\" and route it to the \"P [facility] MED REFILLS\"  pool to inform the patient and the pharmacy.    Thuy Antonio        "

## 2021-08-19 DIAGNOSIS — N94.6 DYSMENORRHEA: ICD-10-CM

## 2021-08-19 DIAGNOSIS — K59.01 SLOW TRANSIT CONSTIPATION: ICD-10-CM

## 2021-08-19 RX ORDER — POLYETHYLENE GLYCOL 3350 17 G/17G
1 POWDER, FOR SOLUTION ORAL DAILY
Qty: 507 G | Refills: 3 | Status: SHIPPED | OUTPATIENT
Start: 2021-08-19 | End: 2021-08-23

## 2021-08-19 RX ORDER — IBUPROFEN 400 MG/1
400 TABLET, FILM COATED ORAL EVERY 6 HOURS PRN
Qty: 100 TABLET | Refills: 4 | Status: SHIPPED | OUTPATIENT
Start: 2021-08-19 | End: 2021-08-23

## 2021-08-19 NOTE — TELEPHONE ENCOUNTER
"Request for medication refill:    Ibuprofen 400 mg    Providers if patient needs an appointment and you are willing to give a one month supply please refill for one month and  send a letter/MyChart using \".SMILLIMITEDREFILL\" .smillimited and route chart to \"P [facility]MI \" (Giving one month refill in non controlled medications is strongly recommended before denial)    If refill has been denied, meaning absolutely no refills without visit, please complete the smart phrase \".smirxrefuse\" and route it to the \"P SMI MED REFILLS\"  pool to inform the patient and the pharmacy.    Marge Ryan, CMA            "

## 2021-08-19 NOTE — TELEPHONE ENCOUNTER
"Request for medication refill:  polyethylene glycol (MIRALAX) 17 GM/Dose powder  Providers if patient needs an appointment and you are willing to give a one month supply please refill for one month and  send a letter/MyChart using \".SMILLIMITEDREFILL\" .smillimited and route chart to \"P [facility] \" (Giving one month refill in non controlled medications is strongly recommended before denial)    If refill has been denied, meaning absolutely no refills without visit, please complete the smart phrase \".smirxrefuse\" and route it to the \"P [facility] MED REFILLS\"  pool to inform the patient and the pharmacy.    Christelle Pope        "

## 2021-08-23 ENCOUNTER — OFFICE VISIT (OUTPATIENT)
Dept: FAMILY MEDICINE | Facility: CLINIC | Age: 26
End: 2021-08-23
Payer: MEDICAID

## 2021-08-23 VITALS
HEIGHT: 58 IN | RESPIRATION RATE: 16 BRPM | OXYGEN SATURATION: 98 % | HEART RATE: 88 BPM | SYSTOLIC BLOOD PRESSURE: 100 MMHG | TEMPERATURE: 98.1 F | WEIGHT: 75.2 LBS | BODY MASS INDEX: 15.79 KG/M2 | DIASTOLIC BLOOD PRESSURE: 68 MMHG

## 2021-08-23 DIAGNOSIS — J30.1 CHRONIC ALLERGIC RHINITIS DUE TO POLLEN: ICD-10-CM

## 2021-08-23 DIAGNOSIS — K59.01 SLOW TRANSIT CONSTIPATION: ICD-10-CM

## 2021-08-23 DIAGNOSIS — E55.9 VITAMIN D DEFICIENCY: ICD-10-CM

## 2021-08-23 DIAGNOSIS — K21.9 GASTROESOPHAGEAL REFLUX DISEASE WITHOUT ESOPHAGITIS: ICD-10-CM

## 2021-08-23 DIAGNOSIS — N94.6 DYSMENORRHEA: ICD-10-CM

## 2021-08-23 DIAGNOSIS — E44.0 MODERATE MALNUTRITION (H): Primary | ICD-10-CM

## 2021-08-23 DIAGNOSIS — F43.21 ADJUSTMENT DISORDER WITH DEPRESSED MOOD: ICD-10-CM

## 2021-08-23 DIAGNOSIS — R64 CACHEXIA (H): ICD-10-CM

## 2021-08-23 DIAGNOSIS — F39 MOOD DISORDER (H): ICD-10-CM

## 2021-08-23 PROCEDURE — 99214 OFFICE O/P EST MOD 30 MIN: CPT | Performed by: FAMILY MEDICINE

## 2021-08-23 RX ORDER — QUETIAPINE FUMARATE 50 MG/1
TABLET, FILM COATED ORAL
Qty: 180 TABLET | Refills: 1 | Status: SHIPPED | OUTPATIENT
Start: 2021-08-23 | End: 2022-02-19

## 2021-08-23 RX ORDER — FLUTICASONE PROPIONATE 50 MCG
1-2 SPRAY, SUSPENSION (ML) NASAL DAILY
Qty: 16 G | Refills: 3 | Status: SHIPPED | OUTPATIENT
Start: 2021-08-23

## 2021-08-23 RX ORDER — MULTIPLE VITAMINS W/ MINERALS TAB 9MG-400MCG
1 TAB ORAL DAILY
Qty: 100 TABLET | Refills: 3 | Status: SHIPPED | OUTPATIENT
Start: 2021-08-23

## 2021-08-23 RX ORDER — POLYETHYLENE GLYCOL 3350 17 G/17G
1 POWDER, FOR SOLUTION ORAL DAILY
Qty: 507 G | Refills: 3 | Status: SHIPPED | OUTPATIENT
Start: 2021-08-23

## 2021-08-23 RX ORDER — MIRTAZAPINE 30 MG/1
30 TABLET, FILM COATED ORAL AT BEDTIME
Qty: 90 TABLET | Refills: 3 | Status: SHIPPED | OUTPATIENT
Start: 2021-08-23

## 2021-08-23 RX ORDER — NAPROXEN 500 MG/1
500 TABLET ORAL 2 TIMES DAILY WITH MEALS
Qty: 100 TABLET | Refills: 1 | Status: SHIPPED | OUTPATIENT
Start: 2021-08-23

## 2021-08-23 RX ORDER — LORATADINE 10 MG/1
10 TABLET ORAL DAILY
Qty: 90 TABLET | Refills: 3 | Status: SHIPPED | OUTPATIENT
Start: 2021-08-23

## 2021-08-23 RX ORDER — CHOLECALCIFEROL (VITAMIN D3) 50 MCG
50 TABLET ORAL DAILY
Qty: 90 TABLET | Refills: 3 | Status: SHIPPED | OUTPATIENT
Start: 2021-08-23

## 2021-08-23 RX ORDER — FAMOTIDINE 20 MG/1
20 TABLET, FILM COATED ORAL 2 TIMES DAILY PRN
Qty: 90 TABLET | Refills: 3 | Status: SHIPPED | OUTPATIENT
Start: 2021-08-23

## 2021-08-23 ASSESSMENT — MIFFLIN-ST. JEOR: SCORE: 980.1

## 2021-08-23 ASSESSMENT — PATIENT HEALTH QUESTIONNAIRE - PHQ9: SUM OF ALL RESPONSES TO PHQ QUESTIONS 1-9: 6

## 2021-08-23 NOTE — PROGRESS NOTES
[facility] Progress Note        Assessment & Plan     (E44.0) Moderate malnutrition (H)  (primary encounter diagnosis)  Comment: Patient continues to have no significant weight though she reports that she is eating well managing she has a strong menses. Patient's weight varies slightly up and down we will continue to monitor carefully  Plan: Follow-up in 1 month    (N94.6) Dysmenorrhea  Comment: Patient reports significant dysmenorrhea discussed considerable options including IUD Depo-Provera and OCPs patient initially was expressing interest in stopping her periods but then changed her mind and was completely against it so we will focus on managing her symptoms I advised her to start Naprosyn immediately when her period begins or even if she feels it will start the next day started every 12 hours.  Plan: naproxen (NAPROSYN) 500 MG tablet        Use as starting menses.    (F43.21) Adjustment disorder with depressed mood  Comment: Continues to have variable mood hoping to use quetiapine both for sedative and mood stabilizing properties as well as stimulation of diet  Plan: QUEtiapine (SEROQUEL) 50 MG tablet        Increase from 25 to 50 mg.    (E55.9) Vitamin D deficiency  Comment: Continue to replace  Plan: vitamin D3 (CHOLECALCIFEROL) 50 mcg (2000         units) tablet            (K59.01) Slow transit constipation  Comment: Continue to treat constipation  Plan: polyethylene glycol (MIRALAX) 17 GM/Dose powder            (F39) Mood disorder (H)  Comment: Continue current dose of mirtazapine.  Plan: mirtazapine (REMERON) 30 MG tablet            (J30.1) Chronic allergic rhinitis due to pollen  Comment:   Plan: loratadine (CLARITIN) 10 MG tablet, fluticasone        (FLONASE) 50 MCG/ACT nasal spray            (K21.9) Gastroesophageal reflux disease without esophagitis  Comment:   Plan: famotidine (PEPCID) 20 MG tablet, Calcium         Carbonate Antacid 1177 MG CHEW            (R64) Cachexia (H)  Comment: We  "will continue current management and follow-up in 1 month.  Plan: multivitamin w/minerals (MULTI-VITAMIN) tablet                I spent a total of 32 minutes on the day of the visit.   Time spent doing chart review, history and exam, documentation and further activities per the note           Return in about 4 weeks (around 9/20/2021) for In person visit.    Reuben Mixon MD  [facility] DIALLO Bucio is a 26 year old who presents for the following health issues  accompanied by her mother:  Patient presents with:  RECHECK: pt here for a check up on her weight and to get refils on her medications. no other questions or concerns.         HPI     Wt Readings from Last 4 Encounters:   08/23/21 34.1 kg (75 lb 3.2 oz)   07/30/21 34.3 kg (75 lb 9.6 oz)   05/18/21 34.8 kg (76 lb 12.8 oz)   04/26/21 34.3 kg (75 lb 9.6 oz)       PHQ 8/5/2020 7/30/2021 8/23/2021   PHQ-9 Total Score 14 0 6   Q9: Thoughts of better off dead/self-harm past 2 weeks Not at all Not at all Not at all     Low weight-weight is less than last visit by 1/4 lb, mother reports she is taking her medication. Her mother reports she is only taking small amounts of the Pediasure. Rupinder reports when she has her period she feels nauseous and in pain and this makes her not interested in eating. Period is every month LMP Friday 13th. Lasts usually  5 days and is heavy.  Patient reports she would like to stop her period. Discussed options of IUD, OCPs or Depo-Provera. Discussed with mother and patient. Decided against period suppression        Objective    /68   Pulse 88   Temp 98.1  F (36.7  C) (Oral)   Resp 16   Ht 1.48 m (4' 10.27\")   Wt 34.1 kg (75 lb 3.2 oz)   SpO2 98%   BMI 15.57 kg/m    Body mass index is 15.57 kg/m .  Physical Exam  Constitutional:       Appearance: She is cachectic. She is not ill-appearing.   HENT:      Head: Normocephalic.      Nose: Nose normal.      Mouth/Throat:      Mouth: Mucous membranes " are moist.   Eyes:      Conjunctiva/sclera: Conjunctivae normal.      Pupils: Pupils are equal, round, and reactive to light.   Cardiovascular:      Rate and Rhythm: Normal rate and regular rhythm.      Pulses: Normal pulses.   Pulmonary:      Effort: Pulmonary effort is normal.      Breath sounds: Normal breath sounds.   Abdominal:      General: Abdomen is flat.   Neurological:      Mental Status: She is alert.   Psychiatric:         Attention and Perception: Attention normal.         Mood and Affect: Mood is anxious.         Behavior: Behavior is cooperative.         Judgment: Judgment is impulsive.      Comments: Patient was more interactive and engaged in the visit actively discussed her condition and what she was planning to do. She scored 6 on the PHQ-9.

## 2021-08-23 NOTE — PATIENT INSTRUCTIONS
Here is the plan from today's visit    1. Dysmenorrhea  Start at the first sign of the period  - naproxen (NAPROSYN) 500 MG tablet; Take 1 tablet (500 mg) by mouth 2 times daily (with meals) Start at the first sign of menses and continue for 5 days.  Dispense: 100 tablet; Refill: 1    2. Moderate malnutrition (H)  Continue to try to eat.     3. Adjustment disorder with depressed mood  Continue   - QUEtiapine (SEROQUEL) 50 MG tablet; Take 1 tablet (50 mg) by mouth daily AND 1 tablet (50 mg) At Bedtime.  Dispense: 180 tablet; Refill: 1    4. Vitamin D deficiency   continue   - vitamin D3 (CHOLECALCIFEROL) 50 mcg (2000 units) tablet; Take 1 tablet (50 mcg) by mouth daily  Dispense: 90 tablet; Refill: 3    5. Slow transit constipation    - polyethylene glycol (MIRALAX) 17 GM/Dose powder; Take 17 g (1 capful) by mouth daily  Dispense: 507 g; Refill: 3    6. Mood disorder (H)  continue  - mirtazapine (REMERON) 30 MG tablet; Take 1 tablet (30 mg) by mouth At Bedtime  Dispense: 90 tablet; Refill: 3    7. Chronic allergic rhinitis due to pollen    - loratadine (CLARITIN) 10 MG tablet; Take 1 tablet (10 mg) by mouth daily  Dispense: 90 tablet; Refill: 3  - fluticasone (FLONASE) 50 MCG/ACT nasal spray; Spray 1-2 sprays into both nostrils daily  Dispense: 16 g; Refill: 3    8. Gastroesophageal reflux disease without esophagitis    - famotidine (PEPCID) 20 MG tablet; Take 1 tablet (20 mg) by mouth 2 times daily as needed (heartburn, nausea)  Dispense: 90 tablet; Refill: 3  - Calcium Carbonate Antacid 1177 MG CHEW; Take 1 tablet (1,177 mg) by mouth 2 times daily as needed (Reflux)  Dispense: 84 tablet; Refill: 3    9. Cachexia (H)    - multivitamin w/minerals (MULTI-VITAMIN) tablet; Take 1 tablet by mouth daily  Dispense: 100 tablet; Refill: 3    Continue daily walks,   Please call or return to clinic if your symptoms don't go away.    Follow up plan  Return in about 4 weeks (around 9/20/2021) for In person visit.     Thank you  for coming to [facility] today.  Lab Testing:  **If you had lab testing today and your results are reassuring or normal they will be mailed to you or sent through [employer] within 7 days.   **If the lab tests need quick action we will call you with the results.  The phone number we will call with results is # [phone] (home) . If this is not the best number please call our clinic and change the number.  Medication Refills:  If you need any refills please call your pharmacy and they will contact us.   If you need to  your refill at a new pharmacy, please contact the new pharmacy directly. The new pharmacy will help you get your medications transferred faster.   Scheduling:  If you have any concerns about today's visit or wish to schedule another appointment please call our office during normal business hours [phone] (8-5:00 M-F)   eferrals to [facility] please call [phone].   Mammogram Scheduling [phone]     XRay/CT/Ultrasound/MRI Scheduling [phone]    Medical Concerns:  If you have urgent medical concerns please call [phone] at any time of the day.    Reuben Mixon MD

## 2021-09-24 ENCOUNTER — OFFICE VISIT (OUTPATIENT)
Dept: FAMILY MEDICINE | Facility: CLINIC | Age: 26
End: 2021-09-24
Payer: MEDICAID

## 2021-09-24 VITALS
RESPIRATION RATE: 16 BRPM | SYSTOLIC BLOOD PRESSURE: 100 MMHG | HEART RATE: 71 BPM | TEMPERATURE: 98.1 F | WEIGHT: 75.4 LBS | DIASTOLIC BLOOD PRESSURE: 69 MMHG | BODY MASS INDEX: 15.83 KG/M2 | OXYGEN SATURATION: 95 % | HEIGHT: 58 IN

## 2021-09-24 DIAGNOSIS — R30.0 DYSURIA: Primary | ICD-10-CM

## 2021-09-24 DIAGNOSIS — N92.6 IRREGULAR PERIODS: ICD-10-CM

## 2021-09-24 DIAGNOSIS — E63.9 UNDERNUTRITION: ICD-10-CM

## 2021-09-24 DIAGNOSIS — Z23 NEED FOR PROPHYLACTIC VACCINATION AND INOCULATION AGAINST INFLUENZA: ICD-10-CM

## 2021-09-24 LAB
ALBUMIN UR-MCNC: ABNORMAL MG/DL
APPEARANCE UR: ABNORMAL
BACTERIA #/AREA URNS HPF: ABNORMAL /HPF
BILIRUB UR QL STRIP: NEGATIVE
COLOR UR AUTO: YELLOW
GLUCOSE UR STRIP-MCNC: NEGATIVE MG/DL
HCG UR QL: NEGATIVE
HGB UR QL STRIP: NEGATIVE
KETONES UR STRIP-MCNC: ABNORMAL MG/DL
LEUKOCYTE ESTERASE UR QL STRIP: ABNORMAL
NITRATE UR QL: NEGATIVE
PH UR STRIP: 5.5 [PH] (ref 5–8)
RBC #/AREA URNS AUTO: ABNORMAL /HPF
SP GR UR STRIP: >=1.03 (ref 1–1.03)
UROBILINOGEN UR STRIP-ACNC: 0.2 E.U./DL
WBC #/AREA URNS AUTO: ABNORMAL /HPF

## 2021-09-24 PROCEDURE — 90471 IMMUNIZATION ADMIN: CPT | Performed by: FAMILY MEDICINE

## 2021-09-24 PROCEDURE — 81001 URINALYSIS AUTO W/SCOPE: CPT | Performed by: FAMILY MEDICINE

## 2021-09-24 PROCEDURE — 81025 URINE PREGNANCY TEST: CPT | Performed by: FAMILY MEDICINE

## 2021-09-24 PROCEDURE — 99213 OFFICE O/P EST LOW 20 MIN: CPT | Mod: 25 | Performed by: FAMILY MEDICINE

## 2021-09-24 PROCEDURE — 90686 IIV4 VACC NO PRSV 0.5 ML IM: CPT | Performed by: FAMILY MEDICINE

## 2021-09-24 ASSESSMENT — MIFFLIN-ST. JEOR: SCORE: 976.76

## 2021-09-24 NOTE — NURSING NOTE
Due to patient being non-English speaking/uses sign language, an  was used for this visit. Only for face-to-face interpretation by an external agency, date and length of interpretation can be found on the scanned worksheet.     name: Bijutapan Presley  Language: Nepalese  Agency: [employer]  Phone number: [phone]  Type of interpretation:Telephone, spoken

## 2021-09-24 NOTE — PROGRESS NOTES
Assessment & Plan     Dysuria  Discussed that her one week history of burning during urination without associated hematuria, urinary frequency, vaginal discharge, or lower abdominal/CVA tenderness could be due to a UTI. We will wait for test results, and prescribe anti-biotics as needed.     - UA reflex to Microscopic and Culture      Undernutrition  Discussed the importance of getting her weight up. She was resistant to further discussion about her appetite. We briefly discussed the idea of eating more frequent and smaller meals throughout the day to work with her loss of appetite while still getting her weight up. We will follow up on this at her next appointment.     Irregular periods  Given her irregular periods and dysuria, recommended checking UPT at this time. Offered further discussion about treatments to stop periods or painful periods - patient and mother were not interested at this time.     - HCG qualitative urine    Need for prophylactic vaccination and inoculation against influenza  - INFLUENZA VACCINE IM > 6 MONTHS VALENT IIV4 (AFLURIA/FLUZONE)      Return in about 2 months (around 11/24/2021).    Nam Thompson, MS3   I was present with the medical student who participated in the service and in the documentation of this note. I have verified the history and personally performed the physical exam and medical decision making, and have verified the content of the note, which accurately reflects my assessment of the patient and the plan of care.   MD Reuben Quick MD  [facility] DIALLO Bucio is a 26 year old who presents for the following health issues with her mother.     HPI   1. Weight check  Her mother reports that she is working very hard to try to make the daughter eat, but she is resistant to eating more than one meal a day. She has a low appetite, and doesn't eat when she's not hungry. The daughter says that she has been eating three meals a  "day. She has been taking her medication as prescribed, but doesn't seem to be experiencing an increase in appetite. Her weight today is unchanged.     2. Dysuria  She has been experiencing burning during urination for the last week. She has not noticed any hematuria, any discoloration of her urine, or any urinary frequency. She also denies any associated abdominal or back pain. She has not tried treating this with any medications. She cannot remember anything that changed around when this started.     3. Menstrual Cramps  She reports increased lower abdominal pain during her period. She has been taking ibuprofen and naproxen for this and has noticed improvement. She rates this pain at a 5/10.     Review of Systems   Constitutional, HEENT, cardiovascular, pulmonary, gi and gu systems are negative, except as otherwise noted.      Objective    /69   Pulse 71   Temp 98.1  F (36.7  C) (Oral)   Resp 16   Ht 1.473 m (4' 10\")   Wt 34.2 kg (75 lb 6.4 oz)   SpO2 95%   BMI 15.76 kg/m    Body mass index is 15.76 kg/m .  Physical Exam   GENERAL: healthy, alert and no distress  RESP: breathing comfortably  MS: no gross musculoskeletal defects noted, no edema  PSYCH: mentation appears normal, anxious and irritated today.     Results for orders placed or performed in visit on 09/24/21 (from the past 24 hour(s))   UA reflex to Microscopic and Culture    Specimen: Urine, Midstream   Result Value Ref Range    Color Urine Yellow Colorless, Straw, Light Yellow, Yellow    Appearance Urine Slightly Cloudy (A) Clear    Glucose Urine Negative Negative mg/dL    Bilirubin Urine Negative Negative    Ketones Urine Trace (A) Negative mg/dL    Specific Gravity Urine >=1.030 1.005 - 1.030    Blood Urine Negative Negative    pH Urine 5.5 5.0 - 8.0    Protein Albumin Urine Trace (A) Negative mg/dL    Urobilinogen Urine 0.2 0.2, 1.0 E.U./dL    Nitrite Urine Negative Negative    Leukocyte Esterase Urine Small (A) Negative   HCG " qualitative urine   Result Value Ref Range    hCG Urine Qualitative Negative Negative       ----- Services Performed by a MEDICAL STUDENT in Presence of ATTENDING Physician-------

## 2021-09-24 NOTE — PATIENT INSTRUCTIONS
Here is the plan from today's visit    1. Dysuria  Check sample  - UA reflex to Microscopic and Culture; Future    2. Undernutrition  Continue current medications, eat at least 4 meals a day.       Please call or return to clinic if your symptoms don't go away.    Follow up plan  Return in about 2 months (around 11/24/2021).     Thank you for coming to [facility] today.  Lab Testing:  **If you had lab testing today and your results are reassuring or normal they will be mailed to you or sent through [employer] within 7 days.   **If the lab tests need quick action we will call you with the results.  The phone number we will call with results is # [phone] (home) . If this is not the best number please call our clinic and change the number.  Medication Refills:  If you need any refills please call your pharmacy and they will contact us.   If you need to  your refill at a new pharmacy, please contact the new pharmacy directly. The new pharmacy will help you get your medications transferred faster.   Scheduling:  If you have any concerns about today's visit or wish to schedule another appointment please call our office during normal business hours [phone] (8-5:00 M-F)   eferrals to [facility] please call [phone].   Mammogram Scheduling [phone]     XRay/CT/Ultrasound/MRI Scheduling [phone]    Medical Concerns:  If you have urgent medical concerns please call [phone] at any time of the day.    Reuben Mixon MD

## 2022-02-17 PROBLEM — K21.9 GASTROESOPHAGEAL REFLUX DISEASE: Status: ACTIVE | Noted: 2017-10-10
